# Patient Record
Sex: FEMALE | Race: WHITE | NOT HISPANIC OR LATINO | Employment: OTHER | URBAN - METROPOLITAN AREA
[De-identification: names, ages, dates, MRNs, and addresses within clinical notes are randomized per-mention and may not be internally consistent; named-entity substitution may affect disease eponyms.]

---

## 2017-01-23 ENCOUNTER — TRANSFERRED RECORDS (OUTPATIENT)
Dept: CARDIOLOGY | Facility: CLINIC | Age: 82
End: 2017-01-23

## 2017-01-31 ENCOUNTER — TRANSFERRED RECORDS (OUTPATIENT)
Dept: CARDIOLOGY | Facility: CLINIC | Age: 82
End: 2017-01-31

## 2017-02-08 DIAGNOSIS — I10 ESSENTIAL HYPERTENSION WITH GOAL BLOOD PRESSURE LESS THAN 140/90: Primary | Chronic | ICD-10-CM

## 2017-02-08 NOTE — TELEPHONE ENCOUNTER
Reason for Call:  Medication or medication refill:    Do you use a [facility]?  Name of the pharmacy and phone number for the current request:  leo in [facility]    Name of the medication requested: substitute for amLODIPine-benazepril (LOTREL) 10-40 MG per capsule states pharmacy told her ins will not cover-states is high importance    Other request:     Can we leave a detailed message on this number? YES    Phone number patient can be reached at: Other phone number:  [phone]    Best Time: asap    Call taken on 2/8/2017 at 3:18 PM by SOFIA MARCUM

## 2017-02-10 RX ORDER — AMLODIPINE AND BENAZEPRIL HYDROCHLORIDE 10; 40 MG/1; MG/1
CAPSULE ORAL
Qty: 90 CAPSULE | Refills: 2 | Status: SHIPPED | OUTPATIENT
Start: 2017-02-10 | End: 2017-05-16

## 2017-02-10 NOTE — TELEPHONE ENCOUNTER
Amlodipine-benazepril (lotrel) 10-40 mg  Last Written Prescription Date: 11/2/16  Last Fill Quantity: 90, # refills: 3  [facility] with [facility] or [facility] prescribing provider: 9/28/16       POTASSIUM   Date Value Ref Range Status   09/28/2016 4.0 3.4 - 5.3 mmol/L Final     CREATININE   Date Value Ref Range Status   09/28/2016 0.61 0.52 - 1.04 mg/dL Final     BP Readings from Last 3 Encounters:   09/28/16 112/64   10/20/15 110/56   09/30/15 130/58   Prescription approved per [facility] Refill Protocol.

## 2017-02-14 ENCOUNTER — TELEPHONE (OUTPATIENT)
Dept: FAMILY MEDICINE | Facility: CLINIC | Age: 82
End: 2017-02-14

## 2017-02-14 NOTE — TELEPHONE ENCOUNTER
If they want these two ingredients ordered separately, that will be fine (amlodipine and benazepril can be ordered as separate medications)      Please get more information regarding this issue.

## 2017-02-14 NOTE — TELEPHONE ENCOUNTER
pateint called stating [facility] is not going to cover amLODIPine-benazepril (LOTREL) 10-40 MG per capsule the without a PA.    [phone]  PT ID# [id number]    YANELI Vale

## 2017-02-14 NOTE — TELEPHONE ENCOUNTER
Prior Authorization Request    1. Prior Authorization for the medication amLODIPine-benazepril (LOTREL) 10-40 MG         Requesting Provider: Rodger Razo          Pt name: Lola Coto        Pt : 1934        Pt MRN: [id number]        Last Office Visit: 2016           Insurance: Payor: MEDICARE / Plan: MEDICARE / Product Type: Medicare /         Insurance ID Number: [unfilled] ID# [id number]        Prior Auth Contact Phone number: [phone]      To be completed by provider:           If requesting prior auth initiation:

## 2017-04-10 ENCOUNTER — TELEPHONE (OUTPATIENT)
Dept: FAMILY MEDICINE | Facility: CLINIC | Age: 82
End: 2017-04-10

## 2017-04-10 NOTE — TELEPHONE ENCOUNTER
Prior Authorization Request    Prior Authorization for the medication Estradiol 0.025MG/24HR weekly patches  1.         Requesting Provider: Rodger Razo          Pt name: Lola Coto        Pt : 1934        Pt MRN: [id number]        Last Office Visit: 2017           Insurance: Payor: MEDICARE / Plan: MEDICARE / Product Type: Medicare /         Insurance ID Number: [unfilled]        Prior Auth Contact Phone number:     BIN#:   PCN#:     PA started on CMM.       To be completed by provider:     2.   Refuse or Start Prior Auth:  Do not start Prior Auth, please call pt and have an office visit scheduled to discuss.      If requesting prior auth initiation:     Diagnosis

## 2017-05-01 ENCOUNTER — TRANSFERRED RECORDS (OUTPATIENT)
Dept: HEALTH INFORMATION MANAGEMENT | Facility: CLINIC | Age: 82
End: 2017-05-01

## 2017-05-12 ENCOUNTER — OFFICE VISIT (OUTPATIENT)
Dept: FAMILY MEDICINE | Facility: CLINIC | Age: 82
End: 2017-05-12
Payer: MEDICARE

## 2017-05-12 VITALS
TEMPERATURE: 97.4 F | WEIGHT: 118 LBS | HEIGHT: 64 IN | HEART RATE: 57 BPM | RESPIRATION RATE: 14 BRPM | SYSTOLIC BLOOD PRESSURE: 110 MMHG | DIASTOLIC BLOOD PRESSURE: 80 MMHG | BODY MASS INDEX: 20.14 KG/M2 | OXYGEN SATURATION: 97 %

## 2017-05-12 DIAGNOSIS — E78.2 MIXED HYPERLIPIDEMIA: ICD-10-CM

## 2017-05-12 DIAGNOSIS — Z13.89 SCREENING FOR DIABETIC PERIPHERAL NEUROPATHY: ICD-10-CM

## 2017-05-12 DIAGNOSIS — E11.9 TYPE 2 DIABETES MELLITUS WITHOUT COMPLICATION, WITHOUT LONG-TERM CURRENT USE OF INSULIN (H): ICD-10-CM

## 2017-05-12 DIAGNOSIS — I48.0 PAROXYSMAL ATRIAL FIBRILLATION (H): ICD-10-CM

## 2017-05-12 DIAGNOSIS — N30.00 ACUTE CYSTITIS WITHOUT HEMATURIA: Primary | ICD-10-CM

## 2017-05-12 DIAGNOSIS — I10 BENIGN ESSENTIAL HYPERTENSION: ICD-10-CM

## 2017-05-12 DIAGNOSIS — L23.9 ALLERGIC DERMATITIS: ICD-10-CM

## 2017-05-12 DIAGNOSIS — R82.90 NONSPECIFIC FINDING ON EXAMINATION OF URINE: ICD-10-CM

## 2017-05-12 LAB
ALBUMIN UR-MCNC: NEGATIVE MG/DL
ALT SERPL W P-5'-P-CCNC: 25 U/L (ref 0–50)
APPEARANCE UR: ABNORMAL
BACTERIA #/AREA URNS HPF: ABNORMAL /HPF
BILIRUB UR QL STRIP: NEGATIVE
CHOLEST SERPL-MCNC: 162 MG/DL
COLOR UR AUTO: YELLOW
GLUCOSE UR STRIP-MCNC: NEGATIVE MG/DL
HBA1C MFR BLD: 6 % (ref 4.3–6)
HDLC SERPL-MCNC: 87 MG/DL
HGB UR QL STRIP: NEGATIVE
KETONES UR STRIP-MCNC: NEGATIVE MG/DL
LDLC SERPL CALC-MCNC: 58 MG/DL
LEUKOCYTE ESTERASE UR QL STRIP: ABNORMAL
NITRATE UR QL: POSITIVE
NONHDLC SERPL-MCNC: 75 MG/DL
PH UR STRIP: 5.5 PH (ref 5–7)
RBC #/AREA URNS AUTO: ABNORMAL /HPF (ref 0–2)
SP GR UR STRIP: 1.02 (ref 1–1.03)
TRIGL SERPL-MCNC: 85 MG/DL
URN SPEC COLLECT METH UR: ABNORMAL
UROBILINOGEN UR STRIP-ACNC: 0.2 EU/DL (ref 0.2–1)
WBC #/AREA URNS AUTO: ABNORMAL /HPF (ref 0–2)

## 2017-05-12 PROCEDURE — 80061 LIPID PANEL: CPT | Performed by: FAMILY MEDICINE

## 2017-05-12 PROCEDURE — 87088 URINE BACTERIA CULTURE: CPT | Performed by: FAMILY MEDICINE

## 2017-05-12 PROCEDURE — 87086 URINE CULTURE/COLONY COUNT: CPT | Performed by: FAMILY MEDICINE

## 2017-05-12 PROCEDURE — 36415 COLL VENOUS BLD VENIPUNCTURE: CPT | Performed by: FAMILY MEDICINE

## 2017-05-12 PROCEDURE — 81001 URINALYSIS AUTO W/SCOPE: CPT | Performed by: FAMILY MEDICINE

## 2017-05-12 PROCEDURE — 99214 OFFICE O/P EST MOD 30 MIN: CPT | Performed by: FAMILY MEDICINE

## 2017-05-12 PROCEDURE — 99207 C FOOT EXAM  NO CHARGE: CPT | Performed by: FAMILY MEDICINE

## 2017-05-12 PROCEDURE — 87186 SC STD MICRODIL/AGAR DIL: CPT | Performed by: FAMILY MEDICINE

## 2017-05-12 PROCEDURE — 84460 ALANINE AMINO (ALT) (SGPT): CPT | Performed by: FAMILY MEDICINE

## 2017-05-12 PROCEDURE — 83036 HEMOGLOBIN GLYCOSYLATED A1C: CPT | Performed by: FAMILY MEDICINE

## 2017-05-12 NOTE — NURSING NOTE
"Chief Complaint   Patient presents with     UTI     /80  Pulse 57  Temp 97.4  F (36.3  C) (Tympanic)  Resp 14  Ht 5' 4\" (1.626 m)  Wt 118 lb (53.5 kg)  LMP  (LMP Unknown)  SpO2 97%  Breastfeeding? No  BMI 20.25 kg/m2 Estimated body mass index is 20.25 kg/(m^2) as calculated from the following:    Height as of this encounter: 5' 4\" (1.626 m).    Weight as of this encounter: 118 lb (53.5 kg).  BP completed using cuff size: regular   Marissa Carson CMA    Health Maintenance Due   Topic Date Due     FOOT EXAM Q1 YEAR( NO INBASKET)  1935     ADVANCE DIRECTIVE PLANNING Q5 YRS (NO INBASKET)  09/29/1952     OP ANNUAL INR REFERRAL  04/19/2015     FALL RISK ASSESSMENT  10/07/2015     EYE EXAM Q1 YEAR( NO INBASKET)  10/19/2016     A1C Q6 MO( NO INBASKET)  03/28/2017     Health Maintenance reviewed at today's visit patient asked to schedule/complete:   Diabetes:  Patient agrees to schedule    "

## 2017-05-12 NOTE — LETTER
"    5/19/2017     Lola Coto  [address] [place] [address]    Dear Lola:    Here are the results of your latest lipid tests:    LDL Cholesterol Calculated   Date Value Ref Range Status   05/12/2017 58 <100 mg/dL Final     Comment:     Desirable:       <100 mg/dl   ]  HDL Cholesterol   Date Value Ref Range Status   05/12/2017 87 >49 mg/dL Final   ]  Triglycerides   Date Value Ref Range Status   05/12/2017 85 <150 mg/dL Final     Comment:     Non Fasting   ]  Cholesterol   Date Value Ref Range Status   05/12/2017 162 <200 mg/dL Final   ]    LDL is the \"bad\" cholesterol linked to heart disease and stroke.   HDL is the \"good\" choesterol and when it is high, it decreases the risk for above problems.    Follow a low-fat, low-cholesterol diet and get regular exercise.  Please feel free to call the clinic if you have any questions.    You should be feeling better from your bladder infection as keflex was the appropriate antibiotic   Sincerely,      Key Lowry   "

## 2017-05-12 NOTE — MR AVS SNAPSHOT
After Visit Summary   5/12/2017    Lola Coto    MRN: [id number]           Patient Information     Date Of Birth          9/29/1934        Visit Information        Provider Department      5/12/2017 10:00 AM Key Lowry MD [facility]        Today's Diagnoses     Acute cystitis without hematuria    -  1    Benign essential hypertension        Type 2 diabetes mellitus without complication, without long-term current use of insulin (H)        Mixed hyperlipidemia        Paroxysmal atrial fibrillation (H)        Screening for diabetic peripheral neuropathy          Care Instructions    1. You have  a bladder infection Please increase your water intake  Do not drink cranberry juice as this does nothing different from water except cause weight gain.   We  Will notify you re the results of today's urine culture  You may need to return to be sure the infection is gone.  Oftentimes sexual intercourse causes the bacteria from the rectum to go into the bladder and cause infection.  To avoid this, please get up and go to the bathroom after intercourse and drink a large glass of water.  This allows the urine to wash out the infective bacteria and thus speed healing of an infection, and prevent one from occurring.  You may buy pyridium over the counter to help with the burning  It is a purple pill that turns the urine orange and helps with the burning     2. Get pyridium otc for the burning  As needed    3. We will do a urine today and if no sign of infection ( as no sign of infection on 5-3-17 and 5-8-17 )  Then would redo the urine --> culture as required 5-7 days after the last nitro furantoin-- take no more after  today     4. No more creams of any sort on the bottom     5. Warm water soaks for 10  min  2 times a day , then press dry ( the red area )     6. You see Dr Razo in 5 days  He can perhaps do the urine then         Follow-ups after your visit       "  Follow-up notes from your care team     Return in about 6 months (around 11/12/2017) for Routine Visit.      Your next 10 appointments already scheduled     May 16, 2017 10:15 AM CDT   [facility] Visit [facility]azo [facility]    [address] [place] [address]   [phone]           Bring a current list of meds and any records pertaining to this visit.  For Physicals, please bring immunization records and any forms needing to be filled out.  Please arrive 10 minutes early to complete paperwork.              Who to contact     If you have questions or need follow up information about today's clinic visit or your schedule please contact [facility] directly at [phone].  Normal or non-critical lab and imaging results will be communicated to you by MyChart, letter or phone within 4 business days after the clinic has received the results. If you do not hear from us within 7 days, please contact the clinic through MyChart or phone. If you have a critical or abnormal lab result, we will notify you by phone as soon as possible.  Submit refill requests through [employer] or call your pharmacy and they will forward the refill request to us. Please allow 3 business days for your refill to be completed.          Additional Information About Your Visit        MyChart Information     [employer] lets you send messages to your doctor, view your test results, renew your prescriptions, schedule appointments and more. To sign up, go to [email or web address] . Click on \"Log in\" on the left side of the screen, which will take you to the Welcome page. Then click on \"Sign up Now\" on the right side of the page.     You will be asked to enter the access code listed below, as well as some personal information. Please follow the directions to create your username and password.     Your access code " "is: [email or web address]  Expires: 8/10/2017 10:45 AM     Your access code will  in 90 days. If you need help or a new code, please call your [facility] clinic or [phone].        Care EveryWhere ID     This is your Care EveryWhere ID. This could be used by other organizations to access your [facility] medical records  [id number]        Your Vitals Were     Pulse Temperature Respirations Height Last Period Pulse Oximetry    57 97.4  F (36.3  C) (Tympanic) 14 5' 4\" (1.626 m) (LMP Unknown) 97%    Breastfeeding? BMI (Body Mass Index)                No 20.25 kg/m2           Blood Pressure from Last 3 Encounters:   17 110/80   16 112/64   10/20/15 110/56    Weight from Last 3 Encounters:   17 118 lb (53.5 kg)   16 125 lb (56.7 kg)   10/20/15 125 lb (56.7 kg)              We Performed the Following     ALT     C FOOT EXAM  NO CHARGE     Hemoglobin A1c     Lipid panel reflex to direct LDL        Primary Care Provider Office Phone # Fax #    Rodger Razo [phone]       [facility] [address] [place] [facility] [address]        Thank you!     Thank you for choosing [facility]  for your care. Our goal is always to provide you with excellent care. Hearing back from our patients is one way we can continue to improve our services. Please take a few minutes to complete the written survey that you may receive in the mail after your visit with us. Thank you!             Your Updated Medication List - Protect others around you: Learn how to safely use, store and throw away your medicines at www.disposemymeds.org.          This list is accurate as of: 17 10:46 AM.  Always use your most recent med list.                   Brand Name Dispense Instructions for use    amLODIPine-benazepril 10-40 MG per capsule    LOTREL    90 capsule    TAKE 1 CAPSULE BY MOUTH EVERY DAY       atorvastatin 10 MG tablet    LIPITOR    90 tablet    TAKE 1 TABLET " BY MOUTH ONCE DAILY       Calcium-Vitamin D 600-200 MG-UNIT Tabs      Take  by mouth.       CENTRUM SILVER PO      Take  by mouth.       ciprofloxacin 500 MG tablet    CIPRO    14 tablet    Take 1 tablet (500 mg) by mouth 2 times daily       estradiol 0.025 MG/24HR Ptwk WK patch    FEMPATCH    12 patch    UNWRAP AND APPLY 1 PATCH TRANSDERMALLY EVERY WEEK       fenofibrate 145 MG tablet     90 tablet    TAKE 1 TABLET BY MOUTH EVERY DAY       flecainide 100 MG tablet    TAMBOCOR    180 tablet    Take 1 tablet (100 mg) by mouth 2 times daily       levothyroxine 50 MCG tablet    SYNTHROID/LEVOTHROID    90 tablet    TAKE 1 TABLET BY MOUTH ONCE DAILY       metFORMIN 500 MG tablet    GLUCOPHAGE    270 tablet    Take 1 tablet (500 mg) by mouth 3 times daily (with meals) Patient takes one in the morning and two in the evening       metroNIDAZOLE 250 MG tablet    FLAGYL    21 tablet    Take 1 tablet (250 mg) by mouth 3 times daily       nitrofurantoin (macrocrystal-monohydrate) 100 MG capsule    MACROBID    14 capsule    Take 1 capsule (100 mg) by mouth 2 times daily       warfarin 2 MG tablet    COUMADIN    180 tablet    Take 2 tablets (4 mg) by mouth daily

## 2017-05-12 NOTE — PROGRESS NOTES
SUBJECTIVE:                                                    Lola Coto is a 82 year old female who presents to clinic today for the following health issues:    URINARY TRACT SYMPTOMS      Duration: x 1 week    Description  dysuria, frequency, retention, incontinence   Initial Rx 5-3-17 in [place] with nitrofurantoin 100mgm bid without any help at all   5-8-17 ur[facility] in [place]  Rx pyridium and cipro--she did not take the abx   Did buy monistat cream and applied to perineum with q void so many times a day and developed red burning scaling area of entire perineum, extending to inner thighs and pubic area with some mild swelling     Intensity:  severe    Accompanying signs and symptoms:  Fever/chills: no   Flank pain YES  Nausea and vomiting: YES  Vaginal symptoms: none  Abdominal/Pelvic Pain: YES    History  History of frequent UTI's: YES  History of kidney stones: no   Sexually Active: no   Possibility of pregnancy: No    Precipitating or alleviating factors: None    Therapies tried and outcome: course of antibiotics - Nitrofuratoin   5-3 to 5-11-17  Outcome: NA     Medication Followup of ALLERGY TO MONISTAT CREAM       Taking Medication as prescribed: no applied 8 x ++ a day to perineum starting 5-8-17     Side Effects:  Developed a burning red rash of the entire area     Medication Helping Symptoms:  NO     Diabetes Follow-up      Patient is checking blood sugars: not at all    Diabetic concerns: skin yeast or reaction and UTI      Symptoms of hypoglycemia (low blood sugar): none     Paresthesias (numbness or burning in feet) or sores: No     Date of last diabetic eye exam: 5-17    Med: metformin 500mgm      Mixed Hyperlipidemia Follow-Up      Rate your low fat/cholesterol diet?: not monitoring fat    Taking statin?  Yes, no muscle aches from 10mgm atorvastatin    Other lipid medications/supplements?:  Fenofibrate, without side effects    9-16 lipid panel = wnl      Hypertension Follow-up      Outpatient  "blood pressures are not being checked.     Here < 140/80    Low Salt Diet: not monitoring salt    ANTICOAGULATED with COUMADIN  For  PAT    -NO PROBLEMS  With the coumadin   -follow[place] IN[place] and in [place] and in [place]        Problem list and histories reviewed & adjusted, as indicated.  Additional history: as documented    Labs reviewed in EPIC    Reviewed and updated as needed this visit by clinical staff  Tobacco  Allergies  Meds  Problems  Med Hx  Surg Hx  Fam Hx  Soc Hx        Reviewed and updated as needed this visit by Provider         ROS:  C: NEGATIVE for fever, chills, change in weight  I: NEGATIVE for worrisome rashes, moles or lesions  E: NEGATIVE for vision changes or irritation  E/M: NEGATIVE for ear, mouth and throat problems  R: NEGATIVE for significant cough or SOB  B: NEGATIVE for masses, tenderness or discharge  CV: NEGATIVE for chest pain, palpitations or peripheral edema  GI: NEGATIVE for nausea, abdominal pain, heartburn, or change in bowel habits   female: dysuria, frequency, retention and urgency; entire perineum and between thighs = red and scaley   M: NEGATIVE for significant arthralgias or myalgia  N: NEGATIVE for weakness, dizziness or paresthesias  E: NEGATIVE for temperature intolerance, skin/hair changes  H: NEGATIVE for bleeding problems  P: NEGATIVE for changes in mood or affect    OBJECTIVE:                                                    /80  Pulse 57  Temp 97.4  F (36.3  C) (Tympanic)  Resp 14  Ht 5' 4\" (1.626 m)  Wt 118 lb (53.5 kg)  LMP  (LMP Unknown)  SpO2 97%  Breastfeeding? No  BMI 20.25 kg/m2  Body mass index is 20.25 kg/(m^2).  GENERAL: healthy, alert and no distress  EYES: Eyes grossly normal to inspection, PERRL and conjunctivae and sclerae normal  RESP: lungs clear to auscultation - no rales, rhonchi or wheezes  CV: regular rate and rhythm, normal S1 S2, no S3 or S4, no murmur, click or rub, no peripheral edema and peripheral pulses strong  MS: no " gross musculoskeletal defects noted, no edema  SKIN: no suspicious lesions ; red to pink confluent , scaley entire perineum to pubis, and between thighs   .Diabetic Foot Exam: No sores, ulcers, erthematous pressure areas; good DP& PT pulses and normal capillary filling time ;normal sensation to monofilament  testing   NEURO: Normal strength and tone, mentation intact and speech normal  PSYCH: mentation appears normal, affect normal/bright    Diagnostic Test Results:  Results for orders placed or performed in visit on 05/12/17 (from the past 24 hour(s))   Hemoglobin A1c   Result Value Ref Range    Hemoglobin A1C 6.0 4.3 - 6.0 %   UA reflex to Microscopic and Culture   Result Value Ref Range    Color Urine Yellow     Appearance Urine Cloudy     Glucose Urine Negative NEG mg/dL    Bilirubin Urine Negative NEG    Ketones Urine Negative NEG mg/dL    Specific Gravity Urine 1.025 1.003 - 1.035    Blood Urine Negative NEG    pH Urine 5.5 5.0 - 7.0 pH    Protein Albumin Urine Negative NEG mg/dL    Urobilinogen Urine 0.2 0.2 - 1.0 EU/dL    Nitrite Urine Positive (A) NEG    Leukocyte Esterase Urine Trace (A) NEG    Source Midstream Urine    Urine Microscopic   Result Value Ref Range    WBC Urine 2-5 (A) 0 - 2 /HPF    RBC Urine O - 2 0 - 2 /HPF    Bacteria Urine Many (A) NEG /HPF        ASSESSMENT/PLAN:                                                              ICD-10-CM    1. Acute cystitis without hematuria N30.00 UA reflex to Microscopic and Culture     Urine Microscopic   2. 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 Type 2 diabetes mellitus without complication, without long-term current use of insulin (H) E11.9 Hemoglobin A1c     C FOOT EXAM  NO CHARGE   4. Benign essential hypertension I10    5. Mixed hyperlipidemia E78.2 ALT     Lipid panel reflex to direct LDL   6. Paroxysmal atrial fibrillation (H) I48.0    7. Screening for diabetic peripheral neuropathy Z13.89 C FOOT EXAM  NO CHARGE   8.  Nonspecific finding on examination of urine R82.90 Urine Culture Aerobic Bacterial       Patient Instructions   1. You have  a bladder infection Please increase your water intake  Do not drink cranberry juice as this does nothing different from water except cause weight gain.   We  Will notify you re the results of today's urine culture  You may need to return to be sure the infection is gone.  Oftentimes sexual intercourse causes the bacteria from the rectum to go into the bladder and cause infection.  To avoid this, please get up and go to the bathroom after intercourse and drink a large glass of water.  This allows the urine to wash out the infective bacteria and thus speed healing of an infection, and prevent one from occurring.  You may buy pyridium over the counter to help with the burning  It is a purple pill that turns the urine orange and helps with the burning     2. Get pyridium otc for the burning  As needed    3. We will do a urine today and if no sign of infection ( as no sign of infection on 5-3-17 and 5-8-17 )  Then would redo the urine --> culture as required 5-7 days after the last nitro furantoin-- take no more after  today     4. No more creams of any sort on the bottom     5. Warm water soaks for 10  min  2 times a day , then press dry ( the red area )     6. You see Dr Razo in 5 days  He can perhaps do the urine then     Hopefully the contact dermatitis will die down   Off the abx, we can see if pt really has aUTI and can Rx if does     Key Lowry MD  [facility]

## 2017-05-12 NOTE — PATIENT INSTRUCTIONS
1. You have  a bladder infection Please increase your water intake  Do not drink cranberry juice as this does nothing different from water except cause weight gain.   We  Will notify you re the results of today's urine culture  You may need to return to be sure the infection is gone.  Oftentimes sexual intercourse causes the bacteria from the rectum to go into the bladder and cause infection.  To avoid this, please get up and go to the bathroom after intercourse and drink a large glass of water.  This allows the urine to wash out the infective bacteria and thus speed healing of an infection, and prevent one from occurring.  You may buy pyridium over the counter to help with the burning  It is a purple pill that turns the urine orange and helps with the burning     2. Get pyridium otc for the burning  As needed    3. We will do a urine today and if no sign of infection ( as no sign of infection on 5-3-17 and 5-8-17 )  Then would redo the urine --> culture as required 5-7 days after the last nitro furantoin-- take no more after  today     4. No more creams of any sort on the bottom     5. Warm water soaks for 10  min  2 times a day , then press dry ( the red area )     6. You see Dr Razo in 5 days  He can perhaps do the urine then

## 2017-05-14 LAB
BACTERIA SPEC CULT: ABNORMAL
MICRO REPORT STATUS: ABNORMAL
MICROORGANISM SPEC CULT: ABNORMAL
SPECIMEN SOURCE: ABNORMAL

## 2017-05-15 ENCOUNTER — TRANSFERRED RECORDS (OUTPATIENT)
Dept: HEALTH INFORMATION MANAGEMENT | Facility: CLINIC | Age: 82
End: 2017-05-15

## 2017-05-16 ENCOUNTER — OFFICE VISIT (OUTPATIENT)
Dept: FAMILY MEDICINE | Facility: CLINIC | Age: 82
End: 2017-05-16
Payer: MEDICARE

## 2017-05-16 ENCOUNTER — ANTICOAGULATION THERAPY VISIT (OUTPATIENT)
Dept: NURSING | Facility: CLINIC | Age: 82
End: 2017-05-16
Payer: MEDICARE

## 2017-05-16 VITALS
DIASTOLIC BLOOD PRESSURE: 60 MMHG | HEIGHT: 64 IN | RESPIRATION RATE: 20 BRPM | HEART RATE: 65 BPM | TEMPERATURE: 97.5 F | SYSTOLIC BLOOD PRESSURE: 118 MMHG | WEIGHT: 122 LBS | OXYGEN SATURATION: 98 % | BODY MASS INDEX: 20.83 KG/M2

## 2017-05-16 DIAGNOSIS — I10 BENIGN ESSENTIAL HYPERTENSION: ICD-10-CM

## 2017-05-16 DIAGNOSIS — N30.00 ACUTE CYSTITIS WITHOUT HEMATURIA: Primary | ICD-10-CM

## 2017-05-16 DIAGNOSIS — I48.91 ATRIAL FIBRILLATION (H): ICD-10-CM

## 2017-05-16 DIAGNOSIS — E78.2 MIXED HYPERLIPIDEMIA: ICD-10-CM

## 2017-05-16 DIAGNOSIS — M25.561 CHRONIC PAIN OF RIGHT KNEE: ICD-10-CM

## 2017-05-16 DIAGNOSIS — G89.29 CHRONIC PAIN OF RIGHT KNEE: ICD-10-CM

## 2017-05-16 DIAGNOSIS — I48.0 PAROXYSMAL ATRIAL FIBRILLATION (H): Chronic | ICD-10-CM

## 2017-05-16 DIAGNOSIS — E11.9 TYPE 2 DIABETES MELLITUS WITHOUT COMPLICATION, WITHOUT LONG-TERM CURRENT USE OF INSULIN (H): ICD-10-CM

## 2017-05-16 DIAGNOSIS — Z79.01 LONG-TERM (CURRENT) USE OF ANTICOAGULANTS: ICD-10-CM

## 2017-05-16 LAB — INR POINT OF CARE: 2.9 (ref 0.86–1.14)

## 2017-05-16 PROCEDURE — 85610 PROTHROMBIN TIME: CPT | Mod: QW | Performed by: INTERNAL MEDICINE

## 2017-05-16 PROCEDURE — 36416 COLLJ CAPILLARY BLOOD SPEC: CPT | Performed by: INTERNAL MEDICINE

## 2017-05-16 PROCEDURE — 99214 OFFICE O/P EST MOD 30 MIN: CPT | Performed by: INTERNAL MEDICINE

## 2017-05-16 RX ORDER — BENAZEPRIL HYDROCHLORIDE 40 MG/1
40 TABLET ORAL DAILY
Qty: 90 TABLET | Refills: 3 | Status: SHIPPED | OUTPATIENT
Start: 2017-05-16 | End: 2018-05-15

## 2017-05-16 RX ORDER — CEPHALEXIN 500 MG/1
500 CAPSULE ORAL 3 TIMES DAILY
Qty: 15 CAPSULE | Refills: 0 | Status: SHIPPED | OUTPATIENT
Start: 2017-05-16 | End: 2017-05-21

## 2017-05-16 RX ORDER — AMLODIPINE BESYLATE 10 MG/1
10 TABLET ORAL DAILY
Qty: 90 TABLET | Refills: 3 | Status: SHIPPED | OUTPATIENT
Start: 2017-05-16 | End: 2018-05-15

## 2017-05-16 NOTE — MR AVS SNAPSHOT
After Visit Summary   5/16/2017    Lola Coto    MRN: [id number]           Patient Information     Date Of Birth          9/29/1934        Visit Information        Provider Department      5/16/2017 10:15 AM Rodger Razo MD [facility]        Today's Diagnoses     Acute cystitis without hematuria    -  1    Chronic pain of right knee        Benign essential hypertension        Type 2 diabetes mellitus without complication, without long-term current use of insulin (H)        Paroxysmal atrial fibrillation (H)        Mixed hyperlipidemia          Care Instructions    Start taking the cephalexin to treat your bladder infection.                      Have your INR checked early next week.                                Today we switched the lotrel to it's components of amlodipine and benazepril.                 We will do some more labs in the fall.                               You are planning to see orthopedics regarding your knee and your foot.     Results for orders placed or performed in visit on 05/12/17   ALT   Result Value Ref Range    ALT 25 0 - 50 U/L   Hemoglobin A1c   Result Value Ref Range    Hemoglobin A1C 6.0 4.3 - 6.0 %   Lipid panel reflex to direct LDL   Result Value Ref Range    Cholesterol 162 <200 mg/dL    Triglycerides 85 <150 mg/dL    HDL Cholesterol 87 >49 mg/dL    LDL Cholesterol Calculated 58 <100 mg/dL    Non HDL Cholesterol 75 <130 mg/dL   UA reflex to Microscopic and Culture   Result Value Ref Range    Color Urine Yellow     Appearance Urine Cloudy     Glucose Urine Negative NEG mg/dL    Bilirubin Urine Negative NEG    Ketones Urine Negative NEG mg/dL    Specific Gravity Urine 1.025 1.003 - 1.035    Blood Urine Negative NEG    pH Urine 5.5 5.0 - 7.0 pH    Protein Albumin Urine Negative NEG mg/dL    Urobilinogen Urine 0.2 0.2 - 1.0 EU/dL    Nitrite Urine Positive (A) NEG    Leukocyte Esterase Urine Trace (A) NEG    Source Midstream Urine     Urine Microscopic   Result Value Ref Range    WBC Urine 2-5 (A) 0 - 2 /HPF    RBC Urine O - 2 0 - 2 /HPF    Bacteria Urine Many (A) NEG /HPF   Urine Culture Aerobic Bacterial   Result Value Ref Range    Specimen Description Midstream Urine     Culture Micro >100,000 colonies/mL Klebsiella pneumoniae (A)     Micro Report Status FINAL 05/14/2017     Organism: >100,000 colonies/mL Klebsiella pneumoniae        Susceptibility    >100,000 colonies/ml klebsiella pneumoniae (april) -  (no method available)     AMPICILLIN >32.0 Resistant  ug/mL     CEFAZOLIN Value in next row  ug/mL      <=4 SusceptibleCefazolin APRIL breakpoints are for the treatment of uncomplicated urinary tract infections.  For the treatment of systemic infections, please contact the laboratory for additional testing.     CEFOXITIN Value in next row  ug/mL      <=4 SusceptibleCefazolin APRIL breakpoints are for the treatment of uncomplicated urinary tract infections.  For the treatment of systemic infections, please contact the laboratory for additional testing.     CEFTAZIDIME Value in next row  ug/mL      <=4 SusceptibleCefazolin APRIL breakpoints are for the treatment of uncomplicated urinary tract infections.  For the treatment of systemic infections, please contact the laboratory for additional testing.     CEFTRIAXONE Value in next row  ug/mL      <=4 SusceptibleCefazolin APRIL breakpoints are for the treatment of uncomplicated urinary tract infections.  For the treatment of systemic infections, please contact the laboratory for additional testing.     CIPROFLOXACIN Value in next row  ug/mL      <=4 SusceptibleCefazolin APRIL breakpoints are for the treatment of uncomplicated urinary tract infections.  For the treatment of systemic infections, please contact the laboratory for additional testing.     GENTAMICIN Value in next row  ug/mL      <=4 SusceptibleCefazolin APRIL breakpoints are for the treatment of uncomplicated urinary tract infections.  For the  treatment of systemic infections, please contact the laboratory for additional testing.     LEVOFLOXACIN Value in next row  ug/mL      <=4 SusceptibleCefazolin LEEANNA breakpoints are for the treatment of uncomplicated urinary tract infections.  For the treatment of systemic infections, please contact the laboratory for additional testing.     NITROFURANTOIN Value in next row  ug/mL      <=4 SusceptibleCefazolin LEEANNA breakpoints are for the treatment of uncomplicated urinary tract infections.  For the treatment of systemic infections, please contact the laboratory for additional testing.     TOBRAMYCIN Value in next row  ug/mL      <=4 SusceptibleCefazolin LEEANNA breakpoints are for the treatment of uncomplicated urinary tract infections.  For the treatment of systemic infections, please contact the laboratory for additional testing.     Trimethoprim/Sulfa Value in next row  ug/mL      <=4 SusceptibleCefazolin LEEANNA breakpoints are for the treatment of uncomplicated urinary tract infections.  For the treatment of systemic infections, please contact the laboratory for additional testing.     AMPICILLIN/SULBACTAM Value in next row  ug/mL      <=4 SusceptibleCefazolin LEEANNA breakpoints are for the treatment of uncomplicated urinary tract infections.  For the treatment of systemic infections, please contact the laboratory for additional testing.     Piperacillin/Tazo Value in next row  ug/mL      <=4 SusceptibleCefazolin LEEANNA breakpoints are for the treatment of uncomplicated urinary tract infections.  For the treatment of systemic infections, please contact the laboratory for additional testing.     CEFEPIME Value in next row  ug/mL      <=4 SusceptibleCefazolin LEEANNA breakpoints are for the treatment of uncomplicated urinary tract infections.  For the treatment of systemic infections, please contact the laboratory for additional testing.             Follow-ups after your visit        Additional Services     ORTHOPEDICS ADULT  "REFERRAL       Your provider has referred you to: [facility] [place] [phone]   [email or web address]         Please have this patient see one of your knee specialists regarding right knee pain and right foot pain.   Please be aware that coverage of these services is subject to the terms and limitations of your health insurance plan.  Call member services at your health plan with any benefit or coverage questions.      Please bring the following to your appointment:    >>   Any x-rays, CTs or MRIs which have been performed.  Contact the facility where they were done to arrange for  prior to your scheduled appointment.    >>   List of current medications   >>   This referral request   >>   Any documents/labs given to you for this referral                  Who to contact     If you have questions or need follow up information about today's clinic visit or your schedule please contact [facility] directly at [phone].  Normal or non-critical lab and imaging results will be communicated to you by MyChart, letter or phone within 4 business days after the clinic has received the results. If you do not hear from us within 7 days, please contact the clinic through MyChart or phone. If you have a critical or abnormal lab result, we will notify you by phone as soon as possible.  Submit refill requests through [facility] or call your pharmacy and they will forward the refill request to us. Please allow 3 business days for your refill to be completed.          Additional Information About Your Visit        Care EveryWhere ID     This is your Care EveryWhere ID. This could be used by other organizations to access your [facility] medical records  [id number]        Your Vitals Were     Pulse Temperature Respirations Height Last Period Pulse Oximetry    65 97.5  F (36.4  C) 20 5' 4\" (1.626 m) (LMP Unknown) 98%    Breastfeeding? BMI (Body Mass Index)             "    No 20.94 kg/m2           Blood Pressure from Last 3 Encounters:   05/16/17 118/60   05/12/17 110/80   09/28/16 112/64    Weight from Last 3 Encounters:   05/16/17 122 lb (55.3 kg)   05/12/17 118 lb (53.5 kg)   09/28/16 125 lb (56.7 kg)              We Performed the Following     ORTHOPEDICS ADULT REFERRAL          Today's Medication Changes          These changes are accurate as of: 5/16/17 11:01 AM.  If you have any questions, ask your nurse or doctor.               Start taking these medicines.        Dose/Directions    amLODIPine 10 MG tablet   Commonly known as:  NORVASC   Used for:  Benign essential hypertension   Started by:  Rodger Razo MD        Dose:  10 mg   Take 1 tablet (10 mg) by mouth daily   Quantity:  90 tablet   Refills:  3       benazepril 40 MG tablet   Commonly known as:  LOTENSIN   Used for:  Benign essential hypertension   Started by:  Rodger Razo MD        Dose:  40 mg   Take 1 tablet (40 mg) by mouth daily   Quantity:  90 tablet   Refills:  3       cephALEXin 500 MG capsule   Commonly known as:  KEFLEX   Used for:  Acute cystitis without hematuria   Started by:  Rodger Razo MD        Dose:  500 mg   Take 1 capsule (500 mg) by mouth 3 times daily for 5 days   Quantity:  15 capsule   Refills:  0         Stop taking these medicines if you haven't already. Please contact your care team if you have questions.     amLODIPine-benazepril 10-40 MG per capsule   Commonly known as:  LOTREL   Stopped by:  Rodger Razo MD                Where to get your medicines      These medications were sent to [id number] - [place] - [address] AT [facility] [place] [address]     Phone:  [phone]     amLODIPine 10 MG tablet    benazepril 40 MG tablet    cephALEXin 500 MG capsule                Primary Care Provider Office Phone # Fax #    Rodger Razo [phone]       [facility] [address]  [facility] [address]        Thank you!     Thank you for choosing [facility]  for your care. Our goal is always to provide you with excellent care. Hearing back from our patients is one way we can continue to improve our services. Please take a few minutes to complete the written survey that you may receive in the mail after your visit with us. Thank you!             Your Updated Medication List - Protect others around you: Learn how to safely use, store and throw away your medicines at www.disposemymeds.org.          This list is accurate as of: 5/16/17 11:01 AM.  Always use your most recent med list.                   Brand Name Dispense Instructions for use    amLODIPine 10 MG tablet    NORVASC    90 tablet    Take 1 tablet (10 mg) by mouth daily       atorvastatin 10 MG tablet    LIPITOR    90 tablet    TAKE 1 TABLET BY MOUTH ONCE DAILY       benazepril 40 MG tablet    LOTENSIN    90 tablet    Take 1 tablet (40 mg) by mouth daily       Calcium-Vitamin D 600-200 MG-UNIT Tabs      Take  by mouth.       CENTRUM SILVER PO      Take  by mouth.       cephALEXin 500 MG capsule    KEFLEX    15 capsule    Take 1 capsule (500 mg) by mouth 3 times daily for 5 days       estradiol 0.025 MG/24HR Ptwk WK patch    FEMPATCH    12 patch    UNWRAP AND APPLY 1 PATCH TRANSDERMALLY EVERY WEEK       fenofibrate 145 MG tablet     90 tablet    TAKE 1 TABLET BY MOUTH EVERY DAY       flecainide 100 MG tablet    TAMBOCOR    180 tablet    Take 1 tablet (100 mg) by mouth 2 times daily       levothyroxine 50 MCG tablet    SYNTHROID/LEVOTHROID    90 tablet    TAKE 1 TABLET BY MOUTH ONCE DAILY       metFORMIN 500 MG tablet    GLUCOPHAGE    270 tablet    Take 1 tablet (500 mg) by mouth 3 times daily (with meals) Patient takes one in the morning and two in the evening       metroNIDAZOLE 250 MG tablet    FLAGYL    21 tablet    Take 1 tablet (250 mg) by mouth 3 times daily       warfarin 2 MG tablet     COUMADIN    180 tablet    Take 2 tablets (4 mg) by mouth daily

## 2017-05-16 NOTE — PROGRESS NOTES
ANTICOAGULATION FOLLOW-UP CLINIC VISIT    Patient Name:  Lola Coto  Date:  5/16/2017  Contact Type:  Face to Face    SUBJECTIVE:     Patient Findings     Positives No Problem Findings           OBJECTIVE    INR Protime   Date Value Ref Range Status   05/16/2017 2.9 (A) 0.86 - 1.14 Final       ASSESSMENT / PLAN  INR assessment THER    Recheck INR In: 1 WEEK    INR Location Clinic      Anticoagulation Summary as of 5/16/2017     INR goal 1.5-2.0   Today's INR 2.9!   Maintenance plan No maintenance plan   Full instructions 5/16: 4 mg; 5/17: 4 mg; 5/18: 2 mg; 5/19: 4 mg; 5/20: 4 mg; 5/21: 4 mg; 5/22: 4 mg; 5/23: 4 mg; 5/25: 2 mg; Otherwise No maintenance plan   Next INR check 5/24/2017   Target end date     Indications   Atrial fibrillation (H) [I48.91]  Long-term (current) use of anticoagulants [Z79.01] [Z79.01]         Anticoagulation Episode Summary     INR check location     Preferred lab     Send INR reminders to [facility] INR/PROTIME    Comments       Anticoagulation Care Providers     Provider Role Specialty Phone number    Rodger Razo MD [facility] Internal Medicine [phone]            See the Encounter Report to view Anticoagulation Flowsheet and Dosing Calendar (Go to Encounters tab in chart review, and find the Anticoagulation Therapy Visit)        Stephanie Snow RN

## 2017-05-16 NOTE — MR AVS SNAPSHOT
Lola Coto   5/16/2017   Anticoagulation Therapy Visit    Description:  82 year old female   Provider:  Rodger Razo MD   Department:  Bx Nurse           INR as of 5/16/2017     Today's INR 2.9!      Anticoagulation Summary as of 5/16/2017     INR goal 1.5-2.0   Today's INR 2.9!   Full instructions 5/16: 4 mg; 5/17: 4 mg; 5/18: 2 mg; 5/19: 4 mg; 5/20: 4 mg; 5/21: 4 mg; 5/22: 4 mg; 5/23: 4 mg; 5/25: 2 mg; Otherwise No maintenance plan   Next INR check 5/24/2017    Indications   Atrial fibrillation (H) [I48.91]  Long-term (current) use of anticoagulants [Z79.01] [Z79.01]         Description     Will get next INR at [facility] in [place]        Contact Numbers     [facility]  Please call  [phone] to cancel and/or reschedule your appointment   The direct line to the anticoagulant nurse is [phone] on Monday, Wednesday, and Friday. On Thursday, the anticoagulant nurse can be reached directly at [phone].         May 2017 Details    Sun Mon Tue Wed Thu Fri Sat      1               2               3               4               5               6                 7               8               9               10               11               12               13                 14               15               16      4 mg   See details      17      4 mg         18      2 mg         19      4 mg         20      4 mg           21      4 mg         22      4 mg         23      4 mg         24            25               26               27                 28               29               30               31                   Date Details   05/16 This INR check       Date of next INR:  5/24/2017         How to take your warfarin dose     To take:  2 mg Take 1 of the 2 mg tablets.    To take:  4 mg Take 2 of the 2 mg tablets.

## 2017-05-16 NOTE — PROGRESS NOTES
"  SUBJECTIVE:                                                    Lola Coto is a 82 year old female who presents to clinic today for the following health issues:      Hyperlipidemia Follow-Up      Rate your low fat/cholesterol diet?: good    Taking statin?  Yes, no muscle aches from statin    Other lipid medications/supplements?:  none     Hypertension Follow-up      Outpatient blood pressures are being checked at home.    Low Salt Diet: no added salt       Amount of exercise or physical activity: occ.    Problems taking medications regularly: No    Medication side effects: none    Diet: low salt and low fat/cholesterol    Medication Followup of other medical issues, also needs 5/12/17 UC results and INR today.    Taking Medication as prescribed: yes    Side Effects:  None    Medication Helping Symptoms:  Has concerns       Multiple issues.                     Recurrent UTI; Rx was macrobid,while out of town.              She reports no UC was done.          Was given cipro, but did not take it.   Sx of dysuria, etc continue, though are milder.                              Also had a perineal/vulvar rash; improving; ?contact dermatitis.                                                                                                     Last INR approx 3 wks ago; approx 2.8.          Going out of town tomorrow,for much of the summer.                                                                                                                                 Needs some med refills.       Ins co will not cover her ERT patch, or lotrel.                            Saw Cardiology in [facility]; stable.                                                                                                  Saw ortho 3 times in [place]; R knee pain; injection did not help.     Surgery not advised, though it is \"bone on bone\".                                                           Problem list and histories reviewed & adjusted, as " indicated.      Patient Active Problem List    Diagnosis Date Noted     Type 2 diabetes mellitus without complication (H) 05/20/2014     Priority: High     GIB (gastrointestinal bleeding) 05/09/2014     Priority: High     While in [facility] 10/13; BRBPR; hgb down to 9.6;INR 3.6; transfused;  colonoscopy neg other than diverticulosis    hgb up to 11.7 in 4/14         Paroxysmal atrial fibrillation (H)      Priority: High     Ablation procedure 2008; NSR since with flecainide;           Echo in [facility] 4/15; no major problems       Benign essential hypertension 05/12/2017     Priority: Medium     Mixed hyperlipidemia 05/12/2017     Priority: Medium     First degree atrioventricular block 09/28/2016     Priority: Medium     PVC's (premature ventricular contractions) 09/28/2016     Priority: Medium     IVCD (intraventricular conduction defect) 09/28/2016     Priority: Medium     Atrial fibrillation (H) 10/20/2015     Priority: Medium     Bilateral inguinal hernia 05/09/2014     Priority: Medium     L > R;       Surgery 5/14       Hormone replacement therapy (postmenopausal) 05/09/2014     Priority: Medium     Chronic, low dose ( pt choice to continue long term)       Osteopenia 05/09/2014     Priority: Medium     Alendronate [id number]; long term ERT       Thyroid nodule 05/09/2014     Priority: Medium     See endo notes 2010; see US 2014; 5 nodules, all < 1 cm       Diverticulitis of colon 09/18/2013     Priority: Medium     Hypothyroid      Priority: Medium     Preventive measure 09/13/2013     Priority: Low     APRIMA DATA BASE UNDER THE 9/13/13 NOTE  Colonoscopy 10/13; neg  Mammogram 9/14, 9/15         Past Surgical History:   Procedure Laterality Date     COLONOSCOPY  10/2013    diverticulosis     COSMETIC SURGERY  1990    face lift     DENTAL SURGERY  1954    wisdom     HERNIORRHAPHY INGUINAL BILATERAL  5/23/2014    Procedure: HERNIORRHAPHY INGUINAL BILATERAL;  Surgeon: Christ Spain MD;  [facility]: [facility]      HYSTERECTOMY  1980    endometriosis - abdominal took ovaries     ROTATOR CUFF REPAIR RT/LT  5/13    right; in [place]     Social History     Social History     Marital status:      Spouse name: N/A     Number of children: 2     Years of education: N/A     Occupational History      Retired     Social History Main Topics     Smoking status: Never Smoker     Smokeless tobacco: Never Used     Alcohol use Yes      Comment: wine night     Drug use: No     Sexual activity: No     Other Topics Concern     Parent/Sibling W/ Cabg, Mi Or Angioplasty Before 65f 55m? No     Caffeine Concern No     coffee: 1 1/2 a day     Sleep Concern No     Stress Concern No     Weight Concern No     Special Diet Yes     low fat, low carbs     Exercise Yes     walking x3 a week     Seat Belt Yes     Social History Narrative     Family History     Problem (# of Occurrences) Relation (Name,Age of Onset)    Arrhythmia (1) Sister    CEREBROVASCULAR DISEASE (1) Father    HEART DISEASE (1) Mother       Negative family history of: DIABETES, Coronary Artery Disease, Hypertension, Hyperlipidemia, Breast Cancer, Colon Cancer, Prostate Cancer, Other Cancer, Depression, Anxiety Disorder, MENTAL ILLNESS, Substance Abuse, Anesthesia Reaction, Asthma, OSTEOPOROSIS, Genetic Disorder, Thyroid Disease, Obesity, Unknown/Adopted          Current Outpatient Prescriptions   Medication Sig Dispense Refill     amLODIPine-benazepril (LOTREL) 10-40 MG per capsule TAKE 1 CAPSULE BY MOUTH EVERY DAY 90 capsule 2     levothyroxine (SYNTHROID, LEVOTHROID) 50 MCG tablet TAKE 1 TABLET BY MOUTH ONCE DAILY 90 tablet 3     fenofibrate 145 MG tablet TAKE 1 TABLET BY MOUTH EVERY DAY 90 tablet 3     atorvastatin (LIPITOR) 10 MG tablet TAKE 1 TABLET BY MOUTH ONCE DAILY 90 tablet 3     metFORMIN (GLUCOPHAGE) 500 MG tablet Take 1 tablet (500 mg) by mouth 3 times daily (with meals) Patient takes one in the morning and two in the evening 270 tablet 3     estradiol (FEMPATCH) 0.025  "MG/24HR PTWK UNWRAP AND APPLY 1 PATCH TRANSDERMALLY EVERY WEEK 12 patch 3     warfarin (COUMADIN) 2 MG tablet Take 2 tablets (4 mg) by mouth daily 180 tablet 3     flecainide (TAMBOCOR) 100 MG tablet Take 1 tablet (100 mg) by mouth 2 times daily 180 tablet 3     Calcium-Vitamin D 600-200 MG-UNIT TABS Take  by mouth.       Multiple Vitamins-Minerals (CENTRUM SILVER PO) Take  by mouth.       metroNIDAZOLE (FLAGYL) 250 MG tablet Take 1 tablet (250 mg) by mouth 3 times daily (Patient not taking: Reported on 5/16/2017) 21 tablet 1     Allergies   Allergen Reactions     Sulfa Drugs Anaphylaxis     Bactrim [Sulfamethoxazole W-Trimethoprim] Hives     Erythromycin GI Disturbance     Monistat [Miconazole] Dermatitis     Cream caused perineal reaction      Nsaids GI Disturbance     Quinolones Hives     Zolpidem      BP Readings from Last 3 Encounters:   05/16/17 118/60   05/12/17 110/80   09/28/16 112/64    Wt Readings from Last 3 Encounters:   05/16/17 122 lb (55.3 kg)   05/12/17 118 lb (53.5 kg)   09/28/16 125 lb (56.7 kg)                    Reviewed and updated as needed this visit by clinical staff  Tobacco  Allergies  Meds  Med Hx  Surg Hx  Fam Hx  Soc Hx      Reviewed and updated as needed this visit by Provider         ROS:  CONSTITUTIONAL:NEGATIVE for fever, chills, change in weight  CV: NEGATIVE for chest pain, palpitations or peripheral edema  : negative for hematuria    OBJECTIVE:                                                    /60 (BP Location: Right arm, Patient Position: Chair, Cuff Size: Adult Regular)  Pulse 65  Temp 97.5  F (36.4  C)  Resp 20  Ht 5' 4\" (1.626 m)  Wt 122 lb (55.3 kg)  LMP  (LMP Unknown)  SpO2 98%  Breastfeeding? No  BMI 20.94 kg/m2  Body mass index is 20.94 kg/(m^2).  GENERAL APPEARANCE: healthy, alert and no distress  CV: regular rates and rhythm and normal S1 S2, no S3 or S4  MS: arthritic changes of the R knee    Diagnostic test results:  none today.                 "      Recent Results (from the past 200 hour(s))   ALT    Collection Time: 05/12/17 10:47 AM   Result Value Ref Range    ALT 25 0 - 50 U/L   Hemoglobin A1c    Collection Time: 05/12/17 10:47 AM   Result Value Ref Range    Hemoglobin A1C 6.0 4.3 - 6.0 %   Lipid panel reflex to direct LDL    Collection Time: 05/12/17 10:47 AM   Result Value Ref Range    Cholesterol 162 <200 mg/dL    Triglycerides 85 <150 mg/dL    HDL Cholesterol 87 >49 mg/dL    LDL Cholesterol Calculated 58 <100 mg/dL    Non HDL Cholesterol 75 <130 mg/dL   UA reflex to Microscopic and Culture    Collection Time: 05/12/17 10:47 AM   Result Value Ref Range    Color Urine Yellow     Appearance Urine Cloudy     Glucose Urine Negative NEG mg/dL    Bilirubin Urine Negative NEG    Ketones Urine Negative NEG mg/dL    Specific Gravity Urine 1.025 1.003 - 1.035    Blood Urine Negative NEG    pH Urine 5.5 5.0 - 7.0 pH    Protein Albumin Urine Negative NEG mg/dL    Urobilinogen Urine 0.2 0.2 - 1.0 EU/dL    Nitrite Urine Positive (A) NEG    Leukocyte Esterase Urine Trace (A) NEG    Source Midstream Urine    Urine Microscopic    Collection Time: 05/12/17 10:47 AM   Result Value Ref Range    WBC Urine 2-5 (A) 0 - 2 /HPF    RBC Urine O - 2 0 - 2 /HPF    Bacteria Urine Many (A) NEG /HPF   Urine Culture Aerobic Bacterial    Collection Time: 05/12/17 11:59 AM   Result Value Ref Range    Specimen Description Midstream Urine     Culture Micro >100,000 colonies/mL Klebsiella pneumoniae (A)     Micro Report Status FINAL 05/14/2017     Organism: >100,000 colonies/mL Klebsiella pneumoniae        Susceptibility    >100,000 colonies/ml klebsiella pneumoniae (april) -  (no method available)     AMPICILLIN >32.0 Resistant  ug/mL     CEFAZOLIN Value in next row  ug/mL      <=4 SusceptibleCefazolin APRIL breakpoints are for the treatment of uncomplicated urinary tract infections.  For the treatment of systemic infections, please contact the laboratory for additional testing.      CEFOXITIN Value in next row  ug/mL      <=4 SusceptibleCefazolin LEEANNA breakpoints are for the treatment of uncomplicated urinary tract infections.  For the treatment of systemic infections, please contact the laboratory for additional testing.     CEFTAZIDIME Value in next row  ug/mL      <=4 SusceptibleCefazolin LEEANNA breakpoints are for the treatment of uncomplicated urinary tract infections.  For the treatment of systemic infections, please contact the laboratory for additional testing.     CEFTRIAXONE Value in next row  ug/mL      <=4 SusceptibleCefazolin LEEANNA breakpoints are for the treatment of uncomplicated urinary tract infections.  For the treatment of systemic infections, please contact the laboratory for additional testing.     CIPROFLOXACIN Value in next row  ug/mL      <=4 SusceptibleCefazolin LEEANNA breakpoints are for the treatment of uncomplicated urinary tract infections.  For the treatment of systemic infections, please contact the laboratory for additional testing.     GENTAMICIN Value in next row  ug/mL      <=4 SusceptibleCefazolin LEEANNA breakpoints are for the treatment of uncomplicated urinary tract infections.  For the treatment of systemic infections, please contact the laboratory for additional testing.     LEVOFLOXACIN Value in next row  ug/mL      <=4 SusceptibleCefazolin LEEANNA breakpoints are for the treatment of uncomplicated urinary tract infections.  For the treatment of systemic infections, please contact the laboratory for additional testing.     NITROFURANTOIN Value in next row  ug/mL      <=4 SusceptibleCefazolin LEEANNA breakpoints are for the treatment of uncomplicated urinary tract infections.  For the treatment of systemic infections, please contact the laboratory for additional testing.     TOBRAMYCIN Value in next row  ug/mL      <=4 SusceptibleCefazolin LEEANNA breakpoints are for the treatment of uncomplicated urinary tract infections.  For the treatment of systemic infections, please  contact the laboratory for additional testing.     Trimethoprim/Sulfa Value in next row  ug/mL      <=4 SusceptibleCefazolin LEEANNA breakpoints are for the treatment of uncomplicated urinary tract infections.  For the treatment of systemic infections, please contact the laboratory for additional testing.     AMPICILLIN/SULBACTAM Value in next row  ug/mL      <=4 SusceptibleCefazolin LEEANNA breakpoints are for the treatment of uncomplicated urinary tract infections.  For the treatment of systemic infections, please contact the laboratory for additional testing.     Piperacillin/Tazo Value in next row  ug/mL      <=4 SusceptibleCefazolin LEEANNA breakpoints are for the treatment of uncomplicated urinary tract infections.  For the treatment of systemic infections, please contact the laboratory for additional testing.     CEFEPIME Value in next row  ug/mL      <=4 SusceptibleCefazolin LEEANNA breakpoints are for the treatment of uncomplicated urinary tract infections.  For the treatment of systemic infections, please contact the laboratory for additional testing.          ASSESSMENT/PLAN:                                                        ICD-10-CM    1. Acute cystitis without hematuria N30.00 cephALEXin (KEFLEX) 500 MG capsule   2. Chronic pain of right knee M25.561 ORTHOPEDICS ADULT REFERRAL    G89.29    3. Benign essential hypertension I10 amLODIPine (NORVASC) 10 MG tablet     benazepril (LOTENSIN) 40 MG tablet   4. Type 2 diabetes mellitus without complication, without long-term current use of insulin (H) E11.9    5. Paroxysmal atrial fibrillation (H) I48.0    6. Mixed hyperlipidemia E78.2        Summary and implications:   multiple issues.   INR checked today, and also in a few days.  Rx cephalexin.     Recheck urine if sx continue.                                                               See ortho re: right knee.                   Follow up with Provider - fall 2017 or prn   Patient Instructions   Start taking the  cephalexin to treat your bladder infection.                      Have your INR checked early next week.                                Today we switched the lotrel to it's components of amlodipine and benazepril.                 We will do some more labs in the fall.       Rodger Razo MD  [facility]

## 2017-05-16 NOTE — PATIENT INSTRUCTIONS
Start taking the cephalexin to treat your bladder infection.                      Have your INR checked early next week.                                Today we switched the lotrel to it's components of amlodipine and benazepril.                 We will do some more labs in the fall.                               You are planning to see orthopedics regarding your knee and your foot.     Results for orders placed or performed in visit on 05/12/17   ALT   Result Value Ref Range    ALT 25 0 - 50 U/L   Hemoglobin A1c   Result Value Ref Range    Hemoglobin A1C 6.0 4.3 - 6.0 %   Lipid panel reflex to direct LDL   Result Value Ref Range    Cholesterol 162 <200 mg/dL    Triglycerides 85 <150 mg/dL    HDL Cholesterol 87 >49 mg/dL    LDL Cholesterol Calculated 58 <100 mg/dL    Non HDL Cholesterol 75 <130 mg/dL   UA reflex to Microscopic and Culture   Result Value Ref Range    Color Urine Yellow     Appearance Urine Cloudy     Glucose Urine Negative NEG mg/dL    Bilirubin Urine Negative NEG    Ketones Urine Negative NEG mg/dL    Specific Gravity Urine 1.025 1.003 - 1.035    Blood Urine Negative NEG    pH Urine 5.5 5.0 - 7.0 pH    Protein Albumin Urine Negative NEG mg/dL    Urobilinogen Urine 0.2 0.2 - 1.0 EU/dL    Nitrite Urine Positive (A) NEG    Leukocyte Esterase Urine Trace (A) NEG    Source Midstream Urine    Urine Microscopic   Result Value Ref Range    WBC Urine 2-5 (A) 0 - 2 /HPF    RBC Urine O - 2 0 - 2 /HPF    Bacteria Urine Many (A) NEG /HPF   Urine Culture Aerobic Bacterial   Result Value Ref Range    Specimen Description Midstream Urine     Culture Micro >100,000 colonies/mL Klebsiella pneumoniae (A)     Micro Report Status FINAL 05/14/2017     Organism: >100,000 colonies/mL Klebsiella pneumoniae        Susceptibility    >100,000 colonies/ml klebsiella pneumoniae (april) -  (no method available)     AMPICILLIN >32.0 Resistant  ug/mL     CEFAZOLIN Value in next row  ug/mL      <=4 SusceptibleCefazolin APRIL breakpoints  are for the treatment of uncomplicated urinary tract infections.  For the treatment of systemic infections, please contact the laboratory for additional testing.     CEFOXITIN Value in next row  ug/mL      <=4 SusceptibleCefazolin LEEANNA breakpoints are for the treatment of uncomplicated urinary tract infections.  For the treatment of systemic infections, please contact the laboratory for additional testing.     CEFTAZIDIME Value in next row  ug/mL      <=4 SusceptibleCefazolin LEEANNA breakpoints are for the treatment of uncomplicated urinary tract infections.  For the treatment of systemic infections, please contact the laboratory for additional testing.     CEFTRIAXONE Value in next row  ug/mL      <=4 SusceptibleCefazolin LEEANNA breakpoints are for the treatment of uncomplicated urinary tract infections.  For the treatment of systemic infections, please contact the laboratory for additional testing.     CIPROFLOXACIN Value in next row  ug/mL      <=4 SusceptibleCefazolin LEEANNA breakpoints are for the treatment of uncomplicated urinary tract infections.  For the treatment of systemic infections, please contact the laboratory for additional testing.     GENTAMICIN Value in next row  ug/mL      <=4 SusceptibleCefazolin LEEANNA breakpoints are for the treatment of uncomplicated urinary tract infections.  For the treatment of systemic infections, please contact the laboratory for additional testing.     LEVOFLOXACIN Value in next row  ug/mL      <=4 SusceptibleCefazolin LEEANNA breakpoints are for the treatment of uncomplicated urinary tract infections.  For the treatment of systemic infections, please contact the laboratory for additional testing.     NITROFURANTOIN Value in next row  ug/mL      <=4 SusceptibleCefazolin LEEANNA breakpoints are for the treatment of uncomplicated urinary tract infections.  For the treatment of systemic infections, please contact the laboratory for additional testing.     TOBRAMYCIN Value in next row  ug/mL       <=4 SusceptibleCefazolin LEEANNA breakpoints are for the treatment of uncomplicated urinary tract infections.  For the treatment of systemic infections, please contact the laboratory for additional testing.     Trimethoprim/Sulfa Value in next row  ug/mL      <=4 SusceptibleCefazolin LEEANNA breakpoints are for the treatment of uncomplicated urinary tract infections.  For the treatment of systemic infections, please contact the laboratory for additional testing.     AMPICILLIN/SULBACTAM Value in next row  ug/mL      <=4 SusceptibleCefazolin LEEANNA breakpoints are for the treatment of uncomplicated urinary tract infections.  For the treatment of systemic infections, please contact the laboratory for additional testing.     Piperacillin/Tazo Value in next row  ug/mL      <=4 SusceptibleCefazolin LEEANNA breakpoints are for the treatment of uncomplicated urinary tract infections.  For the treatment of systemic infections, please contact the laboratory for additional testing.     CEFEPIME Value in next row  ug/mL      <=4 SusceptibleCefazolin LEEANNA breakpoints are for the treatment of uncomplicated urinary tract infections.  For the treatment of systemic infections, please contact the laboratory for additional testing.

## 2017-08-08 ENCOUNTER — TELEPHONE (OUTPATIENT)
Dept: FAMILY MEDICINE | Facility: CLINIC | Age: 82
End: 2017-08-08

## 2017-08-08 NOTE — TELEPHONE ENCOUNTER
Lucie called stating pt brought a script for Macrobid from 9/28/16.  This Rx has been d/c on 5/16/17. Pharmacist will not accept this Rx.   Pharmacist instructed to have patient call or make an OV at the clinic clinic to discuss current symptoms.

## 2017-08-09 ENCOUNTER — TELEPHONE (OUTPATIENT)
Dept: FAMILY MEDICINE | Facility: CLINIC | Age: 82
End: 2017-08-09

## 2017-08-09 NOTE — TELEPHONE ENCOUNTER
Prior Authorization Request    Prior Authorization for the medication Estradiol 0.025MG/24HR weekly patches  1.         Requesting Provider: Rodger Razo          Pt name: Lola Coto        Pt : 1934        Pt MRN: [id number]        Last Office Visit: 2017           Insurance: Payor: MEDICARE / Plan: MEDICARE / Product Type: Medicare /         Insurance ID Number: [unfilled]        Prior Auth Contact Phone number:     BIN#:   PCN#:     PA started on CMM.       To be completed by provider:     2.   Refuse or Start Prior Auth:  Please start Prior Auth.      If requesting prior auth initiation:       Diagnosis (with code): Z79.890 (post menopausal hormone replacement)      Patient has been on this medication since: many years      Prior Medications, dates used, reason for failure:     She has used oral estrogen       Reasons why other medications are not adequate substitutions:           She also takes warfarin; there is a higher thrombotic risk with oral vs transdermal estrogen

## 2017-08-09 NOTE — TELEPHONE ENCOUNTER
In the recent past, we were told by her ins co that they will not cover these.                Has anything changed? Have they reconsidered?

## 2017-08-10 NOTE — TELEPHONE ENCOUNTER
I don't recall if we discussed it, but in the past she has stated she wants to continue ERT.               Please send in the PA request.

## 2017-08-10 NOTE — TELEPHONE ENCOUNTER
Well they still aren't covering it but the last PA request that was placed for estradiol you stated that you wanted to discuss this with her at her next OV. Please advise.

## 2017-08-28 ENCOUNTER — TRANSFERRED RECORDS (OUTPATIENT)
Dept: HEALTH INFORMATION MANAGEMENT | Facility: CLINIC | Age: 82
End: 2017-08-28

## 2017-08-30 ENCOUNTER — TRANSFERRED RECORDS (OUTPATIENT)
Dept: HEALTH INFORMATION MANAGEMENT | Facility: CLINIC | Age: 82
End: 2017-08-30

## 2017-09-01 ENCOUNTER — TRANSFERRED RECORDS (OUTPATIENT)
Dept: HEALTH INFORMATION MANAGEMENT | Facility: CLINIC | Age: 82
End: 2017-09-01

## 2017-09-02 ENCOUNTER — APPOINTMENT (OUTPATIENT)
Dept: NUCLEAR MEDICINE | Facility: CLINIC | Age: 82
DRG: 330 | End: 2017-09-02
Attending: INTERNAL MEDICINE
Payer: MEDICARE

## 2017-09-02 ENCOUNTER — HOSPITAL ENCOUNTER (INPATIENT)
Facility: CLINIC | Age: 82
LOS: 7 days | Discharge: HOME | DRG: 330 | End: 2017-09-09
Attending: INTERNAL MEDICINE | Admitting: INTERNAL MEDICINE
Payer: MEDICARE

## 2017-09-02 ENCOUNTER — TRANSFERRED RECORDS (OUTPATIENT)
Dept: HEALTH INFORMATION MANAGEMENT | Facility: CLINIC | Age: 82
End: 2017-09-02

## 2017-09-02 ENCOUNTER — APPOINTMENT (OUTPATIENT)
Dept: INTERVENTIONAL RADIOLOGY/VASCULAR | Facility: CLINIC | Age: 82
DRG: 330 | End: 2017-09-02
Attending: RADIOLOGY
Payer: MEDICARE

## 2017-09-02 ENCOUNTER — ANESTHESIA EVENT (OUTPATIENT)
Dept: SURGERY | Facility: CLINIC | Age: 82
DRG: 330 | End: 2017-09-02
Payer: MEDICARE

## 2017-09-02 DIAGNOSIS — D62 ANEMIA DUE TO BLOOD LOSS, ACUTE: Primary | ICD-10-CM

## 2017-09-02 DIAGNOSIS — G89.18 POSTOPERATIVE PAIN: ICD-10-CM

## 2017-09-02 PROBLEM — K92.2 LOWER GI BLEED: Status: ACTIVE | Noted: 2017-09-02

## 2017-09-02 LAB
ANION GAP SERPL CALCULATED.3IONS-SCNC: 12 MMOL/L (ref 3–14)
BUN SERPL-MCNC: 11 MG/DL (ref 7–30)
CALCIUM SERPL-MCNC: 7 MG/DL (ref 8.5–10.1)
CHLORIDE SERPL-SCNC: 112 MMOL/L (ref 94–109)
CO2 SERPL-SCNC: 17 MMOL/L (ref 20–32)
CREAT SERPL-MCNC: 0.45 MG/DL (ref 0.52–1.04)
ERYTHROCYTE [DISTWIDTH] IN BLOOD BY AUTOMATED COUNT: 15 % (ref 10–15)
GFR SERPL CREATININE-BSD FRML MDRD: >90 ML/MIN/1.7M2
GLUCOSE BLDC GLUCOMTR-MCNC: 76 MG/DL (ref 70–99)
GLUCOSE BLDC GLUCOMTR-MCNC: 82 MG/DL (ref 70–99)
GLUCOSE SERPL-MCNC: 86 MG/DL (ref 70–99)
HCT VFR BLD AUTO: 31.5 % (ref 35–47)
HGB BLD-MCNC: 10.1 G/DL (ref 11.7–15.7)
HGB BLD-MCNC: 11 G/DL (ref 11.7–15.7)
INR PPP: 1.01 (ref 0.86–1.14)
MCH RBC QN AUTO: 30.2 PG (ref 26.5–33)
MCHC RBC AUTO-ENTMCNC: 34.9 G/DL (ref 31.5–36.5)
MCV RBC AUTO: 87 FL (ref 78–100)
PLATELET # BLD AUTO: 142 10E9/L (ref 150–450)
POTASSIUM SERPL-SCNC: 4.2 MMOL/L (ref 3.4–5.3)
RADIOLOGIST FLAGS: ABNORMAL
RBC # BLD AUTO: 3.64 10E12/L (ref 3.8–5.2)
SODIUM SERPL-SCNC: 141 MMOL/L (ref 133–144)
TROPONIN I SERPL-MCNC: 0.05 UG/L (ref 0–0.04)
TROPONIN I SERPL-MCNC: 0.05 UG/L (ref 0–0.04)
WBC # BLD AUTO: 7.9 10E9/L (ref 4–11)

## 2017-09-02 PROCEDURE — 75774 ARTERY X-RAY EACH VESSEL: CPT

## 2017-09-02 PROCEDURE — 25000128 H RX IP 250 OP 636: Performed by: RADIOLOGY

## 2017-09-02 PROCEDURE — 25000132 ZZH RX MED GY IP 250 OP 250 PS 637: Mod: GY | Performed by: INTERNAL MEDICINE

## 2017-09-02 PROCEDURE — 80048 BASIC METABOLIC PNL TOTAL CA: CPT | Performed by: INTERNAL MEDICINE

## 2017-09-02 PROCEDURE — 85610 PROTHROMBIN TIME: CPT | Performed by: INTERNAL MEDICINE

## 2017-09-02 PROCEDURE — 27210746 ZZH CATH CR16

## 2017-09-02 PROCEDURE — 99207 ZZC DOWN CODE DUE TO INITIAL EXAM: CPT | Performed by: INTERNAL MEDICINE

## 2017-09-02 PROCEDURE — 20000003 ZZH R&B ICU

## 2017-09-02 PROCEDURE — 34300033 ZZH RX 343: Performed by: INTERNAL MEDICINE

## 2017-09-02 PROCEDURE — 25000125 ZZHC RX 250

## 2017-09-02 PROCEDURE — 85018 HEMOGLOBIN: CPT | Performed by: INTERNAL MEDICINE

## 2017-09-02 PROCEDURE — 75726 ARTERY X-RAYS ABDOMEN: CPT

## 2017-09-02 PROCEDURE — 36245 INS CATH ABD/L-EXT ART 1ST: CPT

## 2017-09-02 PROCEDURE — 00000146 ZZHCL STATISTIC GLUCOSE BY METER IP

## 2017-09-02 PROCEDURE — 27210742 ZZH CATH CR1

## 2017-09-02 PROCEDURE — 84484 ASSAY OF TROPONIN QUANT: CPT | Performed by: INTERNAL MEDICINE

## 2017-09-02 PROCEDURE — 27210804 ZZH SHEATH CR3

## 2017-09-02 PROCEDURE — C1760 CLOSURE DEV, VASC: HCPCS

## 2017-09-02 PROCEDURE — A9270 NON-COVERED ITEM OR SERVICE: HCPCS | Mod: GY | Performed by: INTERNAL MEDICINE

## 2017-09-02 PROCEDURE — B41B1ZZ FLUOROSCOPY OF OTHER INTRA-ABDOMINAL ARTERIES USING LOW OSMOLAR CONTRAST: ICD-10-PCS | Performed by: RADIOLOGY

## 2017-09-02 PROCEDURE — B4141ZZ FLUOROSCOPY OF SUPERIOR MESENTERIC ARTERY USING LOW OSMOLAR CONTRAST: ICD-10-PCS | Performed by: RADIOLOGY

## 2017-09-02 PROCEDURE — 78299 UNLISTED GI PX DX NUC MED: CPT

## 2017-09-02 PROCEDURE — 27210886 ZZH ACCESSORY CR5

## 2017-09-02 PROCEDURE — 36415 COLL VENOUS BLD VENIPUNCTURE: CPT | Performed by: INTERNAL MEDICINE

## 2017-09-02 PROCEDURE — 25000128 H RX IP 250 OP 636

## 2017-09-02 PROCEDURE — 36248 INS CATH ABD/L-EXT ART ADDL: CPT

## 2017-09-02 PROCEDURE — 93005 ELECTROCARDIOGRAM TRACING: CPT

## 2017-09-02 PROCEDURE — 27210906 ZZH KIT CR8

## 2017-09-02 PROCEDURE — 25000128 H RX IP 250 OP 636: Performed by: INTERNAL MEDICINE

## 2017-09-02 PROCEDURE — 93010 ELECTROCARDIOGRAM REPORT: CPT | Performed by: INTERNAL MEDICINE

## 2017-09-02 PROCEDURE — 85027 COMPLETE CBC AUTOMATED: CPT | Performed by: INTERNAL MEDICINE

## 2017-09-02 PROCEDURE — C1769 GUIDE WIRE: HCPCS

## 2017-09-02 PROCEDURE — B4151ZZ FLUOROSCOPY OF INFERIOR MESENTERIC ARTERY USING LOW OSMOLAR CONTRAST: ICD-10-PCS | Performed by: RADIOLOGY

## 2017-09-02 PROCEDURE — A9560 TC99M LABELED RBC: HCPCS | Performed by: INTERNAL MEDICINE

## 2017-09-02 PROCEDURE — 99221 1ST HOSP IP/OBS SF/LOW 40: CPT | Mod: AI | Performed by: INTERNAL MEDICINE

## 2017-09-02 RX ORDER — HEPARIN SODIUM 1000 [USP'U]/ML
INJECTION, SOLUTION INTRAVENOUS; SUBCUTANEOUS
Status: DISCONTINUED
Start: 2017-09-02 | End: 2017-09-02 | Stop reason: HOSPADM

## 2017-09-02 RX ORDER — FENTANYL CITRATE 50 UG/ML
INJECTION, SOLUTION INTRAMUSCULAR; INTRAVENOUS
Status: DISCONTINUED
Start: 2017-09-02 | End: 2017-09-02 | Stop reason: HOSPADM

## 2017-09-02 RX ORDER — LIDOCAINE HYDROCHLORIDE 10 MG/ML
INJECTION, SOLUTION INFILTRATION; PERINEURAL
Status: DISCONTINUED
Start: 2017-09-02 | End: 2017-09-02 | Stop reason: HOSPADM

## 2017-09-02 RX ORDER — ONDANSETRON 4 MG/1
4 TABLET, ORALLY DISINTEGRATING ORAL EVERY 6 HOURS PRN
Status: DISCONTINUED | OUTPATIENT
Start: 2017-09-02 | End: 2017-09-04

## 2017-09-02 RX ORDER — ONDANSETRON 4 MG/1
4 TABLET, ORALLY DISINTEGRATING ORAL EVERY 6 HOURS PRN
Status: DISCONTINUED | OUTPATIENT
Start: 2017-09-02 | End: 2017-09-02

## 2017-09-02 RX ORDER — IOPAMIDOL 612 MG/ML
150 INJECTION, SOLUTION INTRAVASCULAR ONCE
Status: COMPLETED | OUTPATIENT
Start: 2017-09-02 | End: 2017-09-02

## 2017-09-02 RX ORDER — LEVOTHYROXINE SODIUM 50 UG/1
50 TABLET ORAL DAILY
Status: DISCONTINUED | OUTPATIENT
Start: 2017-09-03 | End: 2017-09-06

## 2017-09-02 RX ORDER — FENTANYL CITRATE 50 UG/ML
25-50 INJECTION, SOLUTION INTRAMUSCULAR; INTRAVENOUS EVERY 5 MIN PRN
Status: DISCONTINUED | OUTPATIENT
Start: 2017-09-02 | End: 2017-09-02 | Stop reason: HOSPADM

## 2017-09-02 RX ORDER — LIDOCAINE HYDROCHLORIDE 10 MG/ML
1-30 INJECTION, SOLUTION EPIDURAL; INFILTRATION; INTRACAUDAL; PERINEURAL
Status: COMPLETED | OUTPATIENT
Start: 2017-09-02 | End: 2017-09-02

## 2017-09-02 RX ORDER — FLUMAZENIL 0.1 MG/ML
0.2 INJECTION, SOLUTION INTRAVENOUS
Status: DISCONTINUED | OUTPATIENT
Start: 2017-09-02 | End: 2017-09-02 | Stop reason: HOSPADM

## 2017-09-02 RX ORDER — METOPROLOL TARTRATE 1 MG/ML
2.5 INJECTION, SOLUTION INTRAVENOUS EVERY 4 HOURS PRN
Status: DISCONTINUED | OUTPATIENT
Start: 2017-09-02 | End: 2017-09-09 | Stop reason: HOSPADM

## 2017-09-02 RX ORDER — NALOXONE HYDROCHLORIDE 0.4 MG/ML
.1-.4 INJECTION, SOLUTION INTRAMUSCULAR; INTRAVENOUS; SUBCUTANEOUS
Status: DISCONTINUED | OUTPATIENT
Start: 2017-09-02 | End: 2017-09-02

## 2017-09-02 RX ORDER — NALOXONE HYDROCHLORIDE 0.4 MG/ML
.1-.4 INJECTION, SOLUTION INTRAMUSCULAR; INTRAVENOUS; SUBCUTANEOUS
Status: DISCONTINUED | OUTPATIENT
Start: 2017-09-02 | End: 2017-09-09 | Stop reason: HOSPADM

## 2017-09-02 RX ORDER — ACETAMINOPHEN 325 MG/1
650 TABLET ORAL EVERY 4 HOURS PRN
Status: DISCONTINUED | OUTPATIENT
Start: 2017-09-02 | End: 2017-09-09 | Stop reason: HOSPADM

## 2017-09-02 RX ORDER — ONDANSETRON 2 MG/ML
4 INJECTION INTRAMUSCULAR; INTRAVENOUS EVERY 6 HOURS PRN
Status: DISCONTINUED | OUTPATIENT
Start: 2017-09-02 | End: 2017-09-02

## 2017-09-02 RX ORDER — IODIXANOL 320 MG/ML
150 INJECTION, SOLUTION INTRAVASCULAR ONCE
Status: DISCONTINUED | OUTPATIENT
Start: 2017-09-02 | End: 2017-09-02 | Stop reason: CLARIF

## 2017-09-02 RX ORDER — ONDANSETRON 2 MG/ML
4 INJECTION INTRAMUSCULAR; INTRAVENOUS EVERY 6 HOURS PRN
Status: DISCONTINUED | OUTPATIENT
Start: 2017-09-02 | End: 2017-09-04

## 2017-09-02 RX ORDER — NALOXONE HYDROCHLORIDE 0.4 MG/ML
.1-.4 INJECTION, SOLUTION INTRAMUSCULAR; INTRAVENOUS; SUBCUTANEOUS
Status: DISCONTINUED | OUTPATIENT
Start: 2017-09-02 | End: 2017-09-02 | Stop reason: HOSPADM

## 2017-09-02 RX ORDER — CHLORHEXIDINE GLUCONATE 40 MG/ML
SOLUTION TOPICAL ONCE
Status: CANCELLED | OUTPATIENT
Start: 2017-09-02 | End: 2017-09-02

## 2017-09-02 RX ORDER — SODIUM CHLORIDE 9 MG/ML
INJECTION, SOLUTION INTRAVENOUS CONTINUOUS
Status: DISCONTINUED | OUTPATIENT
Start: 2017-09-02 | End: 2017-09-03

## 2017-09-02 RX ORDER — FLECAINIDE ACETATE 100 MG/1
100 TABLET ORAL 2 TIMES DAILY
Status: DISCONTINUED | OUTPATIENT
Start: 2017-09-02 | End: 2017-09-09 | Stop reason: HOSPADM

## 2017-09-02 RX ADMIN — SODIUM CHLORIDE: 9 INJECTION, SOLUTION INTRAVENOUS at 17:35

## 2017-09-02 RX ADMIN — FENTANYL CITRATE 25 MCG: 50 INJECTION, SOLUTION INTRAMUSCULAR; INTRAVENOUS at 20:00

## 2017-09-02 RX ADMIN — LIDOCAINE HYDROCHLORIDE 10 MG: 10 INJECTION, SOLUTION EPIDURAL; INFILTRATION; INTRACAUDAL; PERINEURAL at 20:18

## 2017-09-02 RX ADMIN — FLECAINIDE ACETATE 100 MG: 100 TABLET ORAL at 21:55

## 2017-09-02 RX ADMIN — Medication 26.6 MILLICURIE: at 15:45

## 2017-09-02 RX ADMIN — LIDOCAINE HYDROCHLORIDE 10 MG: 10 INJECTION, SOLUTION INFILTRATION; PERINEURAL at 20:18

## 2017-09-02 RX ADMIN — FENTANYL CITRATE 50 MCG: 50 INJECTION, SOLUTION INTRAMUSCULAR; INTRAVENOUS at 19:34

## 2017-09-02 RX ADMIN — MIDAZOLAM HYDROCHLORIDE 1 MG: 1 INJECTION, SOLUTION INTRAMUSCULAR; INTRAVENOUS at 19:35

## 2017-09-02 RX ADMIN — IOPAMIDOL 55 ML: 612 INJECTION, SOLUTION INTRAVENOUS at 20:09

## 2017-09-02 NOTE — H&P
PRIMARY CARE PHYSICIAN:  Rodger Razo MD.       PRIMARY CARDIOLOGIST:  CAMILA Bee MD.      CHIEF COMPLAINT:  Suspected lower GI bleed transferred from outside hospital.      HISTORY OF PRESENT ILLNESS:  Lola Coto is a very pleasant 82-year-old  female who is on chronic anticoagulation for atrial fibrillation.  She had failed an ablation treatment in the past, she is on flecainide to keep her in normal sinus rhythm.  She has had history of abdominal surgery in the past.  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  Most recently about 4 to 5 years ago, she underwent colonoscopy which was quite difficult and the  needed to use nearly 3 scopes and recommended against future endoscopies.  The patient also has undergone unsuccessful ablation for her atrial fibrillation.  She also had post-procedure complications of a leg clot in the past as well.  She has in addition hypertension, hyperlipidemia and hypothyroidism.  She has never had history of heart failure or coronary artery disease.  With this the patient was transferred from [facility] for lower GI bleed.      The patient was transferred by Dr. Holman from [facility].  She initially had a GI bleed 6 days ago while she was up at her cabin in [place].  She was discharged after being checked out on Sunday night.  However, she had ongoing GI bleed and went back on Tuesday night and has been there for 4 days.  During this hospitalization, she had a CAT scan done which showed extensive colonic diverticulosis with mild wall thickening of the rectosigmoid colon of undetermined chronicity.  There is cholelithiasis without evidence of cholecystitis, mild hepatic steatosis and small to moderate esophageal hiatal hernia.  She also had incidental noncalcified nodule in the right lower lobe.  The patient now has had a total of 8 units of packed red blood cell transfusion.  Her anticoagulation  was reversed with FFP and vitamin K.  Her most recent transfusion was early this morning.  She was transferred after the blood transfusion and after arrival here, she had more blood clots.  Remarkably, she is hemodynamically stable with respect to her blood pressure; it is 175/73.  She is afebrile, however her rhythm strip shows bigeminy.      The patient was evaluated by me, she is without any chest pain or shortness of breath or any diaphoresis or any abdominal pain.  The patient's  is been on his way.  This case was discussed with Dr. Randy Caraballo who is on-call for General Surgery and Dr. Bunch who is on-call for Interventional Radiology.  The patient is going to be taken for an emergency RBC scan.  The patient is going to be transferred to the Intensive Care Unit for more intensive monitoring as well as frequent hemoglobin checks.      PAST MEDICAL HISTORY:   1.  History of atrial fibrillation, the patient has had one failed ablation attempt, she is in sinus rhythm with flecainide and she is followed by Dr. Bee from [facility].   2.  Chronic anticoagulation secondary to problem #1.  The patient's anticoagulation has now been reversed.   3.  Hypertension.   4.  Hyperlipidemia.   5.  Hypothyroidism.   6.  History of diverticulosis with difficult colonoscopies in the past.     7.  History of lower GI bleeds in the past.      PAST SURGICAL HISTORY:   1.  History of ruptured ovary status post resection as well as a partial bowel resection a few decades ago.   2.  History of cardiac ablation with postop complications of leg clot post-procedure.   3.  History of recent hernia surgery.      SOCIAL HISTORY:  , she lives in [place] home and also is in [place] for the [place].  She is a nonsmoker, does not consume any alcohol.  She is full code.      ALLERGIES:  Sulfa, Bactrim, erythromycin, Monistat, NSAIDs, quinolones, zolpidem.      FAMILY HISTORY:  Reviewed and noncontributory.       CURRENT MEDICATION LIST:  Have yet to be verified but include warfarin, Lipitor, flecainide, Synthroid, estradiol, Lotrel, fenofibrate, calcium with vitamin D, Centrum Silver.      REVIEW OF SYSTEMS:  Ten systems reviewed and as dictated in history of present illness.  The patient denies any fevers, chills, sweats, weight loss.      PHYSICAL EXAMINATION:   VITAL SIGNS:  Temperature 98.3, heart rate 78, respirations 18, blood pressure 175/73, sats 98% on room air.   GENERAL:  The patient is an 82-year-old  female; she is pleasant, cooperative in no distress.   HEENT:  Unremarkable.  Pupils equal, sclerae are anicteric.  Mucous membranes are moist.   NECK:  Veins not distended.   LUNGS:  Clear to auscultation.   CARDIOVASCULAR:  S1, S2, she has frequent PVCs and in fact is in bigeminy.   ABDOMEN:  Soft, normoactive bowel sounds.   EXTREMITIES:  Show no edema.   NEUROLOGICALLY:  She is grossly nonfocal, cranial nerves grossly intact.      LABS & STUDIES:  Pending at the time of dictation including a CBC, BMP and INR.      ASSESSMENT:  Lola Coto is an 82-year-old female with history of A-fib on anticoagulation and prior history of GI bleed admitted now after being in an outside hospital with nearly a week worth of bloody stools presumably a lower GI with prior history of diverticulosis who has had 8 units of packed red blood cell transfusion who is currently vascularly stable but still has ongoing GI bleed, being admitted to our Intensive Care Unit for further monitoring and further intervention both diagnostically and interventionally who is full code.      PLAN:   1.  Suspected severe lower GI bleed.  The patient is at least 6-days into her GI bleed and has had 8 units of packed red blood cells.  The patient will be taken for an emergent RBC tag study.  Both Interventional Radiology and General Surgery have been consulted.  The patient will be monitored in the Intensive Care Unit with serial  hemoglobins.  We will continue the patient on normal saline.  For now we will make her n.p.o.   2.  Severe acute blood loss anemia.  The patient will undergo serial hemoglobins, she did receive 8 units so far in the last 4 days of her hospitalization.   3.  History of atrial fibrillation.  The patient currently is in bigeminy.  The patient will be continued on her flecainide, we will hold all other medications.  We will obviously discontinue her warfarin for now.   4.  Hyperlipidemia.  We will hold her medications.   5.  History of hypothyroidism.  We will give her Synthroid once we get her medication verified but we will give it IV for now.   6.  Hypertension.  We will hold all her blood pressure medications and give her IV metoprolol to keep her blood pressures less than 160, she is actually hypertensive.   7.  Bigeminy.  The patient will be monitored on telemetry bed, we will do serial troponins.  She denies any chest pain or shortness of breath.  We will maintain her hemoglobin greater than 8.  She denies any prior history of ischemic coronary artery disease.   8.  DVT (deep venous thrombosis) prophylaxis.  The patient will have compression boots   9.  CODE STATUS:  Full.         KARY YEH MD             D: 2017 15:05   T: 2017 16:39   MT: [id number]      Name:     JOSE RAMON LOGAN   MRN:      [id number]        Account:      [id number]   :      1934           Admitted:     [id number]      Document: [id number]       cc: Tram Razo MD

## 2017-09-02 NOTE — IP AVS SNAPSHOT
MRN:[id number]                      After Visit Summary   9/2/2017    Lola Coto    MRN: [id number]           Thank you!     Thank you for choosing [facility] for your care. Our goal is always to provide you with excellent care. Hearing back from our patients is one way we can continue to improve our services. Please take a few minutes to complete the written survey that you may receive in the mail after you visit with us. Thank you!        Patient Information     Date Of Birth          9/29/1934        Designated Caregiver       Most Recent Value    Caregiver    Will someone help with your care after discharge? yes    Name of designated caregiver Harjinder Coto    Phone number of caregiver [phone] [[phone] home]    Caregiver address [address]      About your hospital stay     You were admitted on:  September 2, 2017 You last received care in the:  [facility] [address] [facility]    You were discharged on:  September 9, 2017        Reason for your hospital stay       Admitted due to lower GI bleed, required a hemicolectomy for control of bleeding                  Who to Call     For medical emergencies, please call 911.  For non-urgent questions about your medical care, please call your primary care provider or clinic, [phone]  For questions related to your surgery, please call your surgery clinic        Attending Provider     Provider Specialty    Marshal Rodrigues MD Internal Medicine    [facility] Adi Vera [place] Internal Medicine       Primary Care Provider Office Phone # Fax #    Rodger Razo [phone]      After Care Instructions     Activity       Avoid strenuous activity or heavy lifting >20 pounds for 2-3 weeks.            Diet       Follow a low fiber diet until you see Dr. Caraballo again for recheck.            Diet       Follow this diet upon discharge: Orders Placed This Encounter      Advance Diet as Tolerated: Low Residue  Diet; Low Residue Diet      Diet            Discharge Instructions       Make an appointment for INR check early next week.    We also recommend that you call your cardiologist to recheck with him regarding your warfarin therapy in 1-2 weeks if you have any questions or concerns regarding this.            Wound care and dressings       Keep incision clean and dry. Steri strips will fall off in 7-10 days. If they are still in place when you come to see Dr. Caraballo for your appointment he will remove them. You may shower normally, allowing warm water to run over your incision. Pat the incision dry. Do not submerge or soak your incision under water (bath tub, hot tub, pool) for 2 weeks.                  Follow-up Appointments     Follow-up and recommended labs and tests        Please follow-up in 1-2 weeks in Dr. Caraballo's office for your first postoperative appointment. Call [phone] to schedule. Our clinic's name [facility]. The address is [address] [place] [address], [facility]            Follow-up and recommended labs and tests        Follow up with primary care provider, Rodger Razo, within 7 days for hospital follow- up.  The following labs/tests are recommended: CBC,  INR, potassium  Recommend INR check midweek, further INR and coumadin dosing per coumadin clinic    Follow-up with surgery per their recommendation  Follow-up with Dr. Bee as outpatient.  Patient would like copies of her records to take to her physician in [place]                  Further instructions from your care team       Leave IV bandage in place for at least 24 hours.    Pending Results     No orders found from 8/31/2017 to 9/3/2017.            Statement of Approval     Ordered          09/09/17 1211  I have reviewed and agree with all the recommendations and orders detailed in this document.  EFFECTIVE NOW     Approved and electronically signed by:  Merry Lange MD             Admission Information      "Date & Time Provider Department Dept. Phone    2017 Adi Cifuentes MD [place] [address] [facility] [phone]      Your Vitals Were     Blood Pressure Pulse Temperature Respirations Height Weight    124/71 93 98.5  F (36.9  C) (Oral) 16 1.626 m (5' 4\") 55.8 kg (123 lb 0.3 oz)    Last Period Pulse Oximetry BMI (Body Mass Index)             (LMP Unknown) 94% 21.12 kg/m2         [employer] Information     [employer] lets you send messages to your doctor, view your test results, renew your prescriptions, schedule appointments and more. To sign up, go to [email or web address] . Click on \"Log in\" on the left side of the screen, which will take you to the Welcome page. Then click on \"Sign up Now\" on the right side of the page.     You will be asked to enter the access code listed below, as well as some personal information. Please follow the directions to create your username and password.     Your access code is: [email or web address]  Expires: 2017 12:14 PM     Your access code will  in 90 days. If you need help or a new code, please call your [facility] clinic or [phone].        Care EveryWhere ID     This is your Care EveryWhere ID. This could be used by other organizations to access your [facility] medical records  [id number]        Equal Access to Services     JINNY WAGGONER AH: Hadii jovanna valencia hadasho Somaritzaali, waaxda luqadaha, qaybta kaalmada adeegyada, anuj de la rosa. So [facility] [phone].    ATENCIÓN: Si habla español, tiene a delacruz disposición servicios gratuitos de asistencia lingüística. Gasper draper [phone].    We comply with applicable federal civil rights laws and [place] laws. We do not discriminate on the basis of race, color, national origin, age, disability sex, sexual orientation or gender identity.               Review of your medicines      START taking        Dose / Directions    ferrous sulfate 325 (65 FE) MG tablet   Commonly known as:  IRON        " Dose:  325 mg   Take 1 tablet (325 mg) by mouth daily (with breakfast)   Quantity:  30 tablet   Refills:  2       oxyCODONE 5 MG IR tablet   Commonly known as:  ROXICODONE   Used for:  Postoperative pain        Dose:  5-10 mg   Take 1-2 tablets (5-10 mg) by mouth every 4 hours as needed for moderate to severe pain   Quantity:  20 tablet   Refills:  0         CONTINUE these medicines which have NOT CHANGED        Dose / Directions    amLODIPine 10 MG tablet   Commonly known as:  NORVASC   Used for:  Benign essential hypertension        Dose:  10 mg   Take 1 tablet (10 mg) by mouth daily   Quantity:  90 tablet   Refills:  3       atorvastatin 10 MG tablet   Commonly known as:  LIPITOR   Used for:  Essential hypertension with goal blood pressure less than 140/90        TAKE 1 TABLET BY MOUTH ONCE DAILY   Quantity:  90 tablet   Refills:  3       benazepril 40 MG tablet   Commonly known as:  LOTENSIN   Used for:  Benign essential hypertension   Notes to Patient:  Resume prior to admission schedule        Dose:  40 mg   Take 1 tablet (40 mg) by mouth daily   Quantity:  90 tablet   Refills:  3       Calcium-Vitamin D 600-200 MG-UNIT Tabs   Notes to Patient:  Resume prior to admission schedule        Dose:  1 tablet   Take 1 tablet by mouth daily   Refills:  0       CENTRUM SILVER PO   Notes to Patient:  Resume prior to admission schedule        Dose:  1 tablet   Take 1 tablet by mouth daily   Refills:  0       estradiol 0.025 MG/24HR Ptwk WK patch   Commonly known as:  FEMPATCH   Used for:  Hormone replacement therapy (postmenopausal)   Notes to Patient:  Resume prior to admission schedule        UNWRAP AND APPLY 1 PATCH TRANSDERMALLY EVERY WEEK   Quantity:  12 patch   Refills:  3       fenofibrate 145 MG tablet   Used for:  Hyperlipidemia LDL goal <100        TAKE 1 TABLET BY MOUTH EVERY DAY   Quantity:  90 tablet   Refills:  3       flecainide 100 MG tablet   Commonly known as:  TAMBOCOR   Used for:  Paroxysmal atrial  fibrillation (H)        Dose:  100 mg   Take 1 tablet (100 mg) by mouth 2 times daily   Quantity:  180 tablet   Refills:  3       levothyroxine 50 MCG tablet   Commonly known as:  SYNTHROID/LEVOTHROID   Used for:  Hypothyroidism, unspecified type        TAKE 1 TABLET BY MOUTH ONCE DAILY   Quantity:  90 tablet   Refills:  3       metFORMIN 500 MG tablet   Commonly known as:  GLUCOPHAGE   Used for:  Type 2 diabetes mellitus without complication (H)        Dose:  500 mg   Take 1 tablet (500 mg) by mouth 3 times daily (with meals) Patient takes one in the morning and two in the evening   Quantity:  270 tablet   Refills:  3       warfarin 2 MG tablet   Commonly known as:  COUMADIN   Used for:  Paroxysmal atrial fibrillation (H)        Dose:  4 mg   Take 2 tablets (4 mg) by mouth daily   Quantity:  180 tablet   Refills:  3            Where to get your medicines      These medications were sent to [facility] - [address]  [address], [place] [address]     Phone:  [phone]     ferrous sulfate 325 (65 FE) MG tablet         Some of these will need a paper prescription and others can be bought over the counter. Ask your nurse if you have questions.     Bring a paper prescription for each of these medications     oxyCODONE 5 MG IR tablet                Protect others around you: Learn how to safely use, store and throw away your medicines at www.disposemymeds.org.             Medication List: This is a list of all your medications and when to take them. Check marks below indicate your daily home schedule. Keep this list as a reference.      Medications           Morning Afternoon Evening Bedtime As Needed    amLODIPine 10 MG tablet   Commonly known as:  NORVASC   Take 1 tablet (10 mg) by mouth daily   Last time this was given:  5 mg on 9/9/2017  8:40 AM                                   atorvastatin 10 MG tablet   Commonly known as:  LIPITOR   TAKE 1 TABLET BY MOUTH ONCE  DAILY   Last time this was given:  10 mg on 9/9/2017  8:41 AM                                   benazepril 40 MG tablet   Commonly known as:  LOTENSIN   Take 1 tablet (40 mg) by mouth daily   Notes to Patient:  Resume prior to admission schedule                                Calcium-Vitamin D 600-200 MG-UNIT Tabs   Take 1 tablet by mouth daily   Notes to Patient:  Resume prior to admission schedule                                CENTRUM SILVER PO   Take 1 tablet by mouth daily   Last time this was given:  1 tablet on 9/9/2017  8:41 AM   Notes to Patient:  Resume prior to admission schedule                                estradiol 0.025 MG/24HR Ptwk WK patch   Commonly known as:  FEMPATCH   UNWRAP AND APPLY 1 PATCH TRANSDERMALLY EVERY WEEK   Notes to Patient:  Resume prior to admission schedule                                fenofibrate 145 MG tablet   TAKE 1 TABLET BY MOUTH EVERY DAY   Last time this was given:  160 mg on 9/9/2017  8:41 AM                                   ferrous sulfate 325 (65 FE) MG tablet   Commonly known as:  IRON   Take 1 tablet (325 mg) by mouth daily (with breakfast)   Last time this was given:  325 mg on 9/9/2017  8:40 AM                                   flecainide 100 MG tablet   Commonly known as:  TAMBOCOR   Take 1 tablet (100 mg) by mouth 2 times daily   Last time this was given:  100 mg on 9/9/2017  8:41 AM                                      levothyroxine 50 MCG tablet   Commonly known as:  SYNTHROID/LEVOTHROID   TAKE 1 TABLET BY MOUTH ONCE DAILY   Last time this was given:  50 mcg on 9/9/2017  6:29 AM                                   metFORMIN 500 MG tablet   Commonly known as:  GLUCOPHAGE   Take 1 tablet (500 mg) by mouth 3 times daily (with meals) Patient takes one in the morning and two in the evening                                      oxyCODONE 5 MG IR tablet   Commonly known as:  ROXICODONE   Take 1-2 tablets (5-10 mg) by mouth every 4 hours as needed for moderate to  severe pain                                   warfarin 2 MG tablet   Commonly known as:  COUMADIN   Take 2 tablets (4 mg) by mouth daily   Next Dose Due:  9/9/17 with dinner

## 2017-09-02 NOTE — IP AVS SNAPSHOT
[place] [facility] [address] [facility]    [address] [place] [address]    Phone:  [phone]                                       After Visit Summary   9/2/2017    Lola Coto    MRN: [id number]           After Visit Summary Signature Page     I have received my discharge instructions, and my questions have been answered. I have discussed any challenges I see with this plan with the nurse or doctor.    ..........................................................................................................................................  Patient/Patient Representative Signature      ..........................................................................................................................................  Patient Representative Print Name and Relationship to Patient    ..................................................               ................................................  Date                                            Time    ..........................................................................................................................................  Reviewed by Signature/Title    ...................................................              ..............................................  Date                                                            Time

## 2017-09-02 NOTE — PLAN OF CARE
Problem: Goal Outcome Summary  Goal: Goal Outcome Summary  Outcome: No Change  [id number]  Returned from [facility]  Neuro intact  GI: no stools. VSS.  Awaiting results re Red tag study

## 2017-09-02 NOTE — PHARMACY-ADMISSION MEDICATION HISTORY
Admission medication history interview status for the 9/2/2017  admission is complete. See EPIC admission navigator for prior to admission medications     Medication history source reliability:Good    Actions taken by pharmacist (provider contacted, etc):information from Lucie and Dr. Razo appointment info from [facility], which pt says is accurate.      Additional medication history information not noted on PTA med list :for time of last doses, see MAR from transferring hospital. Hospital administered meds are NOT included on this HOME med list.    Medication reconciliation/reorder completed by provider prior to medication history? No    Time spent in this activity: 20 minutes    Prior to Admission medications    Medication Sig Last Dose Taking? Auth Provider   amLODIPine (NORVASC) 10 MG tablet Take 1 tablet (10 mg) by mouth daily   Rodger Razo MD   benazepril (LOTENSIN) 40 MG tablet Take 1 tablet (40 mg) by mouth daily   Rodger Razo MD   levothyroxine (SYNTHROID, LEVOTHROID) 50 MCG tablet TAKE 1 TABLET BY MOUTH ONCE DAILY   Rodger Razo MD   fenofibrate 145 MG tablet TAKE 1 TABLET BY MOUTH EVERY DAY   Rodger Razo MD   atorvastatin (LIPITOR) 10 MG tablet TAKE 1 TABLET BY MOUTH ONCE DAILY   Rodger Razo MD   metFORMIN (GLUCOPHAGE) 500 MG tablet Take 1 tablet (500 mg) by mouth 3 times daily (with meals) Patient takes one in the morning and two in the evening   Rodger Razo MD   estradiol (FEMPATCH) 0.025 MG/24HR PTWK UNWRAP AND APPLY 1 PATCH TRANSDERMALLY EVERY WEEK   Rodger Razo MD   warfarin (COUMADIN) 2 MG tablet Take 2 tablets (4 mg) by mouth daily   Rodger Razo MD   flecainide (TAMBOCOR) 100 MG tablet Take 1 tablet (100 mg) by mouth 2 times daily   Rodger Razo MD   Calcium-Vitamin D 600-200 MG-UNIT TABS Take 1 tablet by mouth daily    Reported, Patient   Multiple Vitamins-Minerals (CENTRUM SILVER PO) Take 1 tablet by mouth daily    Reported, Patient

## 2017-09-02 NOTE — CONSULTS
Surgery  Pt with GI bleed for four days.  Transferred to [facility].  RBC study very convincing of bleed in right colon.  Will schedule for R hemicolectomy 9/3 AM.  NPO after midnight.  Sukumar Caraballo MD  General Surgery, Office [phone]

## 2017-09-02 NOTE — PLAN OF CARE
Problem: Gastrointestinal Bleeding (Adult)  Goal: Signs and Symptoms of Listed Potential Problems Will be Absent or Manageable (Gastrointestinal Bleeding)  Signs and symptoms of listed potential problems will be absent or manageable by discharge/transition of care (reference Gastrointestinal Bleeding (Adult) CPG).  Outcome: No Change  Pt. Arrived from [facility] at 1530- Dx. Of GI bleed admit Hgb. 6 and she received 8 units of blood at that hospital.   On arrival to CCU- patient stood to get into bed and also sat on commode and had a bloody red/clotty bowel movement.   Sinus rhythm with bigemonal and trigemonal PVC's.  Initial BP high and recheck improved.  Denies pain.   Dr. Rodrigues here to assess patient. Order to transfer to ICU placed.   To [facility] [place] [place] at 1530 for GI study and then transfer to ICU.  Report called to ICU.    is on his way from [place].

## 2017-09-03 ENCOUNTER — ANESTHESIA (OUTPATIENT)
Dept: SURGERY | Facility: CLINIC | Age: 82
DRG: 330 | End: 2017-09-03
Payer: MEDICARE

## 2017-09-03 LAB
ABO + RH BLD: NORMAL
ABO + RH BLD: NORMAL
BLD GP AB SCN SERPL QL: NORMAL
BLOOD BANK CMNT PATIENT-IMP: NORMAL
GLUCOSE BLDC GLUCOMTR-MCNC: 121 MG/DL (ref 70–99)
GLUCOSE BLDC GLUCOMTR-MCNC: 137 MG/DL (ref 70–99)
GLUCOSE BLDC GLUCOMTR-MCNC: 141 MG/DL (ref 70–99)
GLUCOSE BLDC GLUCOMTR-MCNC: 53 MG/DL (ref 70–99)
GLUCOSE BLDC GLUCOMTR-MCNC: 88 MG/DL (ref 70–99)
GLUCOSE BLDC GLUCOMTR-MCNC: 98 MG/DL (ref 70–99)
HGB BLD-MCNC: 10.3 G/DL (ref 11.7–15.7)
HGB BLD-MCNC: 10.3 G/DL (ref 11.7–15.7)
HGB BLD-MCNC: 8.6 G/DL (ref 11.7–15.7)
HGB BLD-MCNC: 8.9 G/DL (ref 11.7–15.7)
HGB BLD-MCNC: 9.5 G/DL (ref 11.7–15.7)
SPECIMEN EXP DATE BLD: NORMAL
TROPONIN I SERPL-MCNC: 0.04 UG/L (ref 0–0.04)

## 2017-09-03 PROCEDURE — 25000132 ZZH RX MED GY IP 250 OP 250 PS 637: Mod: GY | Performed by: INTERNAL MEDICINE

## 2017-09-03 PROCEDURE — 25000128 H RX IP 250 OP 636: Performed by: SURGERY

## 2017-09-03 PROCEDURE — 88307 TISSUE EXAM BY PATHOLOGIST: CPT | Performed by: SURGERY

## 2017-09-03 PROCEDURE — 37000009 ZZH ANESTHESIA TECHNICAL FEE, EACH ADDTL 15 MIN: Performed by: SURGERY

## 2017-09-03 PROCEDURE — 88307 TISSUE EXAM BY PATHOLOGIST: CPT | Mod: 26 | Performed by: SURGERY

## 2017-09-03 PROCEDURE — 25000128 H RX IP 250 OP 636: Performed by: INTERNAL MEDICINE

## 2017-09-03 PROCEDURE — 25000125 ZZHC RX 250: Performed by: NURSE ANESTHETIST, CERTIFIED REGISTERED

## 2017-09-03 PROCEDURE — 27210794 ZZH OR GENERAL SUPPLY STERILE: Performed by: SURGERY

## 2017-09-03 PROCEDURE — 36000063 ZZH SURGERY LEVEL 4 EA 15 ADDTL MIN: Performed by: SURGERY

## 2017-09-03 PROCEDURE — 86900 BLOOD TYPING SEROLOGIC ABO: CPT | Performed by: ANESTHESIOLOGY

## 2017-09-03 PROCEDURE — 85018 HEMOGLOBIN: CPT

## 2017-09-03 PROCEDURE — 86850 RBC ANTIBODY SCREEN: CPT | Performed by: ANESTHESIOLOGY

## 2017-09-03 PROCEDURE — 25000128 H RX IP 250 OP 636: Performed by: ANESTHESIOLOGY

## 2017-09-03 PROCEDURE — 71000014 ZZH RECOVERY PHASE 1 LEVEL 2 FIRST HR: Performed by: SURGERY

## 2017-09-03 PROCEDURE — 86901 BLOOD TYPING SEROLOGIC RH(D): CPT | Performed by: ANESTHESIOLOGY

## 2017-09-03 PROCEDURE — 25000128 H RX IP 250 OP 636: Performed by: NURSE ANESTHETIST, CERTIFIED REGISTERED

## 2017-09-03 PROCEDURE — 99223 1ST HOSP IP/OBS HIGH 75: CPT | Mod: 57 | Performed by: SURGERY

## 2017-09-03 PROCEDURE — 27210995 ZZH RX 272: Performed by: SURGERY

## 2017-09-03 PROCEDURE — 85018 HEMOGLOBIN: CPT | Performed by: INTERNAL MEDICINE

## 2017-09-03 PROCEDURE — 36415 COLL VENOUS BLD VENIPUNCTURE: CPT

## 2017-09-03 PROCEDURE — 0DTF0ZZ RESECTION OF RIGHT LARGE INTESTINE, OPEN APPROACH: ICD-10-PCS | Performed by: SURGERY

## 2017-09-03 PROCEDURE — 00000146 ZZHCL STATISTIC GLUCOSE BY METER IP

## 2017-09-03 PROCEDURE — 84484 ASSAY OF TROPONIN QUANT: CPT | Performed by: INTERNAL MEDICINE

## 2017-09-03 PROCEDURE — 99233 SBSQ HOSP IP/OBS HIGH 50: CPT | Performed by: INTERNAL MEDICINE

## 2017-09-03 PROCEDURE — 25800025 ZZH RX 258

## 2017-09-03 PROCEDURE — 40000170 ZZH STATISTIC PRE-PROCEDURE ASSESSMENT II: Performed by: SURGERY

## 2017-09-03 PROCEDURE — 37000008 ZZH ANESTHESIA TECHNICAL FEE, 1ST 30 MIN: Performed by: SURGERY

## 2017-09-03 PROCEDURE — 44160 REMOVAL OF COLON: CPT | Performed by: SURGERY

## 2017-09-03 PROCEDURE — 36000093 ZZH SURGERY LEVEL 4 1ST 30 MIN: Performed by: SURGERY

## 2017-09-03 PROCEDURE — 36415 COLL VENOUS BLD VENIPUNCTURE: CPT | Performed by: INTERNAL MEDICINE

## 2017-09-03 PROCEDURE — 20000003 ZZH R&B ICU

## 2017-09-03 PROCEDURE — 25000566 ZZH SEVOFLURANE, EA 15 MIN: Performed by: SURGERY

## 2017-09-03 PROCEDURE — A9270 NON-COVERED ITEM OR SERVICE: HCPCS | Mod: GY | Performed by: INTERNAL MEDICINE

## 2017-09-03 PROCEDURE — 99207 ZZC CDG-MDM COMPONENT: MEETS MODERATE - UP CODED: CPT | Performed by: INTERNAL MEDICINE

## 2017-09-03 RX ORDER — ONDANSETRON 2 MG/ML
INJECTION INTRAMUSCULAR; INTRAVENOUS PRN
Status: DISCONTINUED | OUTPATIENT
Start: 2017-09-03 | End: 2017-09-03

## 2017-09-03 RX ORDER — DEXTROSE MONOHYDRATE 25 G/50ML
25 INJECTION, SOLUTION INTRAVENOUS ONCE
Status: COMPLETED | OUTPATIENT
Start: 2017-09-03 | End: 2017-09-03

## 2017-09-03 RX ORDER — SODIUM CHLORIDE, SODIUM LACTATE, POTASSIUM CHLORIDE, CALCIUM CHLORIDE 600; 310; 30; 20 MG/100ML; MG/100ML; MG/100ML; MG/100ML
INJECTION, SOLUTION INTRAVENOUS CONTINUOUS
Status: DISCONTINUED | OUTPATIENT
Start: 2017-09-03 | End: 2017-09-03 | Stop reason: HOSPADM

## 2017-09-03 RX ORDER — FENTANYL CITRATE 50 UG/ML
INJECTION, SOLUTION INTRAMUSCULAR; INTRAVENOUS PRN
Status: DISCONTINUED | OUTPATIENT
Start: 2017-09-03 | End: 2017-09-03

## 2017-09-03 RX ORDER — DEXTROSE MONOHYDRATE 25 G/50ML
INJECTION, SOLUTION INTRAVENOUS
Status: COMPLETED
Start: 2017-09-03 | End: 2017-09-03

## 2017-09-03 RX ORDER — FENTANYL CITRATE 50 UG/ML
50-100 INJECTION, SOLUTION INTRAMUSCULAR; INTRAVENOUS
Status: DISCONTINUED | OUTPATIENT
Start: 2017-09-03 | End: 2017-09-03 | Stop reason: HOSPADM

## 2017-09-03 RX ORDER — CEFAZOLIN SODIUM 1 G/3ML
1 INJECTION, POWDER, FOR SOLUTION INTRAMUSCULAR; INTRAVENOUS SEE ADMIN INSTRUCTIONS
Status: DISCONTINUED | OUTPATIENT
Start: 2017-09-03 | End: 2017-09-03

## 2017-09-03 RX ORDER — FENTANYL CITRATE 50 UG/ML
25-50 INJECTION, SOLUTION INTRAMUSCULAR; INTRAVENOUS
Status: DISCONTINUED | OUTPATIENT
Start: 2017-09-03 | End: 2017-09-03 | Stop reason: HOSPADM

## 2017-09-03 RX ORDER — MEPERIDINE HYDROCHLORIDE 25 MG/ML
12.5 INJECTION INTRAMUSCULAR; INTRAVENOUS; SUBCUTANEOUS EVERY 5 MIN PRN
Status: DISCONTINUED | OUTPATIENT
Start: 2017-09-03 | End: 2017-09-03 | Stop reason: HOSPADM

## 2017-09-03 RX ORDER — DEXAMETHASONE SODIUM PHOSPHATE 4 MG/ML
INJECTION, SOLUTION INTRA-ARTICULAR; INTRALESIONAL; INTRAMUSCULAR; INTRAVENOUS; SOFT TISSUE PRN
Status: DISCONTINUED | OUTPATIENT
Start: 2017-09-03 | End: 2017-09-03

## 2017-09-03 RX ORDER — LIDOCAINE HYDROCHLORIDE 20 MG/ML
INJECTION, SOLUTION INFILTRATION; PERINEURAL PRN
Status: DISCONTINUED | OUTPATIENT
Start: 2017-09-03 | End: 2017-09-03

## 2017-09-03 RX ORDER — GLYCOPYRROLATE 0.2 MG/ML
INJECTION, SOLUTION INTRAMUSCULAR; INTRAVENOUS PRN
Status: DISCONTINUED | OUTPATIENT
Start: 2017-09-03 | End: 2017-09-03

## 2017-09-03 RX ORDER — ONDANSETRON 4 MG/1
4 TABLET, ORALLY DISINTEGRATING ORAL EVERY 30 MIN PRN
Status: DISCONTINUED | OUTPATIENT
Start: 2017-09-03 | End: 2017-09-03 | Stop reason: HOSPADM

## 2017-09-03 RX ORDER — DIMENHYDRINATE 50 MG/ML
12.5 INJECTION, SOLUTION INTRAMUSCULAR; INTRAVENOUS
Status: DISCONTINUED | OUTPATIENT
Start: 2017-09-03 | End: 2017-09-03 | Stop reason: HOSPADM

## 2017-09-03 RX ORDER — ERTAPENEM 1 G/1
1 INJECTION, POWDER, LYOPHILIZED, FOR SOLUTION INTRAMUSCULAR; INTRAVENOUS EVERY 24 HOURS
Status: DISCONTINUED | OUTPATIENT
Start: 2017-09-04 | End: 2017-09-06

## 2017-09-03 RX ORDER — EPHEDRINE SULFATE 50 MG/ML
INJECTION, SOLUTION INTRAMUSCULAR; INTRAVENOUS; SUBCUTANEOUS PRN
Status: DISCONTINUED | OUTPATIENT
Start: 2017-09-03 | End: 2017-09-03

## 2017-09-03 RX ORDER — CEFAZOLIN SODIUM 2 G/100ML
2 INJECTION, SOLUTION INTRAVENOUS
Status: DISCONTINUED | OUTPATIENT
Start: 2017-09-03 | End: 2017-09-03

## 2017-09-03 RX ORDER — PROPOFOL 10 MG/ML
INJECTION, EMULSION INTRAVENOUS PRN
Status: DISCONTINUED | OUTPATIENT
Start: 2017-09-03 | End: 2017-09-03

## 2017-09-03 RX ORDER — ERTAPENEM 1 G/1
1 INJECTION, POWDER, LYOPHILIZED, FOR SOLUTION INTRAMUSCULAR; INTRAVENOUS
Status: COMPLETED | OUTPATIENT
Start: 2017-09-03 | End: 2017-09-03

## 2017-09-03 RX ORDER — ONDANSETRON 2 MG/ML
4 INJECTION INTRAMUSCULAR; INTRAVENOUS EVERY 30 MIN PRN
Status: DISCONTINUED | OUTPATIENT
Start: 2017-09-03 | End: 2017-09-03 | Stop reason: HOSPADM

## 2017-09-03 RX ORDER — HYDROMORPHONE HYDROCHLORIDE 1 MG/ML
.3-.5 INJECTION, SOLUTION INTRAMUSCULAR; INTRAVENOUS; SUBCUTANEOUS EVERY 5 MIN PRN
Status: DISCONTINUED | OUTPATIENT
Start: 2017-09-03 | End: 2017-09-03 | Stop reason: HOSPADM

## 2017-09-03 RX ORDER — NEOSTIGMINE METHYLSULFATE 1 MG/ML
VIAL (ML) INJECTION PRN
Status: DISCONTINUED | OUTPATIENT
Start: 2017-09-03 | End: 2017-09-03

## 2017-09-03 RX ORDER — MAGNESIUM HYDROXIDE 1200 MG/15ML
LIQUID ORAL PRN
Status: DISCONTINUED | OUTPATIENT
Start: 2017-09-03 | End: 2017-09-03 | Stop reason: HOSPADM

## 2017-09-03 RX ORDER — PROMETHAZINE HYDROCHLORIDE 25 MG/ML
12.5 INJECTION, SOLUTION INTRAMUSCULAR; INTRAVENOUS EVERY 6 HOURS PRN
Status: DISCONTINUED | OUTPATIENT
Start: 2017-09-03 | End: 2017-09-09 | Stop reason: HOSPADM

## 2017-09-03 RX ADMIN — DEXTROSE MONOHYDRATE 25 ML: 25 INJECTION, SOLUTION INTRAVENOUS at 08:51

## 2017-09-03 RX ADMIN — DEXTROSE AND SODIUM CHLORIDE: 5; 900 INJECTION, SOLUTION INTRAVENOUS at 18:11

## 2017-09-03 RX ADMIN — FENTANYL CITRATE 50 MCG: 50 INJECTION, SOLUTION INTRAMUSCULAR; INTRAVENOUS at 13:42

## 2017-09-03 RX ADMIN — FENTANYL CITRATE 25 MCG: 50 INJECTION, SOLUTION INTRAMUSCULAR; INTRAVENOUS at 13:14

## 2017-09-03 RX ADMIN — ROCURONIUM BROMIDE 30 MG: 10 INJECTION INTRAVENOUS at 11:10

## 2017-09-03 RX ADMIN — Medication 5 MG: at 11:15

## 2017-09-03 RX ADMIN — SODIUM CHLORIDE: 9 INJECTION, SOLUTION INTRAVENOUS at 03:28

## 2017-09-03 RX ADMIN — GLYCOPYRROLATE 0.4 MG: 0.2 INJECTION, SOLUTION INTRAMUSCULAR; INTRAVENOUS at 12:48

## 2017-09-03 RX ADMIN — DEXTROSE AND SODIUM CHLORIDE: 5; 900 INJECTION, SOLUTION INTRAVENOUS at 08:51

## 2017-09-03 RX ADMIN — DEXAMETHASONE SODIUM PHOSPHATE 4 MG: 4 INJECTION, SOLUTION INTRA-ARTICULAR; INTRALESIONAL; INTRAMUSCULAR; INTRAVENOUS; SOFT TISSUE at 11:43

## 2017-09-03 RX ADMIN — SODIUM CHLORIDE, POTASSIUM CHLORIDE, SODIUM LACTATE AND CALCIUM CHLORIDE: 600; 310; 30; 20 INJECTION, SOLUTION INTRAVENOUS at 12:35

## 2017-09-03 RX ADMIN — ERTAPENEM SODIUM 1 G: 1 INJECTION, POWDER, LYOPHILIZED, FOR SOLUTION INTRAMUSCULAR; INTRAVENOUS at 11:15

## 2017-09-03 RX ADMIN — NEOSTIGMINE METHYLSULFATE 3 MG: 1 INJECTION INTRAMUSCULAR; INTRAVENOUS; SUBCUTANEOUS at 12:48

## 2017-09-03 RX ADMIN — ROCURONIUM BROMIDE 10 MG: 10 INJECTION INTRAVENOUS at 11:42

## 2017-09-03 RX ADMIN — PROPOFOL 110 MG: 10 INJECTION, EMULSION INTRAVENOUS at 11:09

## 2017-09-03 RX ADMIN — HYDROMORPHONE HYDROCHLORIDE 0.2 MG: 10 INJECTION, SOLUTION INTRAMUSCULAR; INTRAVENOUS; SUBCUTANEOUS at 14:12

## 2017-09-03 RX ADMIN — FENTANYL CITRATE 75 MCG: 50 INJECTION, SOLUTION INTRAMUSCULAR; INTRAVENOUS at 11:09

## 2017-09-03 RX ADMIN — ONDANSETRON 4 MG: 2 SOLUTION INTRAMUSCULAR; INTRAVENOUS at 14:05

## 2017-09-03 RX ADMIN — FENTANYL CITRATE 50 MCG: 50 INJECTION, SOLUTION INTRAMUSCULAR; INTRAVENOUS at 12:16

## 2017-09-03 RX ADMIN — PROMETHAZINE HYDROCHLORIDE 12.5 MG: 25 INJECTION INTRAMUSCULAR; INTRAVENOUS at 20:51

## 2017-09-03 RX ADMIN — FENTANYL CITRATE 25 MCG: 50 INJECTION, SOLUTION INTRAMUSCULAR; INTRAVENOUS at 13:22

## 2017-09-03 RX ADMIN — LEVOTHYROXINE SODIUM 50 MCG: 50 TABLET ORAL at 08:34

## 2017-09-03 RX ADMIN — ONDANSETRON 4 MG: 2 INJECTION INTRAMUSCULAR; INTRAVENOUS at 12:29

## 2017-09-03 RX ADMIN — FENTANYL CITRATE 50 MCG: 50 INJECTION, SOLUTION INTRAMUSCULAR; INTRAVENOUS at 11:56

## 2017-09-03 RX ADMIN — DEXTROSE MONOHYDRATE 25 ML: 500 INJECTION PARENTERAL at 08:51

## 2017-09-03 RX ADMIN — LIDOCAINE HYDROCHLORIDE 80 MG: 20 INJECTION, SOLUTION INFILTRATION; PERINEURAL at 11:09

## 2017-09-03 RX ADMIN — Medication 5 MG: at 11:19

## 2017-09-03 RX ADMIN — Medication 5 MG: at 11:21

## 2017-09-03 RX ADMIN — SODIUM CHLORIDE, POTASSIUM CHLORIDE, SODIUM LACTATE AND CALCIUM CHLORIDE: 600; 310; 30; 20 INJECTION, SOLUTION INTRAVENOUS at 11:04

## 2017-09-03 RX ADMIN — FENTANYL CITRATE 25 MCG: 50 INJECTION, SOLUTION INTRAMUSCULAR; INTRAVENOUS at 11:32

## 2017-09-03 RX ADMIN — FLECAINIDE ACETATE 100 MG: 100 TABLET ORAL at 08:34

## 2017-09-03 RX ADMIN — ROCURONIUM BROMIDE 5 MG: 10 INJECTION INTRAVENOUS at 12:12

## 2017-09-03 RX ADMIN — FLECAINIDE ACETATE 100 MG: 100 TABLET ORAL at 20:51

## 2017-09-03 RX ADMIN — HYDROMORPHONE HYDROCHLORIDE 0.5 MG: 1 INJECTION, SOLUTION INTRAMUSCULAR; INTRAVENOUS; SUBCUTANEOUS at 13:30

## 2017-09-03 ASSESSMENT — PAIN DESCRIPTION - DESCRIPTORS
DESCRIPTORS: SORE
DESCRIPTORS: SORE

## 2017-09-03 ASSESSMENT — ENCOUNTER SYMPTOMS: DYSRHYTHMIAS: 1

## 2017-09-03 NOTE — PROGRESS NOTES
Interventional Radiology Intra-procedural Nursing Note    Patient Name: Lola Coto  Medical Record Number: [id number]  Today's Date: September 2, 2017    Start Time: 1940  End of procedure time: 2005  Procedure: Mesenteric Angiogram with possible embolization  Report given to: Deepti KING, ICU  Time pt departs:  2025  : n/a    Other Notes: Patient into IR suite 2 via cart with NA and RN flyer at 1923 with IVF infusing to left PIV. Patient awake and alert to all spheres. Dr. Bunch spoke with family and consented patient at 1925. Patient to table, VSS, normal sinus rhythm with PVC's. Patient prepped and draped with 2% chlorhexidine to bilateral groin. Dr. Bunch in room at 1940, timeout and procedure started. 6F sheath to right groin at 1942. Patient tolerated procedure well. Versed 1.5 mg and fentanyl 75 mcg given. 6F sheath out at 2005. 6F Angioseal done. Debrief done with Dr. Bunch, no bleed to embolize, no complications during procedure. Report called to ICU. Patient back to room 365 on monitor with RN flyer on cart. Dr. Bunch spoke with family post angiogram.    Collette Molina RN

## 2017-09-03 NOTE — ANESTHESIA CARE TRANSFER NOTE
Patient: Lola Coto    Procedure(s):  EXPLORATORY LAPAROTOMY, RIGHT TYESHA COLECTOMY - Wound Class: II-Clean Contaminated    Diagnosis: unknown  Diagnosis Additional Information: No value filed.    Anesthesia Type:   General, ETT     Note:  Airway :Face Mask  Patient transferred to:PACU        Vitals: (Last set prior to Anesthesia Care Transfer)    CRNA VITALS  9/3/2017 1236 - 9/3/2017 1310      9/3/2017             Pulse: 87    SpO2: 100 %    Resp Rate (set):                 Electronically Signed By: BERYL Carlisle CRNA  September 3, 2017  1:10 PM

## 2017-09-03 NOTE — ANESTHESIA PREPROCEDURE EVALUATION
Procedure: Procedure(s):  COLECTOMY RIGHT  Preop diagnosis: unknown    Allergies   Allergen Reactions     Sulfa Drugs Anaphylaxis     Bactrim [Sulfamethoxazole W-Trimethoprim] Hives     Erythromycin GI Disturbance     Monistat [Miconazole] Dermatitis     Cream caused perineal reaction      Nsaids GI Disturbance     Past Medical History:   Diagnosis Date     Atrial fibrillation (H)      Hyperlipidaemia      Hyperlipidemia LDL goal < 100      Hypertension goal BP (blood pressure) < 140/90      Hypothyroid      Paroxysmal atrial fibrillation (H)      Type II or unspecified type diabetes mellitus without mention of complication, not stated as uncontrolled     dx approx. 2010; Rx metformin     Past Surgical History:   Procedure Laterality Date     COLONOSCOPY  10/2013    diverticulosis     COSMETIC SURGERY  1990    face lift     DENTAL SURGERY  1954    wisdom     HERNIORRHAPHY INGUINAL BILATERAL  5/23/2014    Procedure: HERNIORRHAPHY INGUINAL BILATERAL;  Surgeon: Christ Spain MD;  Location: [facility]     HYSTERECTOMY  1980    endometriosis - abdominal took ovaries     ROTATOR CUFF REPAIR RT/LT  5/13    right; in [place]     Prior to Admission medications    Medication Sig Start Date End Date Taking? Authorizing Provider   amLODIPine (NORVASC) 10 MG tablet Take 1 tablet (10 mg) by mouth daily 5/16/17  Yes Rodger Razo MD   levothyroxine (SYNTHROID, LEVOTHROID) 50 MCG tablet TAKE 1 TABLET BY MOUTH ONCE DAILY 11/2/16  Yes Rodger Razo MD   fenofibrate 145 MG tablet TAKE 1 TABLET BY MOUTH EVERY DAY 11/2/16  Yes Rodger Razo MD   atorvastatin (LIPITOR) 10 MG tablet TAKE 1 TABLET BY MOUTH ONCE DAILY 11/2/16  Yes Rodger Razo MD   flecainide (TAMBOCOR) 100 MG tablet Take 1 tablet (100 mg) by mouth 2 times daily 9/28/16  Yes Rodger Razo MD   benazepril (LOTENSIN) 40 MG tablet Take 1 tablet (40 mg) by mouth daily 5/16/17   Rodger Razo MD   metFORMIN (GLUCOPHAGE) 500 MG tablet Take 1 tablet (500 mg)  by mouth 3 times daily (with meals) Patient takes one in the morning and two in the evening 9/28/16   Rodger Razo MD   estradiol (FEMPATCH) 0.025 MG/24HR PTWK UNWRAP AND APPLY 1 PATCH TRANSDERMALLY EVERY WEEK 9/28/16   Rodger Razo MD   warfarin (COUMADIN) 2 MG tablet Take 2 tablets (4 mg) by mouth daily 9/28/16   Rodger Razo MD   Calcium-Vitamin D 600-200 MG-UNIT TABS Take 1 tablet by mouth daily     Reported, Patient   Multiple Vitamins-Minerals (CENTRUM SILVER PO) Take 1 tablet by mouth daily     Reported, Patient     Current Facility-Administered Medications Ordered in Epic   Medication Dose Route Frequency Last Rate Last Dose     chlorhexidine 2 % pads   Topical Once        Or     antimicrobial soap   Topical Once         ceFAZolin sodium-dextrose (ANCEF) infusion 2 g  2 g Intravenous Pre-Op/Pre-procedure x 1 dose         ceFAZolin (ANCEF) 1 g vial to attach to  ml bag for ADULT or 50 ml bag for PEDS  1 g Intravenous See Admin Instructions         Provider ordered ALTERNATE pre op antibiotic.  1 each As instructed Continuous         ertapenem (INVanz) 1 g vial to attach to  mL bag  1 g Intravenous Pre-Op/Pre-procedure x 1 dose         lidocaine 1 % 1 mL  1 mL Other Q1H PRN         lactated ringers infusion   Intravenous Continuous         dextrose 5% and 0.9% NaCl infusion   Intravenous Continuous 100 mL/hr at 09/03/17 0851       [Auto Hold] flecainide (TAMBOCOR) tablet 100 mg  100 mg Oral BID   100 mg at 09/03/17 0834     [Auto Hold] levothyroxine (SYNTHROID/LEVOTHROID) tablet 50 mcg  50 mcg Oral Daily   50 mcg at 09/03/17 0834     [Auto Hold] acetaminophen (TYLENOL) tablet 650 mg  650 mg Oral Q4H PRN         [Auto Hold] naloxone (NARCAN) injection 0.1-0.4 mg  0.1-0.4 mg Intravenous Q2 Min PRN         [Auto Hold] ondansetron (ZOFRAN-ODT) ODT tab 4 mg  4 mg Oral Q6H PRN        Or     [Auto Hold] ondansetron (ZOFRAN) injection 4 mg  4 mg Intravenous Q6H PRN         [Auto Hold]  metoprolol (LOPRESSOR) injection 2.5 mg  2.5 mg Intravenous Q4H PRN         No current Epic-ordered outpatient prescriptions on file.     Wt Readings from Last 1 Encounters:   09/02/17 54.8 kg (120 lb 13 oz)     Temp Readings from Last 1 Encounters:   09/03/17 36.8  C (98.3  F) (Oral)     BP Readings from Last 6 Encounters:   09/03/17 (!) 144/115   05/16/17 118/60   05/12/17 110/80   09/28/16 112/64   10/20/15 110/56   09/30/15 130/58     Pulse Readings from Last 4 Encounters:   09/02/17 78   05/16/17 65   05/12/17 57   09/28/16 65     Resp Readings from Last 1 Encounters:   09/03/17 21     SpO2 Readings from Last 1 Encounters:   09/03/17 95%     Recent Labs   Lab Test  09/02/17   1500  09/28/16   1016   NA  141  141   POTASSIUM  4.2  4.0   CHLORIDE  112*  105   CO2  17*  25   ANIONGAP  12  11.4   GLC  86  115*   BUN  11  27   CR  0.45*  0.61   ANKITA  7.0*  9.2     Recent Labs   Lab Test  05/12/17   1047  09/28/16   1016  09/30/15   1027   AST   --   20  18   ALT  25  31  31     Recent Labs   Lab Test  09/03/17   0930  09/03/17   0415   09/02/17   1500  09/28/16   1016   WBC   --    --    --   7.9  8.5   HGB  10.3*  8.6*   < >  11.0*  12.4   PLT   --    --    --   142*  241    < > = values in this interval not displayed.     Recent Labs   Lab Test  09/02/17   1500 05/16/17   INR  1.01  2.9*      Recent Labs   Lab Test  09/03/17   0020  09/02/17   1900  09/02/17   1500   TROPI  0.038  0.049*  0.051*     RECENT LABS:     ECG: NSR, PVCs    ECHO: 1/2017  EF 55-60%  Mild AR/MRTR; PASP 35-40    Anesthesia Evaluation     . Pt has had prior anesthetic. Type: General           ROS/MED HX    ENT/Pulmonary:       Neurologic:       Cardiovascular:     (+) Dyslipidemia, hypertension----. Taking blood thinners : . . . :. dysrhythmias (Paroxymal - hx of Afib ablation) a-fib, .       METS/Exercise Tolerance:     Hematologic:     (+) Anemia, History of Transfusion -      Musculoskeletal:         GI/Hepatic:          Renal/Genitourinary:         Endo:     (+) type II DM Not using insulin thyroid problem hypothyroidism, .      Psychiatric:         Infectious Disease:         Malignancy:         Other:                                    Anesthesia Plan      History & Physical Review      ASA Status:  3 .    NPO Status:  > 8 hours    Plan for General and ETT with Intravenous and Propofol induction. Maintenance will be Balanced.    PONV prophylaxis:  Ondansetron (or other 5HT-3), Dexamethasone or Solumedrol and Other (See comment) (Propofol )  Type and Screen       Postoperative Care  Postoperative pain management:  Multi-modal analgesia.      Consents

## 2017-09-03 NOTE — PROCEDURES
RADIOLOGY PROCEDURE NOTE  Patient name: Lola Coto  MRN: [id number]  : 1934    Pre-procedure diagnosis: GI bleed  Post-procedure diagnosis: Same    Procedure Date/Time: 2017  8:13 PM  Procedure: mesenteric angio  Estimated blood loss: None  Specimen(s) collected with description: none  The patient tolerated the procedure well with no immediate complications.  Significant findings:no bleeding, AVM, or PSA identified    See imaging dictation for procedural details.    Provider name: Javier Bunch  Assistant(s):None

## 2017-09-03 NOTE — BRIEF OP NOTE
[facility] Brief Operative Note    Pre-operative diagnosis: unknown   Post-operative diagnosis GI bleeding, diverticulosis   Procedure: Procedure(s):  EXPLORATORY LAPAROTOMY, RIGHT TYESHA COLECTOMY - Wound Class: II-Clean Contaminated   Surgeon(s): Surgeon(s) and Role:     * Randy Caraballo MD - Primary     * Jia Nava MD - Assisting   Estimated blood loss: 40 mL    Specimens:   ID Type Source Tests Collected by Time Destination   A : RIGHT TYESHA COLECTOMY Tissue Colon SURGICAL PATHOLOGY EXAM Randy Caraballo MD 9/3/2017 12:21 PM       Findings: Extensive diverticulosis. Colon filled with dark blood.

## 2017-09-03 NOTE — PLAN OF CARE
Problem: Goal Outcome Summary  Goal: Goal Outcome Summary  Patient AAOx4. No complaints of pain. Patient up to the commode with standby assist. 2 small BM overnight with minimal clots and bright red blood. Vital signs remain stable. NSR with bigeminy and trigeminy PVC's. Patient on room air.  Patient is frustrated with the lack of communication regarding plan for surgery. Report given to oncoming nurse. Will continue to monitor.

## 2017-09-03 NOTE — OP NOTE
General Surgery Operative Note    PREOPERATIVE DIAGNOSIS:  Lower GI bleed    POSTOPERATIVE DIAGNOSIS:  Same    PROCEDURE:  Right hemicolectomy    ANESTHESIA:  General.    [facility]    SURGEON:  Randy Caraballo MD    ASSISTANT:  Ruperto Thomas M.D.  Assistant was required owing to challenging exposure and need for retraction.    INDICATIONS:  Patient is an 82-year-old female with a long history of diverticulosis and diverticulitis.  She was admitted at an outside hospital with lower GI bleed.  She required eight blood transfusions over the course of her stay and was eventually transferred.  She underwent a tagged red blood cell study yesterday which showed a very clear bleed near the proximal right colon.  Mesenteric angiogram was not able to identify a bleeding vessel.  She now presents for right hemicolectomy.    PROCEDURE:  The patient was taken to the operating suite and uneventfully endotracheally intubated.  The abdomen was prepped and draped in a sterile fashion.  Surgeon initiated timeout was acknowledged.  We made a midline skin incision through her previous scar and took this down through skin and subcutaneous tissue.  Fascia was divided along the midline.  Omental attachments to the anterior abdominal wall were taken down with cautery.  We were able to easily deliver the right colon which was quite redundant.  This clearly contained a fair amount of blood and inspissated stool.  There were extensive diverticular changes throughout the entire length of the colon with the worst involvement at the rectosigmoid junction.  We turned our attention to the right colon and began mobilizing along the white line of Toldt.  We came through the mesentery with clamps ties and the LigaSure device.  Once the right colon was mobilized we took the blood supply up to the level of the middle colic vessels.  The distal ileum was brought to the mid transverse colon and secured with a 3-0 Vicryl.  Enterotomies  were made and a anastomosis was created with a single firing of the PENNY stapler, 80 mm.  The TA 90 was then used to close the enterotomy and the specimen was removed.  This was sent to pathology as right hemicolectomy.  Several bleeding areas along the staple line were oversewn.  The anastomosis was widely patent.  We closed the mesenteric defect with a running 3-0 Vicryl suture.  Abdomen is irrigated and the omentum was placed over the anastomosis.  Fascia was closed along the midline with looped 0 Maxon sutures.  Deep tissues closed with 3-0 Vicryl.  The skin edges were reapproximated with 4-0 Vicryl and Steri-Strips.  The patient was uneventfully extubated, awakened and taken to the PACU in stable condition.  At the conclusion of the case, all lap and needle counts were correct.      ESTIMATED BLOOD LOSS:  40 mL    INTRAOPERATIVE FINDINGS:  Extensive pancolonic diverticulosis with right-sided diverticular bleed.    Randy Caraballo MD

## 2017-09-03 NOTE — CONSULTS
[facility] Surgery Consultation    Lola Coto MRN# [id number]   Age: 82 year old YOB: 1934     Date of Admission:  9/2/2017    Reason for consult: GI bleeding       Requesting physician: Dr. Rodrigues       Level of consult: Consult, follow and place orders           Assessment and Plan:   Assessment:   Ms Coto is an 82 year old female with ongoing GI bleeding likely related to diverticulosis. A tagged RBC scan revealed pooling in the RLQ which is the likely source of this bleed. Though an angiogram could not identify the bleed, a tagged RBC scan is more sensitive and can detect a lower bleeding rate as low as 0.1 mL/minute while an angio requires a bleeding rate of 0.5-1 mL/min to identify the source.       Plan:   - To OR today for open right hemicolectomy  - NPO diet   - Continue serial hgb               Chief Complaint:   GI bleeding     History is obtained from the patient    Ms Coto reports that she has been having bloody bowel movements for several days and has required multiple transfusions. She was transferred here for further management. Overnight, she had at least 3 bloody bowel movements. She otherwise feels well.           Past Medical History:   I have reviewed this patient's past medical history.  History of diverticulitis and diverticulosis.  Last colonoscopy said to be difficult, performed 3-4 years ago.          Past Surgical History:   History of bowel resection over 30 years ago.  Bilateral open inguinal hernia repair three years ago.         Social History:   Nonsmoker          Family History:   This patient has no significant family history          Immunizations:   Not reviewed          Allergies:   All allergies reviewed and addressed          Medications:     Current Facility-Administered Medications   Medication     dextrose 5% and 0.9% NaCl infusion     flecainide (TAMBOCOR) tablet 100 mg     levothyroxine (SYNTHROID/LEVOTHROID) tablet 50 mcg     acetaminophen  (TYLENOL) tablet 650 mg     naloxone (NARCAN) injection 0.1-0.4 mg     ondansetron (ZOFRAN-ODT) ODT tab 4 mg    Or     ondansetron (ZOFRAN) injection 4 mg     metoprolol (LOPRESSOR) injection 2.5 mg             Review of Systems:   10 Point ROS negative except as noted in HPI          Data:     Healthy-appearing elderly female in no distress.  Patient has a pleasant affect and communicates well.   Pupils equal round and reactive to light.   No cervical lymphadenopathy or thyromegaly.   Lung fields clear, breathing comfortably.   Heart normal sinus rhythm.  No murmurs rubs or gallops.  Abdomen soft, nontender, nondistended.  Skin warm, dry.  No obvious rashes or lesions.    Hgb 8.6 < 8.9 < 10.1     Jia Nava MD   General Surgery Resident PGY

## 2017-09-03 NOTE — PROGRESS NOTES
"Returned to room from PACU, alert, oriented, complains of nausea, \"I feel miserable I know it was that Zofran they gave me).  Abdomen soft, lower midline dressing dry with shadow drng, ice bag to site for comfort  "

## 2017-09-03 NOTE — ANESTHESIA POSTPROCEDURE EVALUATION
Patient: Lola Coto    Procedure(s):  EXPLORATORY LAPAROTOMY, RIGHT TYESHA COLECTOMY - Wound Class: II-Clean Contaminated    Diagnosis:unknown  Diagnosis Additional Information: No value filed.    Anesthesia Type:  General, ETT    Note:  Anesthesia Post Evaluation    Patient location during evaluation: PACU  Patient participation: Able to fully participate in evaluation  Level of consciousness: responsive to verbal stimuli and sleepy but conscious  Pain management: adequate  Airway patency: patent  Cardiovascular status: acceptable  Respiratory status: acceptable  Hydration status: acceptable  PONV: none     Anesthetic complications: None          Last vitals:  Vitals:    09/03/17 1445 09/03/17 1500 09/03/17 1515   BP: 120/65 133/57 113/55   Pulse:      Resp:  14    Temp:      SpO2: 97% 97%          Electronically Signed By: Vitaliy Nicole MD  September 3, 2017  3:34 PM

## 2017-09-03 NOTE — PROGRESS NOTES
[facility]  Hospitalist Progress Note        Teena Thompson MD  09/03/2017        Interval History:      Complaining of bowel movement with blood of various colors  No chest pain or sob         Assessment and Plan:      82-year-old female with history of Atrial fibrillation was  on anticoagulation and prior history of GI bleed admitted on 9/2  from [facility] after one week stay with bloody stools presumably a lower GI bleed and had 8 units of packed red blood cell transfusion who is stable now but still has ongoing GI bleed she is  admitted to our Intensive Care Unit     GI bleeding:  -got 8 units of PRBC in hospital at [place]  -RBC study very convincing of bleed in right colon.    -Per  note she is scheduled for R hemicolectomy 9/3 AM.    -she is NPO after midnight.  -patient also had mesenteric angiogram which was  negative  -due to low blood glucose IVF switched to D5 0.9 at 100 ml per hour    Severe acute blood loss anemia:  -she did receive 8 units so far in the last 4 days of her hospitalization at outside facility    Recent Labs  Lab 09/03/17  1508 09/03/17  0930 09/03/17  0415 09/03/17  0020 09/02/17  1900   HGB 9.5* 10.3* 8.6* 8.9* 10.1*     History of atrial fibrillation:  -The patient currently is in bigeminy.    -Continue on her flecainide,  - hold all other medications.   - discontinue her warfarin for now.     Hyperlipidemia:  -hold her medication due to GI bleed    History of hypothyroidism:  - continue levothyroxine    Hypertension:  -We will hold all her blood pressure medications and give her IV metoprolol to keep her blood pressures less than 160, she is actually hypertensive.     Bigeminy:  -She denies any chest pain or shortness of breath.    -We will maintain her hemoglobin greater than 8.    -She denies any prior history of ischemic coronary artery disease.     DVT (deep venous thrombosis) prophylaxis:  -PCD    CODE STATUS:    -Full.     Disposition:  -pending  "clinical course.  -may need several days             Physical Exam:      Heart Rate: 88, Blood pressure 113/55, pulse 78, temperature 97.6  F (36.4  C), temperature source Oral, resp. rate 14, height 1.626 m (5' 4\"), weight 54.8 kg (120 lb 13 oz), SpO2 97 %, not currently breastfeeding.  Vitals:    09/02/17 1330 09/02/17 1740   Weight: 56.7 kg (125 lb) 54.8 kg (120 lb 13 oz)     Vital Signs with Ranges  Temp:  [97.3  F (36.3  C)-98.3  F (36.8  C)] 97.6  F (36.4  C)  Heart Rate:  [] 88  Resp:  [8-21] 14  BP: (102-161)/() 113/55  SpO2:  [95 %-100 %] 97 %  I/O's Last 24 hours  I/O last 3 completed shifts:  In: 1288.33 [I.V.:1288.33]  Out: 1890 [Urine:1850; Blood:40]    Constitutional: Alert awake oriented     Lungs: Good air entry on both sides of lungs     Cardiovascular: S1 and S2 no S3      Abdomen: Abdomen soft no guarding ,no rigidity, bowel sounds are positive     Skin:    Other:           Medications:          [START ON 9/4/2017] ertapenem (INVanz) IV  1 g Intravenous Q24H     HYDROmorphone  0.2-0.4 mg Intravenous PCA     flecainide  100 mg Oral BID     levothyroxine  50 mcg Oral Daily     PRN Meds: lidocaine (buffered or not buffered), acetaminophen, naloxone, ondansetron **OR** ondansetron, metoprolol         Data:      All new lab and imaging data was reviewed.   Recent Labs   Lab Test  09/03/17   1508  09/03/17   0930  09/03/17   0415   09/02/17   1500 05/16/17 09/28/16   1016 10/15/15   09/30/15   1027   WBC   --    --    --    --   7.9   --   8.5   --    --   6.6   HGB  9.5*  10.3*  8.6*   < >  11.0*   --   12.4   --    --   12.1   MCV   --    --    --    --   87   --   91   --    --   90   PLT   --    --    --    --   142*   --   241   --    --   256   INR   --    --    --    --   1.01  2.9*   --   2.2*   < >   --     < > = values in this interval not displayed.      Recent Labs   Lab Test  09/02/17   1500  09/28/16   1016  09/30/15   1027   NA  141  141  139   POTASSIUM  4.2  4.0  4.2 "   CHLORIDE  112*  105  104   CO2  17*  25  25   BUN  11  27  23   CR  0.45*  0.61  0.60   ANIONGAP  12  11.4  9.7   ANKITA  7.0*  9.2  9.4   GLC  86  115*  109*     Recent Labs   Lab Test  09/03/17   0020  09/02/17   1900  09/02/17   1500   TROPI  0.038  0.049*  0.051*

## 2017-09-04 LAB
ANION GAP SERPL CALCULATED.3IONS-SCNC: 12 MMOL/L (ref 3–14)
BUN SERPL-MCNC: 6 MG/DL (ref 7–30)
CALCIUM SERPL-MCNC: 7.5 MG/DL (ref 8.5–10.1)
CHLORIDE SERPL-SCNC: 114 MMOL/L (ref 94–109)
CO2 SERPL-SCNC: 17 MMOL/L (ref 20–32)
CREAT SERPL-MCNC: 0.43 MG/DL (ref 0.52–1.04)
GFR SERPL CREATININE-BSD FRML MDRD: >90 ML/MIN/1.7M2
GLUCOSE BLDC GLUCOMTR-MCNC: 149 MG/DL (ref 70–99)
GLUCOSE BLDC GLUCOMTR-MCNC: 185 MG/DL (ref 70–99)
GLUCOSE SERPL-MCNC: 175 MG/DL (ref 70–99)
HGB BLD-MCNC: 7.8 G/DL (ref 11.7–15.7)
HGB BLD-MCNC: 8.7 G/DL (ref 11.7–15.7)
MAGNESIUM SERPL-MCNC: 1.7 MG/DL (ref 1.6–2.3)
POTASSIUM SERPL-SCNC: 3.9 MMOL/L (ref 3.4–5.3)
SODIUM SERPL-SCNC: 143 MMOL/L (ref 133–144)

## 2017-09-04 PROCEDURE — 99233 SBSQ HOSP IP/OBS HIGH 50: CPT | Performed by: INTERNAL MEDICINE

## 2017-09-04 PROCEDURE — 27211289 ZZ H KIT CATH IV 4FR 8 CM OR 10 CM, POWERWAND QUICK XL

## 2017-09-04 PROCEDURE — 83735 ASSAY OF MAGNESIUM: CPT

## 2017-09-04 PROCEDURE — 00000146 ZZHCL STATISTIC GLUCOSE BY METER IP

## 2017-09-04 PROCEDURE — A9270 NON-COVERED ITEM OR SERVICE: HCPCS | Mod: GY | Performed by: INTERNAL MEDICINE

## 2017-09-04 PROCEDURE — 12000007 ZZH R&B INTERMEDIATE

## 2017-09-04 PROCEDURE — 80048 BASIC METABOLIC PNL TOTAL CA: CPT

## 2017-09-04 PROCEDURE — 25000132 ZZH RX MED GY IP 250 OP 250 PS 637: Mod: GY | Performed by: INTERNAL MEDICINE

## 2017-09-04 PROCEDURE — 36415 COLL VENOUS BLD VENIPUNCTURE: CPT

## 2017-09-04 PROCEDURE — 25000128 H RX IP 250 OP 636

## 2017-09-04 PROCEDURE — 85018 HEMOGLOBIN: CPT

## 2017-09-04 PROCEDURE — 85018 HEMOGLOBIN: CPT | Performed by: INTERNAL MEDICINE

## 2017-09-04 PROCEDURE — 36569 INSJ PICC 5 YR+ W/O IMAGING: CPT

## 2017-09-04 PROCEDURE — 25000128 H RX IP 250 OP 636: Performed by: INTERNAL MEDICINE

## 2017-09-04 RX ORDER — LIDOCAINE 40 MG/G
CREAM TOPICAL
Status: DISCONTINUED | OUTPATIENT
Start: 2017-09-04 | End: 2017-09-09 | Stop reason: HOSPADM

## 2017-09-04 RX ORDER — SODIUM CHLORIDE 9 MG/ML
INJECTION, SOLUTION INTRAVENOUS CONTINUOUS
Status: DISCONTINUED | OUTPATIENT
Start: 2017-09-04 | End: 2017-09-08

## 2017-09-04 RX ADMIN — FLECAINIDE ACETATE 100 MG: 100 TABLET ORAL at 08:30

## 2017-09-04 RX ADMIN — LEVOTHYROXINE SODIUM 50 MCG: 50 TABLET ORAL at 08:31

## 2017-09-04 RX ADMIN — FLECAINIDE ACETATE 100 MG: 100 TABLET ORAL at 20:44

## 2017-09-04 RX ADMIN — ERTAPENEM SODIUM 1 G: 1 INJECTION, POWDER, LYOPHILIZED, FOR SOLUTION INTRAMUSCULAR; INTRAVENOUS at 09:53

## 2017-09-04 RX ADMIN — SODIUM CHLORIDE: 9 INJECTION, SOLUTION INTRAVENOUS at 17:29

## 2017-09-04 RX ADMIN — SODIUM CHLORIDE: 9 INJECTION, SOLUTION INTRAVENOUS at 04:20

## 2017-09-04 RX ADMIN — HYDROMORPHONE HYDROCHLORIDE 0.2 MG: 10 INJECTION, SOLUTION INTRAMUSCULAR; INTRAVENOUS; SUBCUTANEOUS at 06:13

## 2017-09-04 NOTE — PLAN OF CARE
Problem: Goal Outcome Summary  Goal: Goal Outcome Summary  Outcome: No Change  Pt to room 330-2 via w/c from ICU at 1240.  A&OX4, VSS.  Pain 5/10 with PCA.  Abdominal dressing CD&I. Hypoactive BS, no flatus, no nausea.  Carter patent with clear yellow urine.  Declining for me to discontinue carter at this time, states she'd be agreeable later this evening. Up with 1. Oriented to room and daily POC.

## 2017-09-04 NOTE — PROGRESS NOTES
General Surgery Progress Note    S:  Ms Coto reports that she was not feeling well secondary to receiving zofran. She has otherwise been feeling well. She denies any nausea this morning. She is not passing any gas and has not had a BM since the OR. Her pain is moderate, mostly just a sore feeling.     O:  Physical exam  General: Adult woman in NAD  B/P: 131/59, T: 99, P: 78, R: 17  CV: Extremities WWP. RRR  Resp: Breathing comfortably on minimal O2 by NC  Abd: Soft, mildly tender. Dressing with no drainage visible.     Labs - Reviewed, hgb appears to shift from the mid-8s to 10s from draw to draw    A/P:  Ms Coto is an 82 year old female now POD1 from right hemicolectomy for likely diverticular bleeding who is doing very well. Though her hgb this morning is down to the mid-8s, this appears consistent with the variability she has had during this hospital stay so far. Doubtful that she is still bleeding especially since she is not having any more bowel movements. Her colon was quite full of dark blood so would expect her first couple bowel movements to still have dark blood in them.     - Pain: Continue dilaudid PCA  - CV: HDS  - Resp: Breathing comfortably on minimal O2  - Diet: continue NPO diet for today. Ok for ice chips.   - : Remove carter today  - ID: 1 dose of invanz today for post surgical prophylaxis  - Proph: Hold until POD2 and hgb stable  - Bandage: Surgeon to remove tomorrow  - Activity: up with assist  - Dispo: Safe to transfer out of ICU from surgical perspective. Will defer to hospitalist team    Jia Nava MD  Surgery Resident, PGY4  Pager: [phone]    Agree with above.  Hemoglobin drift not concerning.  Okay for transfer, keep n.p.o.  Sukumar Caraballo M.D.  [facility]  [phone]

## 2017-09-04 NOTE — PLAN OF CARE
Problem: Goal Outcome Summary  Goal: Goal Outcome Summary  Outcome: Improving  VSS. A/O. Up-1. abdo incision CDI. PCA infusing with minimal pain. NPO, ice. No N/V. Hypo BS. No gas. Smith removed @ 1815. Due to void. Mapilex intact on coccyx.

## 2017-09-04 NOTE — PROGRESS NOTES
HOSPITALIST SERVICE CROSS-COVER NOTE:    Changed IVF to NS due to hyperglycemia .      Jana Tidwell MD  Hospitalist  Pager # [phone]

## 2017-09-04 NOTE — PROGRESS NOTES
[facility]  Hospitalist Progress Note        Teena Thompson MD  09/04/2017        Interval History:      She had right hemicolectomy yesterday.  Complaining of nausea  Pain is manageable         Assessment and Plan:      82-year-old female with history of Atrial fibrillation was  on anticoagulation and prior history of GI bleed admitted on 9/2  from [facility] after one week stay with bloody stools presumably a lower GI bleed and had 8 units of packed red blood cell transfusion she was admitted to our ICU for ongoing GI bleeding and had right hemicolectomy on 9/3/17 as RBC scan was positive       GI bleeding:  -S/P exploratory laparotomy and right hemicolectomy;  -POD#1  -got 8 units of PRBC in hospital at [place]  -RBC study very convincing of bleed in right colon.    -patient also had mesenteric angiogram which was  negative  -she had R hemicolectomy 9/3   -she is NPO   -had adverse reaction to zofran  -she is on IV ertapenam    Elevated glucose:  -due to D5 9 saline given due to low BS but now discontinued due to elevated glucose     Severe acute blood loss anemia:  -she did receive 8 units during her  hospitalization at outside facility  -    Recent Labs  Lab 09/04/17  0600 09/03/17  1825 09/03/17  1508 09/03/17  0930 09/03/17  0415   HGB 8.7* 10.3* 9.5* 10.3* 8.6*            History of atrial fibrillation:  -The patient currently having bigeminy and trigeminy  -Continue on her flecainide,  - discontinue her warfarin for now.   -resume warfarin when ok by general surgeon.     Hyperlipidemia:  -hold her medication due to NPO status     History of hypothyroidism:  - continue levothyroxine     Hypertension:  -We will hold all her blood pressure medications and give her IV metoprolol to keep her blood pressures less than 160.      Bigeminy and trigeminy:  -She denies any chest pain or shortness of breath.    -We will maintain her hemoglobin greater than 8.    -She denies any prior history of  "ischemic coronary artery disease.      DVT (deep venous thrombosis) prophylaxis:  -PCD     CODE STATUS:    -Full.      Disposition:  -pending clinical course.  -may need several days                 Patient was transferred from ICU to station 33 today  Transfer orders written.  Time spent >35 minutes  Discussed care with patient and his RN         Physical Exam:      Heart Rate: 87, Blood pressure 140/62, pulse 88, temperature 99  F (37.2  C), temperature source Oral, resp. rate 18, height 1.626 m (5' 4\"), weight 54.8 kg (120 lb 13 oz), SpO2 97 %, not currently breastfeeding.  Vitals:    09/02/17 1330 09/02/17 1740   Weight: 56.7 kg (125 lb) 54.8 kg (120 lb 13 oz)     Vital Signs with Ranges  Temp:  [97.6  F (36.4  C)-99  F (37.2  C)] 99  F (37.2  C)  Pulse:  [88] 88  Heart Rate:  [77-96] 87  Resp:  [7-22] 18  BP: (110-144)/(53-86) 140/62  SpO2:  [97 %-100 %] 97 %  I/O's Last 24 hours  I/O last 3 completed shifts:  In: 1643.34 [P.O.:20; I.V.:1623.34]  Out: 2305 [Urine:2305]    Constitutional: Alert awake oriented     Lungs: fair air entry on both sides of lungs     Cardiovascular: S1 and S2 no S3   And positive systolic murmur   Abdomen: Soft and bowel sounds very sluggish   Skin:    Other:           Medications:          sodium chloride (PF)  10 mL Intracatheter Q8H     ertapenem (INVanz) IV  1 g Intravenous Q24H     HYDROmorphone  0.2-0.4 mg Intravenous PCA     flecainide  100 mg Oral BID     levothyroxine  50 mcg Oral Daily     PRN Meds: lidocaine (buffered or not buffered), lidocaine 4%, sodium chloride (PF), lidocaine (buffered or not buffered), promethazine, acetaminophen, naloxone, metoprolol         Data:      All new lab and imaging data was reviewed.   Recent Labs   Lab Test  09/04/17   0600  09/03/17   1825  09/03/17   1508   09/02/17   1500 05/16/17 09/28/16   1016 10/15/15   09/30/15   1027   WBC   --    --    --    --   7.9   --   8.5   --    --   6.6   HGB  8.7*  10.3*  9.5*   < >  11.0*   --   12.4  "  --    --   12.1   MCV   --    --    --    --   87   --   91   --    --   90   PLT   --    --    --    --   142*   --   241   --    --   256   INR   --    --    --    --   1.01  2.9*   --   2.2*   < >   --     < > = values in this interval not displayed.      Recent Labs   Lab Test  09/04/17   0600  09/02/17   1500  09/28/16   1016   NA  143  141  141   POTASSIUM  3.9  4.2  4.0   CHLORIDE  114*  112*  105   CO2  17*  17*  25   BUN  6*  11  27   CR  0.43*  0.45*  0.61   ANIONGAP  12  12  11.4   ANKITA  7.5*  7.0*  9.2   GLC  175*  86  115*     Recent Labs   Lab Test  09/03/17   0020  09/02/17   1900  09/02/17   1500   TROPI  0.038  0.049*  0.051*

## 2017-09-04 NOTE — PLAN OF CARE
Problem: Goal Outcome Summary  Goal: Goal Outcome Summary  Patient AAOx4. R/A  Patient complained of nausea, unresolved by anti-emetic. Once patient used PCA/dilaudid pump, and pain was controlled, nausea was controlled as well.   Vital signs WNL.  D5% changed to NS as blood sugar at 0400 was 184.   HGB 8.3 at AM labs.   Report given to oncoming nurse. Will continue to monitor

## 2017-09-05 PROBLEM — D62 ANEMIA DUE TO BLOOD LOSS, ACUTE: Status: ACTIVE | Noted: 2017-09-05

## 2017-09-05 PROBLEM — I10 BENIGN ESSENTIAL HYPERTENSION: Chronic | Status: ACTIVE | Noted: 2017-05-12

## 2017-09-05 PROBLEM — E78.2 MIXED HYPERLIPIDEMIA: Chronic | Status: ACTIVE | Noted: 2017-05-12

## 2017-09-05 LAB
HGB BLD-MCNC: 7.5 G/DL (ref 11.7–15.7)
HGB BLD-MCNC: 7.7 G/DL (ref 11.7–15.7)

## 2017-09-05 PROCEDURE — 12000007 ZZH R&B INTERMEDIATE

## 2017-09-05 PROCEDURE — 40000257 ZZH STATISTIC CONSULT NO CHARGE VASC ACCESS

## 2017-09-05 PROCEDURE — 25000132 ZZH RX MED GY IP 250 OP 250 PS 637: Mod: GY | Performed by: INTERNAL MEDICINE

## 2017-09-05 PROCEDURE — 99212 OFFICE O/P EST SF 10 MIN: CPT

## 2017-09-05 PROCEDURE — 85018 HEMOGLOBIN: CPT | Performed by: INTERNAL MEDICINE

## 2017-09-05 PROCEDURE — A9270 NON-COVERED ITEM OR SERVICE: HCPCS | Mod: GY | Performed by: INTERNAL MEDICINE

## 2017-09-05 PROCEDURE — 25000128 H RX IP 250 OP 636

## 2017-09-05 PROCEDURE — 25000128 H RX IP 250 OP 636: Performed by: INTERNAL MEDICINE

## 2017-09-05 PROCEDURE — 99233 SBSQ HOSP IP/OBS HIGH 50: CPT | Performed by: INTERNAL MEDICINE

## 2017-09-05 PROCEDURE — 40000239 ZZH STATISTIC VAT ROUNDS

## 2017-09-05 RX ORDER — ATORVASTATIN CALCIUM 10 MG/1
10 TABLET, FILM COATED ORAL DAILY
Status: DISCONTINUED | OUTPATIENT
Start: 2017-09-05 | End: 2017-09-09 | Stop reason: HOSPADM

## 2017-09-05 RX ORDER — ZINC SULFATE 50(220)MG
220 CAPSULE ORAL DAILY
Status: DISCONTINUED | OUTPATIENT
Start: 2017-09-05 | End: 2017-09-09 | Stop reason: HOSPADM

## 2017-09-05 RX ORDER — ASCORBIC ACID 500 MG
500 TABLET ORAL DAILY
Status: DISCONTINUED | OUTPATIENT
Start: 2017-09-05 | End: 2017-09-09 | Stop reason: HOSPADM

## 2017-09-05 RX ORDER — FENOFIBRATE 160 MG/1
160 TABLET ORAL DAILY
Status: DISCONTINUED | OUTPATIENT
Start: 2017-09-05 | End: 2017-09-09 | Stop reason: HOSPADM

## 2017-09-05 RX ORDER — MULTIPLE VITAMINS W/ MINERALS TAB 9MG-400MCG
1 TAB ORAL DAILY
Status: DISCONTINUED | OUTPATIENT
Start: 2017-09-05 | End: 2017-09-09 | Stop reason: HOSPADM

## 2017-09-05 RX ADMIN — ATORVASTATIN CALCIUM 10 MG: 10 TABLET, FILM COATED ORAL at 11:06

## 2017-09-05 RX ADMIN — FLECAINIDE ACETATE 100 MG: 100 TABLET ORAL at 08:30

## 2017-09-05 RX ADMIN — LEVOTHYROXINE SODIUM 50 MCG: 50 TABLET ORAL at 08:30

## 2017-09-05 RX ADMIN — FLECAINIDE ACETATE 100 MG: 100 TABLET ORAL at 20:07

## 2017-09-05 RX ADMIN — Medication 10000 UNITS: at 20:07

## 2017-09-05 RX ADMIN — ERTAPENEM SODIUM 1 G: 1 INJECTION, POWDER, LYOPHILIZED, FOR SOLUTION INTRAMUSCULAR; INTRAVENOUS at 10:59

## 2017-09-05 RX ADMIN — MULTIPLE VITAMINS W/ MINERALS TAB 1 TABLET: TAB at 11:07

## 2017-09-05 RX ADMIN — SODIUM CHLORIDE: 9 INJECTION, SOLUTION INTRAVENOUS at 19:03

## 2017-09-05 RX ADMIN — FENOFIBRATE 160 MG: 160 TABLET ORAL at 11:06

## 2017-09-05 RX ADMIN — OXYCODONE HYDROCHLORIDE AND ACETAMINOPHEN 500 MG: 500 TABLET ORAL at 11:07

## 2017-09-05 RX ADMIN — ZINC SULFATE CAP 220 MG (50 MG ELEMENTAL ZN) 220 MG: 220 (50 ZN) CAP at 11:07

## 2017-09-05 NOTE — CONSULTS
"CLINICAL NUTRITION SERVICES  -  ASSESSMENT NOTE  RECOMMENDATIONS FOR MD/PROVIDER TO ORDER:   Beta carotene 10,000 International Units BID (PI stage 2)   Future/Additional Recommendations:   Monitor diet advancement and need for oral nutrition supplements to increase protein    Malnutrition: Patient does not meet two criteria necessary for diagnosing malnutrition        REASON FOR ASSESSMENT  Lola Coto is a 82 year old female seen by Registered Dietitian for Nutrition Monster <3      NUTRITION HISTORY  - Information obtained from pt  Pt states that prior to admission she was consuming meals TID. Types of foods pt consumed include soup, sandwiches and meat and potato type dishes.       CURRENT NUTRITION ORDERS  Diet Order:     NPO, since 9/3    Current Intake/Tolerance:  -Pt reports not having an appetite at this time. Pt states no recent wt loss   -IVF 75mL/hr  -Vitamin C 500mg, Zinc sulfate 220mg, Thera-Vit-M      PHYSICAL FINDINGS  Observed  No nutrition-related physical findings observed  Obtained from Chart/Interdisciplinary Team  - 9/5: Per WOC RN - Pressure Injury (PI) located on coccyx: small Stage 2    ANTHROPOMETRICS  Height: 5' 4\"  Weight: 119 lbs 14.88 oz (54.4kg)  Body mass index is 20.59 kg/(m^2).  Weight Status:  Normal BMI  IBW: 54.5 Kg  % IBW: 100%  Weight History: wt trending down over the last 4 months  Wt Readings from Last 10 Encounters:   09/05/17 54.4 kg (119 lb 14.9 oz)   05/16/17 55.3 kg (122 lb)   05/12/17 53.5 kg (118 lb)   09/28/16 56.7 kg (125 lb)   10/20/15 56.7 kg (125 lb)   09/30/15 55.8 kg (123 lb)   10/07/14 57.6 kg (127 lb)   05/23/14 55.8 kg (123 lb)   05/20/14 56 kg (123 lb 6.4 oz)   05/12/14 55.3 kg (122 lb)         LABS  Labs reviewed    MEDICATIONS  Medications reviewed    Dosing Weight 54.4 kg (actual wt from 9/5)    ASSESSED NUTRITION NEEDS PER APPROVED PRACTICE GUIDELINES:  Estimated Energy Needs: 1635+ kcals (30+ Kcal/Kg)  Justification: maintenance  Estimated Protein " Needs: 54-65 grams protein (1.3-1.5 g pro/Kg)  Justification: preservation of lean body mass  Estimated Fluid Needs: 1710-4373 mL (25-30 mL/kg)  Justification: maintenance    MALNUTRITION:  % Weight Loss:  Weight loss does not meet criteria for malnutrition   % Intake:  Decreased intake does not meet criteria for malnutrition NPO x 2 days  Subcutaneous Fat Loss:  None observed  Muscle Loss:  None observed  Fluid Retention:  None noted    Malnutrition Diagnosis: Patient does not meet two of the above criteria necessary for diagnosing malnutrition    NUTRITION DIAGNOSIS:  Inadequate oral intake related to current diet order, GI bleed, increased needs and overall medical status as evidenced by NPO x 2 days and stage 2 PI    Altered GI function    NUTRITION INTERVENTIONS  Recommendations / Nutrition Prescription  Advance diet as medically able   Continue Vitamin C, Zinc and multivitamin   Beta Carotene 10,000 International Units BID x 10 days     Implementation  Nutrition education: No education needs assessed at this time    Nutrition Goals  Diet to advance within in the next 48-72 hr    MONITORING AND EVALUATION:  Progress towards goals will be monitored and evaluated per protocol and Practice Guidelines      Esperanza Finnegan RD, LD

## 2017-09-05 NOTE — PROGRESS NOTES
[facility]    Hospitalist Progress Note    Date of Admission: 9/2/2017  Date of Service (when I saw the patient): 09/05/2017    Patient Summary:  Lola Coto is a 82 year old female with a history of hypertension, hyperlipidemia, hypothyroidism, and paroxysmal atrial fibrillation on warfarin who was admitted on 9/2/2017 with severe diverticular hemorrhage s/p right hemicolectomy on 9/3.    Assessment & Plan     GIB (gastrointestinal bleeding) due to diverticular hemorrhage  Anemia due to blood loss, acute  Large volume diverticular bleed at [place], requiring transfusion of 8 units prbc. Admitted to ICU initially. RBC scan positive for right colonic bleed. Mesenteric angiogram negative. Right hemicolectomy done on 9/3/17. Transferred out of ICU on 9/4. Currently awaiting return of bowel function postop.  - trend hgb daily (currently stable around 8 expected postop)  - no clinical sign of bleeding  - surgery following postop  - currently still NPO per surgery  - IV ertapenem per surgery (prophylaxis; no clear infectious indications)  - on IV dilaudid PCA for pain control (I will increase the bolus)  - will check ferritin levels; may need iron supplementation  - transfuse prn hgb < 7    Paroxysmal atrial fibrillation (H)  Long-term (current) use of anticoagulants [Z79.01]  S/p ablation. On flecainide and warfarin. Rhythm is a mix of bigeminy and trigeminy. Electrolytes ok. Echo 1/23/17 with LVEF 55-60% and mild valvular abnormalities, atrial dilation.  - warfarin held for now; resume when ok with surgery  - continue flecainide  - follow Mg and K    Hypothyroidism  TSH 9/28/16: 3.21.  - levothyroxine 50 mcg daily    Benign essential hypertension  PTA: amlodipine 10 mg daily, benazepril 40 mg daily  Meds on hold postop and BP ok for now  - may need to resume some agents cautiously for tomorrow    DM2  PTA: metformin 500/1000 mg  a1c 6.0 on 5/12/17  Glucose overall at goal 140-180 during  hospitalization  - recheck a1c  - oral meds on hold for now    Mixed hyperlipidemia  - continue atorvastatin 10 and fenofibrate 145      FEN: NPO, IVF  GI Prophylaxis: no (lower GI bleed)  Isolation: no  Central Lines: no  Smith: no  Restraints: no  DVT Prophylaxis: Pneumatic Compression Devices  Code Status: Full Code    Disposition: Expected discharge after adequately recovered postop.    Adi Cifuentes MD    [phone] (P)  Text Page (7am to 6pm)    Interval History   Nursing notes reviewed. Overnight night events noted. Patient doing ok. No fevers, chest pain, shortness of breath, nausea, or vomiting. Still has some incisional pain. No bleeding anywhere that she can tell. No return of bowel function or flatus yet.      -Data reviewed today: I reviewed all new labs and imaging results over the last 24 hours. I personally reviewed no images or EKG's today.    Physical Exam   Temp: 99  F (37.2  C) Temp src: Oral BP: 149/73 Pulse: 92 Heart Rate: 96 Resp: 16 SpO2: 95 % O2 Device: None (Room air)    Vitals:    09/02/17 1330 09/02/17 1740 09/05/17 0618   Weight: 56.7 kg (125 lb) 54.8 kg (120 lb 13 oz) 54.4 kg (119 lb 14.9 oz)     Vital Signs with Ranges  Temp:  [98.4  F (36.9  C)-99.1  F (37.3  C)] 99  F (37.2  C)  Pulse:  [88-92] 92  Heart Rate:  [] 96  Resp:  [9-22] 16  BP: (123-149)/(55-81) 149/73  SpO2:  [95 %-97 %] 95 %  I/O last 3 completed shifts:  In: 2445 [P.O.:20; I.V.:2425]  Out: 465 [Urine:465]    Constitutional: awake, alert, no acute distress.  Head: Normocephalic, atraumatic  Eye: Anicteric, noninjected. Extraocular movements normal.  ENT: External ear normal. Neck soft and range of motion normal.  Cardiovascular: Normal rate. Regularly irregular rhythm. Normal heart sounds. No rubs or gallops. 1/6 holosystolic murmur at LLSB and apex.  Respiratory: Clear to auscultation bilaterally. No wheezing, rhonchi, or crackles.  Gastrointestinal: Soft. Laparotomy incision covered with dressing.  Nondistended. Tender diffusely. No guarding or rebound. Hypoactive bowel sounds.  MSK: Strength grossly normal bilaterally and able to sit up without assistance.  Peripheral: No pedal edema bilaterally.  Skin: No rash. Stage 1 pressure ulcer on coccyx covered with mepilex  Neuro: Speech normal. Following commands. Moving all extremities spontaneously. Grossly intact.    Medications     NaCl 100 mL/hr at 09/04/17 1729     Another Antibiotic has been ordered. Stopped (09/03/17 1604)       sodium chloride (PF)  10 mL Intracatheter Q8H     ertapenem (INVanz) IV  1 g Intravenous Q24H     HYDROmorphone  0.2-0.4 mg Intravenous PCA     flecainide  100 mg Oral BID     levothyroxine  50 mcg Oral Daily       Data     Recent Labs  Lab 09/04/17  1839 09/04/17  0600 09/03/17  1825  09/03/17  0020 09/02/17  1900 09/02/17  1500   WBC  --   --   --   --   --   --  7.9   HGB 7.8* 8.7* 10.3*  < > 8.9* 10.1* 11.0*   MCV  --   --   --   --   --   --  87   PLT  --   --   --   --   --   --  142*   INR  --   --   --   --   --   --  1.01   NA  --  143  --   --   --   --  141   POTASSIUM  --  3.9  --   --   --   --  4.2   CHLORIDE  --  114*  --   --   --   --  112*   CO2  --  17*  --   --   --   --  17*   BUN  --  6*  --   --   --   --  11   CR  --  0.43*  --   --   --   --  0.45*   ANIONGAP  --  12  --   --   --   --  12   ANKITA  --  7.5*  --   --   --   --  7.0*   GLC  --  175*  --   --   --   --  86   TROPI  --   --   --   --  0.038 0.049* 0.051*   < > = values in this interval not displayed.    Imaging:  No results found for this or any previous visit (from the past 24 hour(s)).

## 2017-09-05 NOTE — PLAN OF CARE
Problem: Goal Outcome Summary  Goal: Goal Outcome Summary  Outcome: No Change  VSS, LS clear.  BS hypo, no flatus, denies nausea.  Awaiting am hgb.  Voided x1 overnight, up Ax1.  PCA for pain. Assist with frequent repo.

## 2017-09-05 NOTE — PLAN OF CARE
Problem: Goal Outcome Summary  Goal: Goal Outcome Summary  Outcome: Improving  A&O. VSS on RA. C/o 4-8/10 pain, managed w/ PCA; needs encouragement to utilize PCA. C/o some minimal nausea after ambulating; declined intervention and resolved w/ time. BS hypoactive; denies flatus. Ambulating w/ Ax1 in hallway. Adequate UO. [place] area to L buttock w/ surrounding blanchable erythema; mepilex in place. T/R. Midline IV positional; IV team redressed w/ some improvement. NPO w/ ice chips, meds.

## 2017-09-05 NOTE — PROGRESS NOTES
[facility] Nurse Inpatient Adult Pressure Injury (PI) Assessment     Initial Assessment of PI(s) on pt's:   Coccyx    Data:   Patient History:      per MD note(s): Lola Coto is a 82 year old female with a history of hypertension, hyperlipidemia, hypothyroidism, and paroxysmal atrial fibrillation on warfarin who was admitted on 2017 with severe diverticular hemorrhage s/p right hemicolectomy on 9/3.     Current mattress:  Pulsate low air loss mattress  Current pressure relieving devices:  Foam dressing and Pillows    Moisture Management:  Incontinence Protocol prn, toileting    Catheter secured? Not applicable    Current Diet / Nutrition:       Active Diet Order      NPO for Medical/Clinical Reasons Except for: Ice Chips, Meds      Monster Assessment and sub scores:   Monster Score  Av  Min: 15  Max: 21     Labs:   Recent Labs   Lab Test  17   0845   17   1500   17   1047  16   1016   ALBUMIN   --    --    --    --    --   4.1   HGB  7.7*   < >  11.0*   --    --   12.4   INR   --    --   1.01   < >   --    --    WBC   --    --   7.9   --    --   8.5   A1C   --    --    --    --   6.0  6.1*    < > = values in this interval not displayed.                                                                                                                          Pressure Injury Assessment  (location):   Coccyx     Wound History:   Pt has been lying in bed most of last week+ due to hospitalization, surgery, now difficult to reposition herself due to post-op abdominal pain    Wound Base: skin mostly intact but slightly wrinkly/puckery on left coccyx, like a superficial blister about 1.5cm, and epidermis starting to peel away.  Otherwise coccyx has some tan-pink erythema and is slow to yue, tissue is not very mobile and area is firm and bony.      Periwound Skin: intact    Color: pink    Temperature  normal     Drainage:  none         Odor: none    Pain:  minimal  "and moderate tenderness         Intervention:     Patient's chart evaluated.      Monster Interventions:  Current Monster Interventions and Care Plan reviewed and updated, appropriate at this time.    Coccyx assessed, Mepilex rolled back into place, pt repositioned on left side    Orders  Written    Supplies  Reviewed    Discussed plan of care with Patient, Nurse and Physician Dr. Cifuentes who was in room at assessment           Assessment:     Pressure Injury (PI) located on coccyx: small Stage 2, community acquired - nursing had documented evidence of pressure injury within 24hrs of admission.  Pt thin and bony and at risk for further breakdown, as she still seems hesitant to move/turn due to pain.  Pt already on air mattress and has Mepilex in place.  Reviewed PIP with pt who seemed agreeable and understanding.          Plan:     Nursing to notify the Provider(s) and re-consult the [facility] Nurse if wound(s) deteriorate(s)or if the wound care plan needs reevaluation.    If pt is refusing to turn or reposition they must be educated on the  potential injury from not off loading pressure.  Then this \"educated refusal\" needs to be documented as an \"educated refusal to turn/ reposition\" and document if alert, etc.      Plan for wound care to coccyx:   - assess BID, peeling back Mepilex to fully visualize  - document skin/wound findings in chart, notify WOC if concerns  - cover with Mepilex, change every other day and prn  - reposition every 1-2 hrs side to side, on back only for meals  - when in chair, use pillow or cushion (#[id number]) under pt and pt should stand up every hour    - Pulsate air mattress - NO FITTED SHEETS, only flat sheet or green lift sheet and Covidien pad      WO Nurse will return: toward end of week  Face to face time: 16-30 Minutes    "

## 2017-09-05 NOTE — PROGRESS NOTES
General Surgery Progress Note    S:  Ms Coto states she is doing ok but is very worried as someone told her she would need surgery again. She is not passing any gas but denies nausea. Her abdominal pain is moderate.     O:  Physical exam  General: Adult woman in NAD  B/P: 149/73, T: 99, P: 92, R: 16  CV: Extremities WWP. RRR  Resp: Breathing comfortably on RA  Abd: Mildly distended, soft but mildly tender. Incision is c/d/i.    Labs - Reviewed; hgb 7.8 to 7.7 overnight    A/P:  Ms Coto is an 82 year old female now POD2 from right hemicolectomy for likely diverticular bleeding. Though her hgb did drift down, unlikely that this is secondary to ongoing GI bleeding. Would much more likely suspect hemodilution and anemia of critical illness. If she was still having a significant GI bleed, she would be continuing to having bloody bowel movements of which she has had none. Also, next steps in management would include repeating imaging to further delineate a new bleeding location before repeat surgery would be performed.    - Continue hgb q12h checks  - Leave incision open to air  - NPO until ROBF   - Encourage ambulation TID and OOB whenever possible    Jia Nava MD  Surgery Resident, PGY4  Pager: [phone]

## 2017-09-05 NOTE — PLAN OF CARE
Problem: Goal Outcome Summary  Goal: Goal Outcome Summary  Outcome: No Change  VSS, LS coarse w/ wheezes. Disoriented overnight to place and situation. Productive cough, satting well on RA.  Mild pain, Tylenol last april.  Rested between cares.  BM x2 overnight, voiding well.  Up ax1-2 w/ walker.

## 2017-09-06 LAB
ANION GAP SERPL CALCULATED.3IONS-SCNC: 13 MMOL/L (ref 3–14)
BUN SERPL-MCNC: 4 MG/DL (ref 7–30)
CALCIUM SERPL-MCNC: 7.7 MG/DL (ref 8.5–10.1)
CHLORIDE SERPL-SCNC: 106 MMOL/L (ref 94–109)
CO2 SERPL-SCNC: 20 MMOL/L (ref 20–32)
COPATH REPORT: NORMAL
CREAT SERPL-MCNC: 0.35 MG/DL (ref 0.52–1.04)
ERYTHROCYTE [DISTWIDTH] IN BLOOD BY AUTOMATED COUNT: 15.8 % (ref 10–15)
FERRITIN SERPL-MCNC: 174 NG/ML (ref 8–252)
FOLATE SERPL-MCNC: >20 NG/ML
GFR SERPL CREATININE-BSD FRML MDRD: >90 ML/MIN/1.7M2
GLUCOSE SERPL-MCNC: 83 MG/DL (ref 70–99)
HBA1C MFR BLD: 5.3 % (ref 4.3–6)
HCT VFR BLD AUTO: 23.2 % (ref 35–47)
HGB BLD-MCNC: 7.9 G/DL (ref 11.7–15.7)
IRON SATN MFR SERPL: 10 % (ref 15–46)
IRON SERPL-MCNC: 15 UG/DL (ref 35–180)
MAGNESIUM SERPL-MCNC: 1.5 MG/DL (ref 1.6–2.3)
MCH RBC QN AUTO: 30 PG (ref 26.5–33)
MCHC RBC AUTO-ENTMCNC: 34.1 G/DL (ref 31.5–36.5)
MCV RBC AUTO: 88 FL (ref 78–100)
PLATELET # BLD AUTO: 198 10E9/L (ref 150–450)
POTASSIUM SERPL-SCNC: 3.2 MMOL/L (ref 3.4–5.3)
POTASSIUM SERPL-SCNC: 3.7 MMOL/L (ref 3.4–5.3)
RBC # BLD AUTO: 2.63 10E12/L (ref 3.8–5.2)
SODIUM SERPL-SCNC: 139 MMOL/L (ref 133–144)
TIBC SERPL-MCNC: 154 UG/DL (ref 240–430)
TSH SERPL DL<=0.005 MIU/L-ACNC: 1.23 MU/L (ref 0.4–4)
VIT B12 SERPL-MCNC: 1324 PG/ML (ref 193–986)
WBC # BLD AUTO: 9.2 10E9/L (ref 4–11)

## 2017-09-06 PROCEDURE — A9270 NON-COVERED ITEM OR SERVICE: HCPCS | Mod: GY | Performed by: SURGERY

## 2017-09-06 PROCEDURE — 40000239 ZZH STATISTIC VAT ROUNDS

## 2017-09-06 PROCEDURE — 99233 SBSQ HOSP IP/OBS HIGH 50: CPT | Performed by: INTERNAL MEDICINE

## 2017-09-06 PROCEDURE — 25000128 H RX IP 250 OP 636: Performed by: INTERNAL MEDICINE

## 2017-09-06 PROCEDURE — 80048 BASIC METABOLIC PNL TOTAL CA: CPT | Performed by: INTERNAL MEDICINE

## 2017-09-06 PROCEDURE — 82746 ASSAY OF FOLIC ACID SERUM: CPT | Performed by: INTERNAL MEDICINE

## 2017-09-06 PROCEDURE — A9270 NON-COVERED ITEM OR SERVICE: HCPCS | Mod: GY | Performed by: INTERNAL MEDICINE

## 2017-09-06 PROCEDURE — 84443 ASSAY THYROID STIM HORMONE: CPT | Performed by: INTERNAL MEDICINE

## 2017-09-06 PROCEDURE — 82728 ASSAY OF FERRITIN: CPT | Performed by: INTERNAL MEDICINE

## 2017-09-06 PROCEDURE — 83735 ASSAY OF MAGNESIUM: CPT | Performed by: INTERNAL MEDICINE

## 2017-09-06 PROCEDURE — 85027 COMPLETE CBC AUTOMATED: CPT | Performed by: INTERNAL MEDICINE

## 2017-09-06 PROCEDURE — 40000257 ZZH STATISTIC CONSULT NO CHARGE VASC ACCESS

## 2017-09-06 PROCEDURE — 82607 VITAMIN B-12: CPT | Performed by: INTERNAL MEDICINE

## 2017-09-06 PROCEDURE — 12000007 ZZH R&B INTERMEDIATE

## 2017-09-06 PROCEDURE — 83036 HEMOGLOBIN GLYCOSYLATED A1C: CPT | Performed by: INTERNAL MEDICINE

## 2017-09-06 PROCEDURE — 25000132 ZZH RX MED GY IP 250 OP 250 PS 637: Mod: GY | Performed by: INTERNAL MEDICINE

## 2017-09-06 PROCEDURE — 84132 ASSAY OF SERUM POTASSIUM: CPT | Performed by: INTERNAL MEDICINE

## 2017-09-06 PROCEDURE — 83550 IRON BINDING TEST: CPT | Performed by: INTERNAL MEDICINE

## 2017-09-06 PROCEDURE — 25000132 ZZH RX MED GY IP 250 OP 250 PS 637: Mod: GY | Performed by: SURGERY

## 2017-09-06 PROCEDURE — 99207 ZZC CDG-MDM COMPONENT: MEETS MODERATE - UP CODED: CPT | Performed by: INTERNAL MEDICINE

## 2017-09-06 PROCEDURE — 83540 ASSAY OF IRON: CPT | Performed by: INTERNAL MEDICINE

## 2017-09-06 RX ORDER — POTASSIUM CHLORIDE 1500 MG/1
20 TABLET, EXTENDED RELEASE ORAL ONCE
Status: COMPLETED | OUTPATIENT
Start: 2017-09-06 | End: 2017-09-06

## 2017-09-06 RX ORDER — POTASSIUM CHLORIDE 1500 MG/1
40 TABLET, EXTENDED RELEASE ORAL ONCE
Status: COMPLETED | OUTPATIENT
Start: 2017-09-06 | End: 2017-09-06

## 2017-09-06 RX ORDER — BISACODYL 10 MG
10 SUPPOSITORY, RECTAL RECTAL DAILY
Status: DISCONTINUED | OUTPATIENT
Start: 2017-09-06 | End: 2017-09-09 | Stop reason: HOSPADM

## 2017-09-06 RX ORDER — LEVOTHYROXINE SODIUM 50 UG/1
50 TABLET ORAL
Status: DISCONTINUED | OUTPATIENT
Start: 2017-09-07 | End: 2017-09-09 | Stop reason: HOSPADM

## 2017-09-06 RX ORDER — MAGNESIUM SULFATE HEPTAHYDRATE 40 MG/ML
4 INJECTION, SOLUTION INTRAVENOUS ONCE
Status: COMPLETED | OUTPATIENT
Start: 2017-09-06 | End: 2017-09-06

## 2017-09-06 RX ADMIN — Medication 10000 UNITS: at 21:05

## 2017-09-06 RX ADMIN — BISACODYL 10 MG: 10 SUPPOSITORY RECTAL at 16:35

## 2017-09-06 RX ADMIN — MAGNESIUM SULFATE IN WATER 4 G: 40 INJECTION, SOLUTION INTRAVENOUS at 08:54

## 2017-09-06 RX ADMIN — MULTIPLE VITAMINS W/ MINERALS TAB 1 TABLET: TAB at 08:54

## 2017-09-06 RX ADMIN — POTASSIUM CHLORIDE 40 MEQ: 1500 TABLET, EXTENDED RELEASE ORAL at 08:54

## 2017-09-06 RX ADMIN — SODIUM CHLORIDE: 9 INJECTION, SOLUTION INTRAVENOUS at 16:40

## 2017-09-06 RX ADMIN — LEVOTHYROXINE SODIUM 50 MCG: 50 TABLET ORAL at 08:54

## 2017-09-06 RX ADMIN — FLECAINIDE ACETATE 100 MG: 100 TABLET ORAL at 08:54

## 2017-09-06 RX ADMIN — POTASSIUM CHLORIDE 20 MEQ: 1500 TABLET, EXTENDED RELEASE ORAL at 16:35

## 2017-09-06 RX ADMIN — FLECAINIDE ACETATE 100 MG: 100 TABLET ORAL at 21:05

## 2017-09-06 RX ADMIN — Medication 10000 UNITS: at 08:54

## 2017-09-06 RX ADMIN — ATORVASTATIN CALCIUM 10 MG: 10 TABLET, FILM COATED ORAL at 08:54

## 2017-09-06 RX ADMIN — FENOFIBRATE 160 MG: 160 TABLET ORAL at 08:54

## 2017-09-06 RX ADMIN — ZINC SULFATE CAP 220 MG (50 MG ELEMENTAL ZN) 220 MG: 220 (50 ZN) CAP at 08:54

## 2017-09-06 RX ADMIN — OXYCODONE HYDROCHLORIDE AND ACETAMINOPHEN 500 MG: 500 TABLET ORAL at 08:53

## 2017-09-06 NOTE — PROGRESS NOTES
General Surgery Progress Note    S:  Ms Coto reports that she is feeling very frustrated with how slow things are going and also uncomfortable from all the swelling.     O:  Physical exam  General: Adult woman in NAD  B/P: 145/65, T: 98.6, P: 88, R: 18  CV: Extremities WWP. RRR  Resp: Breathing comfortably on RA  Abd: Mildly distended but soft. Nontender. steristrips in place.   Extremities: Right hand edematous    Labs - Reviewed     A/P:  Ms Coto is an 82 year old female now POD3 from right hemicolectomy for likely diverticular bleeding who is awaiting return of bowel function. Her hgb is now stabilized. She is not nauseated.     - Encourage ambulation  - Optomize electrolytes  - Continue NPO diet until passing gas  - Could just check hgb daily at this point  - Leave incision open to air    Jia Nava MD  Surgery Resident, PGY4  Pager: [phone]    Agree with above.  Try suppository today.  Switch PICC to other arm.  Sukumar Caraballo MD  General Surgery, Office [phone]

## 2017-09-06 NOTE — PROGRESS NOTES
[facility]    Hospitalist Progress Note    Date of Admission: 9/2/2017  Date of Service (when I saw the patient): 09/06/2017    Patient Summary:  Lola Coto is a 82 year old female with a history of hypertension, hyperlipidemia, hypothyroidism, and paroxysmal atrial fibrillation on warfarin who was admitted on 9/2/2017 with severe diverticular hemorrhage s/p right hemicolectomy on 9/3.    Assessment & Plan     GIB (gastrointestinal bleeding) due to diverticular hemorrhage  Anemia due to blood loss, acute  Large volume diverticular bleed at [place], requiring transfusion of 8 units prbc. Admitted to ICU initially. RBC scan positive for right colonic bleed. Mesenteric angiogram negative. Right hemicolectomy done on 9/3/17. Transferred out of ICU on 9/4. Currently awaiting return of bowel function postop.  - trend hgb daily (currently stable postop)  - does not need iron supplementation  - no clinical sign of bleeding  - surgery following postop  - currently still NPO per surgery  - off ertapenem now  - on IV dilaudid PCA for pain control which is working well for her  - transfuse prn hgb < 7    Paroxysmal atrial fibrillation (H)  Long-term (current) use of anticoagulants [Z79.01]  S/p ablation. On flecainide and warfarin. Rhythm is a mix of bigeminy and trigeminy. Electrolytes ok. Echo 1/23/17 with LVEF 55-60% and mild valvular abnormalities, atrial dilation.  - warfarin held for now; resume when ok with surgery  - continue flecainide  - follow Mg and K and replace as needed (manual; no protocol)    Hypothyroidism  TSH 9/28/16: 3.21.  - levothyroxine 50 mcg daily    Benign essential hypertension  PTA: amlodipine 10 mg daily, benazepril 40 mg daily  Meds on hold postop and BP ok for now  - currently there is not the need to resume home BP meds    DM2  PTA: metformin 500/1000 mg  a1c 6.0 on 5/12/17. 5.3 on 9/6/17.  Glucose overall at goal 140-180 during hospitalization  - oral meds on hold for  now    Mixed hyperlipidemia  - continue atorvastatin 10 and fenofibrate 145    Right hand swelling  Right foot swelling  Suspect RUE from IVF. Can relocate IV.  I do not see clinical RLE edema. She can elevate legs if needed. No pain. I doubt DVT.      FEN: NPO, IVF  GI Prophylaxis: no (lower GI bleed)  Isolation: no  Central Lines: no  Smith: no  Restraints: no  DVT Prophylaxis: Pneumatic Compression Devices  Code Status: Full Code    Disposition: Expected discharge after adequately recovered postop.    Adi Cifuentes MD    [phone] (P)  Text Page (7am to 6pm)    Interval History   Nursing notes reviewed. Overnight night events noted. Has incisional abdominal pain. No nausea. No flatus yet. Is discouraged. No clinical bleeding. Complains of right hand and right foot swelling.      -Data reviewed today: I reviewed all new labs and imaging results over the last 24 hours. I personally reviewed no images or EKG's today.    Physical Exam   Temp: 98.8  F (37.1  C) Temp src: Oral BP: 135/68 Pulse: 88 Heart Rate: 88 Resp: 18 SpO2: 97 % O2 Device: None (Room air)    Vitals:    09/02/17 1740 09/05/17 0618 09/06/17 0559   Weight: 54.8 kg (120 lb 13 oz) 54.4 kg (119 lb 14.9 oz) 57.4 kg (126 lb 8.7 oz)     Vital Signs with Ranges  Temp:  [98.6  F (37  C)-98.9  F (37.2  C)] 98.8  F (37.1  C)  Pulse:  [88] 88  Heart Rate:  [88-95] 88  Resp:  [16-18] 18  BP: (125-145)/(65-72) 135/68  SpO2:  [95 %-97 %] 97 %  I/O last 3 completed shifts:  In: 3162 [P.O.:300; I.V.:2862]  Out: 1800 [Urine:1800]    Constitutional: awake, alert, no acute distress.  Head: Normocephalic, atraumatic  Eye: Anicteric, noninjected. Extraocular movements normal.  ENT: External ear normal. Neck soft and range of motion normal.  Cardiovascular: Normal rate. Regularly irregular rhythm. Normal heart sounds. No rubs or gallops. 1/6 holosystolic murmur at LLSB and apex.  Respiratory: Clear to auscultation bilaterally. No wheezing, rhonchi, or  crackles.  Gastrointestinal: Soft. Laparotomy incision with steristrips. No drainage or erythema. Nondistended. Tender diffusely. No guarding or rebound. Hypoactive bowel sounds.  MSK: Strength grossly normal bilaterally and able to sit up without assistance.  Peripheral: No pedal edema bilaterally.  Skin: No rash. Stage 1 pressure ulcer on coccyx covered with mepilex  Neuro: Speech normal. Following commands. Moving all extremities spontaneously. Grossly intact.    Medications     NaCl 75 mL/hr at 09/05/17 1903       potassium chloride  20 mEq Oral Once     fenofibrate  160 mg Oral Daily     multivitamin, therapeutic with minerals  1 tablet Oral Daily     atorvastatin  10 mg Oral Daily     HYDROmorphone  0.2-0.4 mg Intravenous PCA     ascorbic acid  500 mg Oral Daily     zinc sulfate  220 mg Oral Daily     beta carotene  10,000 Units Oral BID     sodium chloride (PF)  10 mL Intracatheter Q8H     flecainide  100 mg Oral BID     levothyroxine  50 mcg Oral Daily       Data     Recent Labs  Lab 09/06/17  0600 09/05/17 2025 09/05/17  0845  09/04/17  0600 09/03/17  0020 09/02/17  1900 09/02/17  1500   WBC 9.2  --   --   --   --   --   --   --  7.9   HGB 7.9* 7.5* 7.7*  < > 8.7*  < > 8.9* 10.1* 11.0*   MCV 88  --   --   --   --   --   --   --  87     --   --   --   --   --   --   --  142*   INR  --   --   --   --   --   --   --   --  1.01     --   --   --  143  --   --   --  141   POTASSIUM 3.2*  --   --   --  3.9  --   --   --  4.2   CHLORIDE 106  --   --   --  114*  --   --   --  112*   CO2 20  --   --   --  17*  --   --   --  17*   BUN 4*  --   --   --  6*  --   --   --  11   CR 0.35*  --   --   --  0.43*  --   --   --  0.45*   ANIONGAP 13  --   --   --  12  --   --   --  12   ANKITA 7.7*  --   --   --  7.5*  --   --   --  7.0*   GLC 83  --   --   --  175*  --   --   --  86   TROPI  --   --   --   --   --   --  0.038 0.049* 0.051*   < > = values in this interval not displayed.    Imaging:  No results found  for this or any previous visit (from the past 24 hour(s)).

## 2017-09-06 NOTE — PLAN OF CARE
Problem: Goal Outcome Summary  Goal: Goal Outcome Summary  Outcome: Improving  A&O X4. Drg on abd CDI. Pain is being managed with PCA, pt's pain improved this evening. A-1 with gb. Pt states nausea is intermittent but improved this evening. BS hypoactive X4, pt denies passing gas. VSS on RA.

## 2017-09-06 NOTE — PLAN OF CARE
Problem: Goal Outcome Summary  Goal: Goal Outcome Summary  Outcome: No Change  A/O. AVSS. Up with SBA. PCA adequate for pain. UOP ok. BS hypo- Passing flatus occasionally. NPO with Ice. ML catheter dressing changed for constant positional occlusion- seems to be fixed now. Right arm swollen below ML, IV RN assessed and said it is ok. Mg and K replaced. ML incision NACHO, WDL.

## 2017-09-07 LAB
ANION GAP SERPL CALCULATED.3IONS-SCNC: 13 MMOL/L (ref 3–14)
BUN SERPL-MCNC: 5 MG/DL (ref 7–30)
CALCIUM SERPL-MCNC: 7.7 MG/DL (ref 8.5–10.1)
CHLORIDE SERPL-SCNC: 108 MMOL/L (ref 94–109)
CO2 SERPL-SCNC: 20 MMOL/L (ref 20–32)
CREAT SERPL-MCNC: 0.38 MG/DL (ref 0.52–1.04)
GFR SERPL CREATININE-BSD FRML MDRD: >90 ML/MIN/1.7M2
GLUCOSE SERPL-MCNC: 75 MG/DL (ref 70–99)
HGB BLD-MCNC: 6.7 G/DL (ref 11.7–15.7)
HGB BLD-MCNC: 7 G/DL (ref 11.7–15.7)
HGB BLD-MCNC: 7.2 G/DL (ref 11.7–15.7)
INTERPRETATION ECG - MUSE: NORMAL
MAGNESIUM SERPL-MCNC: 1.8 MG/DL (ref 1.6–2.3)
POTASSIUM SERPL-SCNC: 3.8 MMOL/L (ref 3.4–5.3)
SODIUM SERPL-SCNC: 141 MMOL/L (ref 133–144)

## 2017-09-07 PROCEDURE — 36415 COLL VENOUS BLD VENIPUNCTURE: CPT | Performed by: INTERNAL MEDICINE

## 2017-09-07 PROCEDURE — A9270 NON-COVERED ITEM OR SERVICE: HCPCS | Mod: GY | Performed by: INTERNAL MEDICINE

## 2017-09-07 PROCEDURE — 25000125 ZZHC RX 250: Performed by: INTERNAL MEDICINE

## 2017-09-07 PROCEDURE — 80048 BASIC METABOLIC PNL TOTAL CA: CPT | Performed by: INTERNAL MEDICINE

## 2017-09-07 PROCEDURE — 83735 ASSAY OF MAGNESIUM: CPT | Performed by: INTERNAL MEDICINE

## 2017-09-07 PROCEDURE — 85018 HEMOGLOBIN: CPT | Performed by: INTERNAL MEDICINE

## 2017-09-07 PROCEDURE — 36415 COLL VENOUS BLD VENIPUNCTURE: CPT | Performed by: SURGERY

## 2017-09-07 PROCEDURE — 25000128 H RX IP 250 OP 636: Performed by: INTERNAL MEDICINE

## 2017-09-07 PROCEDURE — 85018 HEMOGLOBIN: CPT | Performed by: SURGERY

## 2017-09-07 PROCEDURE — 25000132 ZZH RX MED GY IP 250 OP 250 PS 637: Mod: GY

## 2017-09-07 PROCEDURE — 99232 SBSQ HOSP IP/OBS MODERATE 35: CPT | Performed by: INTERNAL MEDICINE

## 2017-09-07 PROCEDURE — 40000239 ZZH STATISTIC VAT ROUNDS

## 2017-09-07 PROCEDURE — 25000132 ZZH RX MED GY IP 250 OP 250 PS 637: Mod: GY | Performed by: INTERNAL MEDICINE

## 2017-09-07 PROCEDURE — A9270 NON-COVERED ITEM OR SERVICE: HCPCS | Mod: GY

## 2017-09-07 PROCEDURE — 12000007 ZZH R&B INTERMEDIATE

## 2017-09-07 RX ORDER — LISINOPRIL 10 MG/1
10 TABLET ORAL DAILY
Status: DISCONTINUED | OUTPATIENT
Start: 2017-09-07 | End: 2017-09-08

## 2017-09-07 RX ORDER — HYDROMORPHONE HYDROCHLORIDE 1 MG/ML
.3-.5 INJECTION, SOLUTION INTRAMUSCULAR; INTRAVENOUS; SUBCUTANEOUS
Status: DISCONTINUED | OUTPATIENT
Start: 2017-09-07 | End: 2017-09-09 | Stop reason: HOSPADM

## 2017-09-07 RX ORDER — POTASSIUM CHLORIDE 1500 MG/1
20 TABLET, EXTENDED RELEASE ORAL ONCE
Status: COMPLETED | OUTPATIENT
Start: 2017-09-07 | End: 2017-09-07

## 2017-09-07 RX ORDER — BENAZEPRIL HYDROCHLORIDE 10 MG/1
10 TABLET ORAL DAILY
Status: DISCONTINUED | OUTPATIENT
Start: 2017-09-07 | End: 2017-09-07 | Stop reason: CLARIF

## 2017-09-07 RX ORDER — OXYCODONE HYDROCHLORIDE 5 MG/1
5-10 TABLET ORAL EVERY 4 HOURS PRN
Status: DISCONTINUED | OUTPATIENT
Start: 2017-09-07 | End: 2017-09-09 | Stop reason: HOSPADM

## 2017-09-07 RX ADMIN — FENOFIBRATE 160 MG: 160 TABLET ORAL at 08:32

## 2017-09-07 RX ADMIN — OXYCODONE HYDROCHLORIDE AND ACETAMINOPHEN 500 MG: 500 TABLET ORAL at 08:32

## 2017-09-07 RX ADMIN — LISINOPRIL 10 MG: 10 TABLET ORAL at 12:58

## 2017-09-07 RX ADMIN — MULTIPLE VITAMINS W/ MINERALS TAB 1 TABLET: TAB at 08:32

## 2017-09-07 RX ADMIN — Medication 2 G: at 12:58

## 2017-09-07 RX ADMIN — FLECAINIDE ACETATE 100 MG: 100 TABLET ORAL at 08:32

## 2017-09-07 RX ADMIN — POTASSIUM CHLORIDE 20 MEQ: 1500 TABLET, EXTENDED RELEASE ORAL at 12:58

## 2017-09-07 RX ADMIN — ATORVASTATIN CALCIUM 10 MG: 10 TABLET, FILM COATED ORAL at 08:32

## 2017-09-07 RX ADMIN — Medication 10000 UNITS: at 20:19

## 2017-09-07 RX ADMIN — Medication 10000 UNITS: at 08:32

## 2017-09-07 RX ADMIN — SODIUM CHLORIDE: 9 INJECTION, SOLUTION INTRAVENOUS at 06:14

## 2017-09-07 RX ADMIN — LEVOTHYROXINE SODIUM 50 MCG: 50 TABLET ORAL at 08:32

## 2017-09-07 RX ADMIN — FLECAINIDE ACETATE 100 MG: 100 TABLET ORAL at 20:19

## 2017-09-07 RX ADMIN — ZINC SULFATE CAP 220 MG (50 MG ELEMENTAL ZN) 220 MG: 220 (50 ZN) CAP at 08:32

## 2017-09-07 RX ADMIN — SODIUM CHLORIDE: 9 INJECTION, SOLUTION INTRAVENOUS at 20:20

## 2017-09-07 NOTE — PLAN OF CARE
Problem: Goal Outcome Summary  Goal: Goal Outcome Summary  Outcome: Improving  VSS. A/O. SBA. Ambulated. abdo incision WDL. Denies pain, PCA cont. Had a loose bloody stool after a suppository. Denies N/V. NPO. Coccyx mepilex changed. R arm edema.

## 2017-09-07 NOTE — PLAN OF CARE
Problem: Goal Outcome Summary  Goal: Goal Outcome Summary  Outcome: Improving  Pt A&Ox4. [place], frustrated but accepting. Denies pain- PCA pump DCd today.  VSS, AF. Mag and K+ replaced. HgB recheck 7.2- no intervention today, monitoring.  LSCTAB. IS use encouraged.   BS+, faint. Moderate sized, maroon BM today. Pt declined suppository. Advanced to clear liquids and tolerated well.  Voiding in bathroom, moves with Ax1. Incision CDI, steri stripped.   Coccyx pink, blanchable; Mepilex in place, and encouraged and assisted pt to turn in bed q2 hours minimum. Pt on specialty air mattress.  Pt denies any concerns, questions, or outstanding needs.

## 2017-09-07 NOTE — PLAN OF CARE
Problem: Goal Outcome Summary  Goal: Goal Outcome Summary  Outcome: No Change  VSS. PCA available, pt denies pain. Midline incision intact. BM smears during shift. Mepilex intact on coccyx, blanchable erythema. Hgb monitored daily. Up with SBA. NPO with ice and meds. Denies nausea. Voiding adequately.

## 2017-09-07 NOTE — PROVIDER NOTIFICATION
General surgery on call paged regarding critical lab result Hemoglobin 6.7. Patient vitally stable, asymptomatic. No new orders received, will await primary surgery to see patient before deciding for blood administration. Will continue to monitor.

## 2017-09-07 NOTE — PROGRESS NOTES
General Surgery Progress Note    S:  Ms Coto reports that she feels like she is making progress. She had a BM yesterday that was fairly dark but then had some stool colored smears after that. She is passing gas. She is not nauseated.     O:  Physical exam  General: Adult woman in NAD  B/P: 136/69, T: 98.3, P: 83, R: 16  CV: Extremities WWP. RRR  Resp: Breathing comfortably on RA  Abd: Soft, mildly distended. Nontender.     Labs - Reviewed - hgb 6.7 this morning    A/P:  Ms Coto is an 82 year old female now POD4 from right hemicolectomy for likely cecal diverticular bleeding who is improving. Her hgb this morning does not fit with her overall clinical picture. Additional, the RN reported it was drawn from an IV and was very difficult to draw. Will recheck now before giving any blood products or changing plans.      - Pain: Transition from PCA to oral meds today  - CV: HDS  - Resp: Breathing comfortably on RA  - Diet: Will likely start clears pending hgb results  - : voiding  - Proph: Will start subq heparin today pending hgb result    Jia Nava MD  Surgery Resident, PGY4  Pager: [phone]    Agree with above.  Patient appears to be doing better, has return of bowel function.  I would like to make sure she continues to have normal bowel function prior to discharge, she was quite constipated at time of surgery.  Sukumar Caraballo M.D.  [facility]  [phone]

## 2017-09-07 NOTE — PROGRESS NOTES
CLINICAL NUTRITION SERVICES - REASSESSMENT NOTE      Recommendations Ordered by Registered Dietitian (RD):   Ensure Clear between meals       EVALUATION OF PROGRESS TOWARD GOALS   Diet:  Starting clear liquids  Intake:  Pt had some broth and jello today, tolerated well. She is looking forward to more solid food.      Previous Goals (9/5):   Diet to advance within in the next 48-72 hr  Evaluation: Met    Previous Nutrition Diagnosis:   Inadequate oral intake related to current diet order, GI bleed, increased needs and overall medical status as evidenced by NPO x 2 days and stage 2 PI  Evaluation: Improving      CURRENT NUTRITION DIAGNOSIS  Inadequate oral intake related to altered GI function as evidenced by 5th day NPO/clear liquids only    INTERVENTIONS  Recommendations / Nutrition Prescription  ADAT  Nutritional supplements    Implementation  Medical Food Supplement    Goals  Pt will advance to sold foods 2-3 days      MONITORING AND EVALUATION:  Progress towards goals will be monitored and evaluated per protocol and Practice Guidelines    Maren Rahman RD  Pager [phone] (M-F)            [phone] ([facility] Hol)

## 2017-09-07 NOTE — PROGRESS NOTES
[facility]    Hospitalist Progress Note    Date of Admission: 9/2/2017  Date of Service (when I saw the patient): 09/07/2017    Patient Summary:  Lola Coto is a 82 year old female with a history of hypertension, hyperlipidemia, hypothyroidism, and paroxysmal atrial fibrillation on warfarin who was admitted on 9/2/2017 with severe diverticular hemorrhage s/p right hemicolectomy on 9/3.    Assessment & Plan     GIB (gastrointestinal bleeding) due to diverticular hemorrhage  Anemia due to blood loss, acute  Large volume diverticular bleed at [place], requiring transfusion of 8 units prbc. Admitted to ICU initially. RBC scan positive for right colonic bleed. Mesenteric angiogram negative. Right hemicolectomy done on 9/3/17. Transferred out of ICU on 9/4. Recovering slowly.  - trend hgb daily (slightly low this AM but improved on recheck)  - does not need iron supplementation (ferritin ok)  - no clinical sign of bleeding  - surgery following postop; pain control per surgery  - advanced to clears  - transitioned to oral pain control from PCA    Paroxysmal atrial fibrillation (H)  Long-term (current) use of anticoagulants [Z79.01]  S/p ablation. On flecainide and warfarin. Rhythm is a mix of bigeminy and trigeminy. Electrolytes ok. Echo 1/23/17 with LVEF 55-60% and mild valvular abnormalities, atrial dilation.  - warfarin held for now; resume when ok with surgery  - continue flecainide  - follow Mg and K and replace as needed (manual; no protocol)    Hypothyroidism  TSH 9/28/16: 3.21.  - levothyroxine 50 mcg daily    Benign essential hypertension  PTA: amlodipine 10 mg daily, benazepril 40 mg daily  Meds on hold postop and being cautiously resumed as BP improves  - resume benazepril at lower dose (10 mg daily for now)    DM2  PTA: metformin 500/1000 mg  a1c 6.0 on 5/12/17. 5.3 on 9/6/17.  Glucose overall at goal 140-180 during hospitalization  - oral meds on hold for now    Mixed  hyperlipidemia  - continue atorvastatin 10 and fenofibrate 145      FEN: clears, IVF  GI Prophylaxis: no (lower GI bleed)  Isolation: no  Central Lines: no  Smith: no  Restraints: no  DVT Prophylaxis: Pneumatic Compression Devices  Code Status: Full Code    Disposition: Expected discharge after adequately recovered postop.    Adi Cifuentes MD    [phone] (P)  Text Page (7am to 6pm)    Interval History   Nursing notes reviewed. Overnight night events noted. Passed some gas. Does not like being in the hospital for so long. Started on clears by surgery. Some small bits of bright red blood in stools but otherwise doing well. Pain is well controlled.      -Data reviewed today: I reviewed all new labs and imaging results over the last 24 hours. I personally reviewed no images or EKG's today.    Physical Exam   Temp: 98.2  F (36.8  C) Temp src: Oral BP: 144/69 Pulse: 83 Heart Rate: 94 Resp: 16 SpO2: 99 % O2 Device: None (Room air)    Vitals:    09/05/17 0618 09/06/17 0559 09/07/17 0554   Weight: 54.4 kg (119 lb 14.9 oz) 57.4 kg (126 lb 8.7 oz) 56.9 kg (125 lb 7.1 oz)     Vital Signs with Ranges  Temp:  [98.2  F (36.8  C)-98.6  F (37  C)] 98.2  F (36.8  C)  Pulse:  [83-90] 83  Heart Rate:  [83-94] 94  Resp:  [16-18] 16  BP: (132-147)/(64-74) 144/69  SpO2:  [96 %-99 %] 99 %  I/O last 3 completed shifts:  In: 1721.75 [P.O.:30; I.V.:1691.75]  Out: 610 [Urine:610]    Constitutional: awake, alert, no acute distress.  Head: Normocephalic, atraumatic  Eye: Anicteric, noninjected. Extraocular movements normal.  ENT: External ear normal. Neck soft and range of motion normal.  Cardiovascular: Normal rate. Regularly irregular rhythm. Normal heart sounds. No rubs or gallops. 1/6 holosystolic murmur at LLSB and apex.  Respiratory: Clear to auscultation bilaterally. No wheezing, rhonchi, or crackles.  Gastrointestinal: Soft. Laparotomy incision with steristrips. No drainage or erythema. Nondistended. Tender diffusely. No guarding  or rebound. Hypoactive bowel sounds.  MSK: Strength grossly normal bilaterally and able to sit up without assistance.  Peripheral: No pedal edema bilaterally.  Skin: No rash. Stage 1 pressure ulcer on coccyx covered with mepilex  Neuro: Speech normal. Following commands. Moving all extremities spontaneously. Grossly intact.      Medications     NaCl 75 mL/hr at 09/07/17 0840       levothyroxine  50 mcg Oral QAM AC     bisacodyl  10 mg Rectal Daily     fenofibrate  160 mg Oral Daily     multivitamin, therapeutic with minerals  1 tablet Oral Daily     atorvastatin  10 mg Oral Daily     ascorbic acid  500 mg Oral Daily     zinc sulfate  220 mg Oral Daily     beta carotene  10,000 Units Oral BID     sodium chloride (PF)  10 mL Intracatheter Q8H     flecainide  100 mg Oral BID       Data     Recent Labs  Lab 09/07/17  0806 09/07/17  0605 09/06/17  1855 09/06/17  0600  09/04/17  0600  09/03/17  0020 09/02/17  1900 09/02/17  1500   WBC  --   --   --  9.2  --   --   --   --   --  7.9   HGB 7.2* 6.7*  --  7.9*  < > 8.7*  < > 8.9* 10.1* 11.0*   MCV  --   --   --  88  --   --   --   --   --  87   PLT  --   --   --  198  --   --   --   --   --  142*   INR  --   --   --   --   --   --   --   --   --  1.01   NA  --  141  --  139  --  143  --   --   --  141   POTASSIUM  --  3.8 3.7 3.2*  --  3.9  --   --   --  4.2   CHLORIDE  --  108  --  106  --  114*  --   --   --  112*   CO2  --  20  --  20  --  17*  --   --   --  17*   BUN  --  5*  --  4*  --  6*  --   --   --  11   CR  --  0.38*  --  0.35*  --  0.43*  --   --   --  0.45*   ANIONGAP  --  13  --  13  --  12  --   --   --  12   ANKITA  --  7.7*  --  7.7*  --  7.5*  --   --   --  7.0*   GLC  --  75  --  83  --  175*  --   --   --  86   TROPI  --   --   --   --   --   --   --  0.038 0.049* 0.051*   < > = values in this interval not displayed.    Imaging:  No results found for this or any previous visit (from the past 24 hour(s)).

## 2017-09-08 LAB — HGB BLD-MCNC: 7.6 G/DL (ref 11.7–15.7)

## 2017-09-08 PROCEDURE — 25000132 ZZH RX MED GY IP 250 OP 250 PS 637: Mod: GY | Performed by: INTERNAL MEDICINE

## 2017-09-08 PROCEDURE — 99232 SBSQ HOSP IP/OBS MODERATE 35: CPT | Performed by: INTERNAL MEDICINE

## 2017-09-08 PROCEDURE — 99211 OFF/OP EST MAY X REQ PHY/QHP: CPT

## 2017-09-08 PROCEDURE — 36415 COLL VENOUS BLD VENIPUNCTURE: CPT | Performed by: SURGERY

## 2017-09-08 PROCEDURE — A9270 NON-COVERED ITEM OR SERVICE: HCPCS | Mod: GY

## 2017-09-08 PROCEDURE — A9270 NON-COVERED ITEM OR SERVICE: HCPCS | Mod: GY | Performed by: INTERNAL MEDICINE

## 2017-09-08 PROCEDURE — 12000007 ZZH R&B INTERMEDIATE

## 2017-09-08 PROCEDURE — 25000132 ZZH RX MED GY IP 250 OP 250 PS 637: Mod: GY

## 2017-09-08 PROCEDURE — 40000239 ZZH STATISTIC VAT ROUNDS

## 2017-09-08 PROCEDURE — 85018 HEMOGLOBIN: CPT | Performed by: SURGERY

## 2017-09-08 RX ORDER — LISINOPRIL 20 MG/1
20 TABLET ORAL DAILY
Status: DISCONTINUED | OUTPATIENT
Start: 2017-09-09 | End: 2017-09-09 | Stop reason: HOSPADM

## 2017-09-08 RX ORDER — AMLODIPINE BESYLATE 5 MG/1
5 TABLET ORAL DAILY
Status: DISCONTINUED | OUTPATIENT
Start: 2017-09-08 | End: 2017-09-09 | Stop reason: HOSPADM

## 2017-09-08 RX ADMIN — ZINC SULFATE CAP 220 MG (50 MG ELEMENTAL ZN) 220 MG: 220 (50 ZN) CAP at 08:28

## 2017-09-08 RX ADMIN — FLECAINIDE ACETATE 100 MG: 100 TABLET ORAL at 08:28

## 2017-09-08 RX ADMIN — FLECAINIDE ACETATE 100 MG: 100 TABLET ORAL at 21:46

## 2017-09-08 RX ADMIN — OXYCODONE HYDROCHLORIDE AND ACETAMINOPHEN 500 MG: 500 TABLET ORAL at 08:28

## 2017-09-08 RX ADMIN — LEVOTHYROXINE SODIUM 50 MCG: 50 TABLET ORAL at 07:04

## 2017-09-08 RX ADMIN — LISINOPRIL 10 MG: 10 TABLET ORAL at 08:28

## 2017-09-08 RX ADMIN — ATORVASTATIN CALCIUM 10 MG: 10 TABLET, FILM COATED ORAL at 08:28

## 2017-09-08 RX ADMIN — Medication 10000 UNITS: at 08:28

## 2017-09-08 RX ADMIN — Medication 10000 UNITS: at 21:46

## 2017-09-08 RX ADMIN — FENOFIBRATE 160 MG: 160 TABLET ORAL at 08:28

## 2017-09-08 RX ADMIN — MULTIPLE VITAMINS W/ MINERALS TAB 1 TABLET: TAB at 08:28

## 2017-09-08 RX ADMIN — AMLODIPINE BESYLATE 5 MG: 5 TABLET ORAL at 13:44

## 2017-09-08 RX ADMIN — POLYETHYLENE GLYCOL-3350 AND ELECTROLYTES 4000 ML: 236; 6.74; 5.86; 2.97; 22.74 POWDER, FOR SOLUTION ORAL at 09:40

## 2017-09-08 NOTE — PROGRESS NOTES
General Surgery Progress Note    S:  Ms oCto is feeling well and tolerated her clear liquid diet without issue. She continues to pass gas.     O:  Physical exam  General: Adult woman in NAD  B/P: 150/75, T: 98.5, P: 85, R: 18  CV: Extremities WWP. RRR  Resp: Breathing comfortably on RA  Abd: Mildly distended    Labs - hgb 7    A/P:  Ms Coto is an 82 year old female now POD5 from right hemicolectomy from diverticular bleeding who is doing well.     - Ok to with transfusion if desired by hospitalist  - Advance diet as tolerated  - GoLytely (to sip, not drink quickly) to help move bowels and clear the diverticula of bloody residue  - Likely ready to d/c tomorrow or Sunday    Jia Nava MD  Surgery Resident, PGY4  Pager: [phone]

## 2017-09-08 NOTE — PLAN OF CARE
Problem: Goal Outcome Summary  Goal: Goal Outcome Summary  Outcome: No Change  VSS. Denies pain. Midline incision CDI. Tolerating clears. No BM during shift, passing gas. Up SBA. Ambulated x2.

## 2017-09-08 NOTE — PROGRESS NOTES
SPIRITUAL HEALTH SERVICES Progress Note  [facility]    D: Pt shared that she attends [employer] when at her [place] residence and a [employer] when at her residence in [place], [place]. Pt's , Harjinder, was present and shared about their long lasting marriage. Pt. Reported that she plans to be discharged soon and does not need  to contact  and declined  services.     A: Pt seemed eager to return home and appeared to have support system.    P: No plans to follow.    Loreta Eric   Intern

## 2017-09-08 NOTE — PROGRESS NOTES
[facility]    Hospitalist Progress Note    Date of Admission: 9/2/2017  Date of Service (when I saw the patient): 09/08/2017    Patient Summary:  Lola Coto is a 82 year old female with a history of hypertension, hyperlipidemia, hypothyroidism, and paroxysmal atrial fibrillation on warfarin who was admitted on 9/2/2017 with severe diverticular hemorrhage s/p right hemicolectomy on 9/3.    Assessment & Plan     GIB (gastrointestinal bleeding) due to diverticular hemorrhage  Anemia due to blood loss, acute  Large volume diverticular bleed at [place], requiring transfusion of 8 units prbc. Admitted to ICU initially. RBC scan positive for right colonic bleed. Mesenteric angiogram negative. Right hemicolectomy done on 9/3/17. Transferred out of ICU on 9/4. Recovering slowly.  - trend hgb daily. Currently right around 7. I will stop IVF to remove any dilutional contributors and if she is even lower tomorrow we can order a transfusion  - does not need iron supplementation (ferritin ok)  - no clinical sign of bleeding  - surgery following postop; pain control per surgery  - advanced to clears (surgery plans golytely to cleanse residual blood from colon and to stimulate a BM)  - on oral pain control with good results    Paroxysmal atrial fibrillation (H)  Long-term (current) use of anticoagulants [Z79.01]  S/p ablation. On flecainide and warfarin. Rhythm is a mix of bigeminy and trigeminy. Electrolytes ok. Echo 1/23/17 with LVEF 55-60% and mild valvular abnormalities, atrial dilation.  - warfarin held for now; resume when ok with surgery (patient is fearful of resuming long term so she can discuss this with PCP or cardiology as outpatient)  - continue flecainide  - follow Mg and K and replace as needed (manual; not on protocol)    Hypothyroidism  TSH 9/28/16: 3.21.  - levothyroxine 50 mcg daily    Benign essential hypertension  PTA: amlodipine 10 mg daily, benazepril 40 mg daily  Meds on hold postop  and being cautiously resumed as BP improves  - will increase benazepril to 20 mg daily today  - add back amlodipine at 5 mg    DM2  PTA: metformin 500/1000 mg  a1c 6.0 on 5/12/17. 5.3 on 9/6/17.  Glucose overall at goal 140-180 during hospitalization  - oral meds on hold for now    Mixed hyperlipidemia  - continue atorvastatin 10 and fenofibrate 145      FEN: clears  GI Prophylaxis: no (lower GI bleed)  Isolation: no  Central Lines: no  Smith: no  Restraints: no  DVT Prophylaxis: Pneumatic Compression Devices  Code Status: Full Code    Disposition: Expected discharge after adequately recovered postop, per surgery.    Adi Cifuentes MD    [phone] (P)  Text Page (7am to 6pm)    Interval History   Nursing notes reviewed. Overnight night events noted. Passing gas. Tolerating clears. Abdominal pain is better but she has flares when she eats.      -Data reviewed today: I reviewed all new labs and imaging results over the last 24 hours. I personally reviewed no images or EKG's today.    Physical Exam   Temp: 98.6  F (37  C) Temp src: Oral BP: 146/74 Pulse: 80 Heart Rate: 85 Resp: 16 SpO2: 98 % O2 Device: None (Room air)    Vitals:    09/06/17 0559 09/07/17 0554 09/08/17 0500   Weight: 57.4 kg (126 lb 8.7 oz) 56.9 kg (125 lb 7.1 oz) 55.8 kg (123 lb 0.3 oz)     Vital Signs with Ranges  Temp:  [97.7  F (36.5  C)-99.1  F (37.3  C)] 98.6  F (37  C)  Pulse:  [80-91] 80  Heart Rate:  [85] 85  Resp:  [16-18] 16  BP: (140-150)/(67-77) 146/74  SpO2:  [96 %-99 %] 98 %  I/O last 3 completed shifts:  In: 180 [P.O.:180]  Out: 850 [Urine:850]    Constitutional: awake, alert, no acute distress.  Head: Normocephalic, atraumatic  Eye: Anicteric, noninjected. Extraocular movements normal.  ENT: External ear normal. Neck soft and range of motion normal.  Cardiovascular: Normal rate. Regularly irregular rhythm. Normal heart sounds. No rubs or gallops. 1/6 holosystolic murmur at LLSB and apex.  Respiratory: Clear to auscultation  bilaterally. No wheezing, rhonchi, or crackles.  Gastrointestinal: Soft. Laparotomy incision with steristrips. Some serous dranage onto strips. No surrounding erythema. Nondistended. Tender diffusely. No guarding or rebound. Hypoactive bowel sounds.  MSK: Strength grossly normal bilaterally and able to sit up without assistance.  Peripheral: No pedal edema bilaterally.  Skin: No rash. Stage 1 pressure ulcer on coccyx covered with mepilex  Neuro: Speech normal. Following commands. Moving all extremities spontaneously. Grossly intact.      Medications        lisinopril  10 mg Oral Daily     levothyroxine  50 mcg Oral QAM AC     bisacodyl  10 mg Rectal Daily     fenofibrate  160 mg Oral Daily     multivitamin, therapeutic with minerals  1 tablet Oral Daily     atorvastatin  10 mg Oral Daily     ascorbic acid  500 mg Oral Daily     zinc sulfate  220 mg Oral Daily     beta carotene  10,000 Units Oral BID     sodium chloride (PF)  10 mL Intracatheter Q8H     flecainide  100 mg Oral BID       Data     Recent Labs  Lab 09/07/17  2103 09/07/17  0806 09/07/17  0605 09/06/17  1855 09/06/17  0600  09/04/17  0600  09/03/17  0020 09/02/17  1900 09/02/17  1500   WBC  --   --   --   --  9.2  --   --   --   --   --  7.9   HGB 7.0* 7.2* 6.7*  --  7.9*  < > 8.7*  < > 8.9* 10.1* 11.0*   MCV  --   --   --   --  88  --   --   --   --   --  87   PLT  --   --   --   --  198  --   --   --   --   --  142*   INR  --   --   --   --   --   --   --   --   --   --  1.01   NA  --   --  141  --  139  --  143  --   --   --  141   POTASSIUM  --   --  3.8 3.7 3.2*  --  3.9  --   --   --  4.2   CHLORIDE  --   --  108  --  106  --  114*  --   --   --  112*   CO2  --   --  20  --  20  --  17*  --   --   --  17*   BUN  --   --  5*  --  4*  --  6*  --   --   --  11   CR  --   --  0.38*  --  0.35*  --  0.43*  --   --   --  0.45*   ANIONGAP  --   --  13  --  13  --  12  --   --   --  12   ANKITA  --   --  7.7*  --  7.7*  --  7.5*  --   --   --  7.0*   GLC  --    --  75  --  83  --  175*  --   --   --  86   TROPI  --   --   --   --   --   --   --   --  0.038 0.049* 0.051*   < > = values in this interval not displayed.    Imaging:  No results found for this or any previous visit (from the past 24 hour(s)).

## 2017-09-08 NOTE — PLAN OF CARE
Problem: Goal Outcome Summary  Goal: Goal Outcome Summary  Outcome: Improving  Quiet night. No complain except would like to discharge. AVSS. + BS + flatus. Incision CDI. Up with SBA. Denied pain.

## 2017-09-08 NOTE — PLAN OF CARE
Problem: Goal Outcome Summary  Goal: Goal Outcome Summary  Outcome: Improving  A&O, VSS, [place], Frustrated- wants to go home. Lung sounds diminished in the LLL, RLL. Bowel sounds active, passing gas. Maroon loose stool x1. Golytely ordered. Having watery maroon colored stools. Adequate urine output. IS 1250. Abdomen is rounded, abdominal incision with steri strips NACHO- CDI. Ambulates the hallway with SBA. Tolerating regular diet. Denies pain. Mepilex border to coccyx. Specialty mattress. Turn and repo every 2 hours and PRN.

## 2017-09-08 NOTE — PROGRESS NOTES
[facility] Nurse Inpatient Adult Pressure Injury (PI) Assessment     Follow-up Assessment of PI(s) on pt's:   Coccyx    Data:   Patient History:      per MD note(s): Lola Coto is a 82 year old female with a history of hypertension, hyperlipidemia, hypothyroidism, and paroxysmal atrial fibrillation on warfarin who was admitted on 2017 with severe diverticular hemorrhage s/p right hemicolectomy on 9/3.     Current mattress:  Pulsate low air loss mattress  Current pressure relieving devices:  Foam dressing and Pillows    Moisture Management:  Incontinence Protocol prn, toileting    Catheter secured? Not applicable    Current Diet / Nutrition:       Active Diet Order      Advance Diet as Tolerated: Regular Diet Adult; Regular Diet Adult    Monster Assessment and sub scores:   Monster Score  Av.8  Min: 15  Max: 22      Labs:   Recent Labs   Lab Test  17   1145   17   0600   17   1500   16   1016   ALBUMIN   --    --    --    --    --    --   4.1   HGB  7.6*   < >  7.9*   < >  11.0*   --   12.4   INR   --    --    --    --   1.01   < >   --    WBC   --    --   9.2   --   7.9   --   8.5   A1C   --    --   5.3   --    --    < >  6.1*    < > = values in this interval not displayed.                                                                                 Pressure Injury Assessment  (location):   Coccyx     Wound History:   Pt has been lying in bed most of last week+ due to hospitalization, surgery; mobility and pain now improving but pt still noted lying flat on back/butt in bed    Wound Base: no open areas today but there are still a few pink puckery areas around coccyx, 1-2cm diameter, with tiny serosang scabbing.  General erythema improved.      Periwound Skin: intact    Color: pink and normal    Temperature  normal     Drainage:  none         Odor: none    Pain:  Minimal          Intervention:     Patient's chart evaluated.      Monster Interventions:   "Current Monster Interventions and Care Plan reviewed and updated, appropriate at this time.    Coccyx assessed, Mepilex rolled back into place, pt repositioned on left side    Orders  reviewed    Supplies  Reviewed    Discussed plan of care with Patient, Nurse            Assessment:     Pressure Injury (PI) located on coccyx: small Stage 2s, community acquired, improving slowly.  Pt thin and bony and at risk for further breakdown, as she still seems hesitant to lie on sides and skin is fragile and already compromised.  Pt already on air mattress and has Mepilex in place, and is much more mobile this week, getting out of bed independently and ambulating with only SBA.  Reviewed PIP with pt who seemed agreeable and understanding.           Plan:     Nursing to notify the Provider(s) and re-consult the [facility] Nurse if wound(s) deteriorate(s)or if the wound care plan needs reevaluation.    If pt is refusing to turn or reposition they must be educated on the  potential injury from not off loading pressure.  Then this \"educated refusal\" needs to be documented as an \"educated refusal to turn/ reposition\" and document if alert, etc.      Plan for wound care to coccyx:   - assess BID, peeling back Mepilex to fully visualize  - document skin/wound findings in chart, notify WOC if concerns  - cover with Mepilex, change every other day and prn  - reposition every 1-2 hrs side to side, on back only for meals  - when in chair, use pillow or cushion (#[id number]) under pt and pt should stand up every hour    - Pulsate air mattress - NO FITTED SHEETS, only flat sheet or green lift sheet and Covidien pad      WO Nurse will return: weekly and prn  Face to face time: 15 Minutes    "

## 2017-09-09 VITALS
OXYGEN SATURATION: 94 % | RESPIRATION RATE: 16 BRPM | SYSTOLIC BLOOD PRESSURE: 124 MMHG | BODY MASS INDEX: 21 KG/M2 | WEIGHT: 123.02 LBS | DIASTOLIC BLOOD PRESSURE: 71 MMHG | HEART RATE: 93 BPM | HEIGHT: 64 IN | TEMPERATURE: 98.5 F

## 2017-09-09 LAB
ANION GAP SERPL CALCULATED.3IONS-SCNC: 12 MMOL/L (ref 3–14)
BUN SERPL-MCNC: 4 MG/DL (ref 7–30)
CALCIUM SERPL-MCNC: 8.6 MG/DL (ref 8.5–10.1)
CHLORIDE SERPL-SCNC: 102 MMOL/L (ref 94–109)
CO2 SERPL-SCNC: 27 MMOL/L (ref 20–32)
CREAT SERPL-MCNC: 0.38 MG/DL (ref 0.52–1.04)
GFR SERPL CREATININE-BSD FRML MDRD: >90 ML/MIN/1.7M2
GLUCOSE SERPL-MCNC: 89 MG/DL (ref 70–99)
HGB BLD-MCNC: 7.7 G/DL (ref 11.7–15.7)
INR PPP: 0.94 (ref 0.86–1.14)
POTASSIUM SERPL-SCNC: 3.2 MMOL/L (ref 3.4–5.3)
SODIUM SERPL-SCNC: 141 MMOL/L (ref 133–144)

## 2017-09-09 PROCEDURE — A9270 NON-COVERED ITEM OR SERVICE: HCPCS | Mod: GY | Performed by: INTERNAL MEDICINE

## 2017-09-09 PROCEDURE — 25000132 ZZH RX MED GY IP 250 OP 250 PS 637: Mod: GY | Performed by: PHYSICIAN ASSISTANT

## 2017-09-09 PROCEDURE — 25000132 ZZH RX MED GY IP 250 OP 250 PS 637: Mod: GY | Performed by: INTERNAL MEDICINE

## 2017-09-09 PROCEDURE — 99239 HOSP IP/OBS DSCHRG MGMT >30: CPT | Performed by: INTERNAL MEDICINE

## 2017-09-09 PROCEDURE — 36415 COLL VENOUS BLD VENIPUNCTURE: CPT | Performed by: INTERNAL MEDICINE

## 2017-09-09 PROCEDURE — 85610 PROTHROMBIN TIME: CPT | Performed by: INTERNAL MEDICINE

## 2017-09-09 PROCEDURE — 80048 BASIC METABOLIC PNL TOTAL CA: CPT | Performed by: INTERNAL MEDICINE

## 2017-09-09 PROCEDURE — 85018 HEMOGLOBIN: CPT | Performed by: INTERNAL MEDICINE

## 2017-09-09 RX ORDER — FERROUS SULFATE 325(65) MG
325 TABLET ORAL DAILY
Qty: 100 TABLET | Refills: 1 | Status: SHIPPED | OUTPATIENT
Start: 2017-09-09 | End: 2017-09-09

## 2017-09-09 RX ORDER — FERROUS SULFATE 325(65) MG
325 TABLET ORAL
Qty: 30 TABLET | Refills: 2 | Status: SHIPPED | OUTPATIENT
Start: 2017-09-09 | End: 2017-10-10

## 2017-09-09 RX ORDER — OXYCODONE HYDROCHLORIDE 5 MG/1
5-10 TABLET ORAL EVERY 4 HOURS PRN
Qty: 20 TABLET | Refills: 0 | Status: SHIPPED | OUTPATIENT
Start: 2017-09-09 | End: 2017-09-29

## 2017-09-09 RX ORDER — POTASSIUM CL/LIDO/0.9 % NACL 10MEQ/0.1L
10 INTRAVENOUS SOLUTION, PIGGYBACK (ML) INTRAVENOUS
Status: DISCONTINUED | OUTPATIENT
Start: 2017-09-09 | End: 2017-09-09 | Stop reason: HOSPADM

## 2017-09-09 RX ORDER — POTASSIUM CHLORIDE 1500 MG/1
20-40 TABLET, EXTENDED RELEASE ORAL
Status: DISCONTINUED | OUTPATIENT
Start: 2017-09-09 | End: 2017-09-09 | Stop reason: HOSPADM

## 2017-09-09 RX ORDER — WARFARIN SODIUM 5 MG/1
5 TABLET ORAL
Status: DISCONTINUED | OUTPATIENT
Start: 2017-09-09 | End: 2017-09-09 | Stop reason: HOSPADM

## 2017-09-09 RX ORDER — FERROUS SULFATE 325(65) MG
325 TABLET ORAL DAILY
Status: DISCONTINUED | OUTPATIENT
Start: 2017-09-09 | End: 2017-09-09 | Stop reason: HOSPADM

## 2017-09-09 RX ORDER — POTASSIUM CHLORIDE 1.5 G/1.58G
20-40 POWDER, FOR SOLUTION ORAL
Status: DISCONTINUED | OUTPATIENT
Start: 2017-09-09 | End: 2017-09-09 | Stop reason: HOSPADM

## 2017-09-09 RX ORDER — POTASSIUM CHLORIDE 29.8 MG/ML
20 INJECTION INTRAVENOUS
Status: DISCONTINUED | OUTPATIENT
Start: 2017-09-09 | End: 2017-09-09 | Stop reason: HOSPADM

## 2017-09-09 RX ORDER — POTASSIUM CHLORIDE 7.45 MG/ML
10 INJECTION INTRAVENOUS
Status: DISCONTINUED | OUTPATIENT
Start: 2017-09-09 | End: 2017-09-09 | Stop reason: HOSPADM

## 2017-09-09 RX ADMIN — Medication 10000 UNITS: at 08:40

## 2017-09-09 RX ADMIN — OXYCODONE HYDROCHLORIDE AND ACETAMINOPHEN 500 MG: 500 TABLET ORAL at 08:41

## 2017-09-09 RX ADMIN — MULTIPLE VITAMINS W/ MINERALS TAB 1 TABLET: TAB at 08:41

## 2017-09-09 RX ADMIN — ATORVASTATIN CALCIUM 10 MG: 10 TABLET, FILM COATED ORAL at 08:41

## 2017-09-09 RX ADMIN — POTASSIUM CHLORIDE 40 MEQ: 1500 TABLET, EXTENDED RELEASE ORAL at 13:44

## 2017-09-09 RX ADMIN — FERROUS SULFATE TAB 325 MG (65 MG ELEMENTAL FE) 325 MG: 325 (65 FE) TAB at 08:40

## 2017-09-09 RX ADMIN — LISINOPRIL 20 MG: 20 TABLET ORAL at 08:40

## 2017-09-09 RX ADMIN — FLECAINIDE ACETATE 100 MG: 100 TABLET ORAL at 08:41

## 2017-09-09 RX ADMIN — FENOFIBRATE 160 MG: 160 TABLET ORAL at 08:41

## 2017-09-09 RX ADMIN — LEVOTHYROXINE SODIUM 50 MCG: 50 TABLET ORAL at 06:29

## 2017-09-09 RX ADMIN — AMLODIPINE BESYLATE 5 MG: 5 TABLET ORAL at 08:40

## 2017-09-09 RX ADMIN — ZINC SULFATE CAP 220 MG (50 MG ELEMENTAL ZN) 220 MG: 220 (50 ZN) CAP at 08:41

## 2017-09-09 NOTE — PLAN OF CARE
Problem: Goal Outcome Summary  Goal: Goal Outcome Summary  Outcome: No Change  VSS, [place]. Denies intervention for reports of no pain. Abdominal incision intact. BS active, denies flatus after administration of GoLytely on april shift. Denies nausea. 1 loose brown stool overnight. Lung sounds diminished in lower lobes. Voiding adequately. Ambulates independently in room. Tolerating clears.  Mepilex intact on coccyx. Specialty mattress intact. Likely d/c later today or on Sunday.

## 2017-09-09 NOTE — PLAN OF CARE
Problem: Goal Outcome Summary  Goal: Goal Outcome Summary  Outcome: Adequate for Discharge Date Met:  09/09/17  AVS printed and given to patient. Discharge instructions and new medication reviewed with patient. Patient refused to take Oxycodone script. All questions answered, patient verbalized understanding. All belongings and new medication sent with patient. Discharged off unit with volunteer, patient's  to provide ride home.

## 2017-09-09 NOTE — PLAN OF CARE
Problem: Goal Outcome Summary  Goal: Goal Outcome Summary     Patient is alert and oriented X 4. Lungs diminished. She is up in room to bathroom and to bedside commode with a stand by assistance. Golytely prep completed, with clear result, it was maroon color for a while. She denies pain. She is in bed resting at this time. She is encouraged to call for all help and assistance. Will cont to monitor.

## 2017-09-09 NOTE — PROGRESS NOTES
"General Surgery  Admission Date: 9/2/2017  Today's Date: 9/9/2017      SUBJECTIVE  Afebrile, VSS overnight. Wants to go home. Had several maroon stools after GoLytely, then clear and now has had a loose brown stool. Passing gas this morning. Tolerating regular diet without nausea or pain. Ambulating independently. Was not transfused yesterday; IV fluids turned off to rule out any dilutional component of anemia. Very hesitant about resuming her warfarin as this is apparently the third time she has had a GI bleed while.        OBJECTIVE  /70 (BP Location: Left arm)  Pulse 93  Temp 98.3  F (36.8  C) (Oral)  Resp 16  Ht 1.626 m (5' 4\")  Wt 55.8 kg (123 lb 0.3 oz)  LMP  (LMP Unknown)  SpO2 95%  BMI 21.12 kg/m2  I/O last 3 completed shifts:  In: 2086.25 [P.O.:1060; I.V.:1026.25]  Out: -   General: awake, alert, NAD, sitting up in bed  Respiratory: non-labored breathing  Abdomen: soft, mildly distended, nontender, + bowel sounds  Incision: midline abdominal incision CDI with steri strips in place, no erythema or drainage  Extremities: no edema, no calf tenderness    Labs Today  Hgb: 7.7 (7.6 yesterday)  BMP: pending      ASSESSMENT/PLAN  Ms Coto is an 82 year old female now POD6 from right hemicolectomy from diverticular bleeding who is doing well   - Hgb stable at 7.7   - good bowel function, bloody residue from colon seems to be cleared after GoLytely   - Low residue diet   - OK for discharge to home later today from surgical standpoint if cleared by hospitalist   - Recommend she resume her warfarin (4mg daily), patient agrees with this plan for now and will make appointment to follow-up with her cardiologist in 1-2 weeks   - PO iron at discharge          Ailyn Chao PA-C  Office: [phone]  Pager: [phone]   "

## 2017-09-09 NOTE — PROGRESS NOTES
[facility]    Hospitalist Progress Note    Assessment & Plan   Lola Coto is a 82 year old female who was admitted on 9/2/2017.     Lola Coto is a 82 year old female with a history of hypertension, hyperlipidemia, hypothyroidism, and paroxysmal atrial fibrillation on warfarin who was admitted on 9/2/2017 with severe diverticular hemorrhage s/p right hemicolectomy on 9/3.        Assessment & Plan        GIB (gastrointestinal bleeding) due to diverticular hemorrhage  Anemia due to blood loss, acute  Large volume diverticular bleed at [place], requiring transfusion of 8 units prbc. Admitted to ICU initially. RBC scan positive for right colonic bleed. Mesenteric angiogram negative. Right hemicolectomy done on 9/3/17. Transferred out of ICU on 9/4. Recovering slowly.  -  hgb stable,  In 7 range  -surgery recommends iron supplement, so will start on discharge  - no clinical sign of bleeding  - surgery wants to see back w/ Dr. Caraballo in about 2 weeks  - low residue diet until seen by Dr. Caraballo in 2 weeks  - on oral pain control with good results     Paroxysmal atrial fibrillation (H)  Long-term (current) use of anticoagulants [Z79.01]  S/p ablation. On flecainide and warfarin. Rhythm is a mix of bigeminy and trigeminy. Electrolytes ok. Echo 1/23/17 with LVEF 55-60% and mild valvular abnormalities, atrial dilation.  - warfarin can be restarted per surgery, will restart at 4 mg and recheck INR mid-week  - continue flecainide  -pt is going to follow-up with Dr. Bee and can discuss anticoagulation at that time    Early pressure sore noted by wound care nurse  -discussed with patient about varying her position when she is sitting or sleeping     Hypothyroidism  TSH 9/28/16: 3.21.  - levothyroxine 50 mcg daily     Benign essential hypertension  PTA: amlodipine 10 mg daily, benazepril 40 mg daily  Medswere  on hold postop and  werre  cautiously resumed as BP improved  -BP currently somewhat high,  last 156/87, so will resume home dose on discharge       DM2  PTA: metformin 500/1000 mg  a1c 6.0 on 5/12/17. 5.3 on 9/6/17.  Glucose overall at goal 140-180 during hospitalization  - oral meds to be restarted on discharge     Mixed hyperlipidemia  - continue atorvastatin 10 and fenofibrate 145        FEN: low fiber  GI Prophylaxis: no (lower GI bleed)  Isolation: no  Central Lines: no  Smith: no  Restraints: no  DVT Prophylaxis: Pneumatic Compression Devices  Code Status: Full Code     Disposition: discharge today           Merry Lange MD    Interval History   Nervous about restarting coumadin, but reviewed symptoms and signs of recurrent GI bleed.      -Data reviewed today: I reviewed all new labs and imaging results over the last 24 hours. I personally reviewed no images or EKG's today.    Physical Exam   Temp: 98.6  F (37  C) Temp src: Oral BP: 144/73 Pulse: 93 Heart Rate: 78 Resp: 16 SpO2: 99 % O2 Device: None (Room air)    Vitals:    09/06/17 0559 09/07/17 0554 09/08/17 0500   Weight: 57.4 kg (126 lb 8.7 oz) 56.9 kg (125 lb 7.1 oz) 55.8 kg (123 lb 0.3 oz)     Vital Signs with Ranges  Temp:  [97.3  F (36.3  C)-98.6  F (37  C)] 98.6  F (37  C)  Pulse:  [93] 93  Heart Rate:  [76-78] 78  Resp:  [16-19] 16  BP: (140-156)/(69-89) 144/73  SpO2:  [95 %-100 %] 99 %  I/O last 3 completed shifts:  In: 2086.25 [P.O.:1060; I.V.:1026.25]  Out: -     Constitutional: alert  Respiratory: clear to auscultation, no wheezing or rhonchi   Cardiovascular: regular rate and rhythm without murmer or rub   GI:positive bowel sounds, non-tender   Skin/Integumen: no rashes    Other:        Medications     Warfarin Therapy Reminder         ferrous sulfate  325 mg Oral Daily     warfarin  5 mg Oral ONCE at 18:00     lisinopril  20 mg Oral Daily     amLODIPine  5 mg Oral Daily     levothyroxine  50 mcg Oral QAM AC     bisacodyl  10 mg Rectal Daily     fenofibrate  160 mg Oral Daily     multivitamin, therapeutic with minerals  1  tablet Oral Daily     atorvastatin  10 mg Oral Daily     ascorbic acid  500 mg Oral Daily     zinc sulfate  220 mg Oral Daily     beta carotene  10,000 Units Oral BID     sodium chloride (PF)  10 mL Intracatheter Q8H     flecainide  100 mg Oral BID       Data     Recent Labs  Lab 09/09/17  0918 09/09/17  0712 09/08/17  1145 09/07/17  2103  09/07/17  0605 09/06/17  1855 09/06/17  0600  09/03/17  0020 09/02/17  1900 09/02/17  1500   WBC  --   --   --   --   --   --   --  9.2  --   --   --  7.9   HGB  --  7.7* 7.6* 7.0*  < > 6.7*  --  7.9*  < > 8.9* 10.1* 11.0*   MCV  --   --   --   --   --   --   --  88  --   --   --  87   PLT  --   --   --   --   --   --   --  198  --   --   --  142*   INR 0.94  --   --   --   --   --   --   --   --   --   --  1.01   NA  --  141  --   --   --  141  --  139  < >  --   --  141   POTASSIUM  --  3.2*  --   --   --  3.8 3.7 3.2*  < >  --   --  4.2   CHLORIDE  --  102  --   --   --  108  --  106  < >  --   --  112*   CO2  --  27  --   --   --  20  --  20  < >  --   --  17*   BUN  --  4*  --   --   --  5*  --  4*  < >  --   --  11   CR  --  0.38*  --   --   --  0.38*  --  0.35*  < >  --   --  0.45*   ANIONGAP  --  12  --   --   --  13  --  13  < >  --   --  12   ANKITA  --  8.6  --   --   --  7.7*  --  7.7*  < >  --   --  7.0*   GLC  --  89  --   --   --  75  --  83  < >  --   --  86   TROPI  --   --   --   --   --   --   --   --   --  0.038 0.049* 0.051*   < > = values in this interval not displayed.    No results found for this or any previous visit (from the past 24 hour(s)).

## 2017-09-09 NOTE — DISCHARGE SUMMARY
DATE OF ADMISSION:  09/02/2017.       DATE OF DISCHARGE:  09/09/2017.       DISCHARGE DIAGNOSES:   1.  Lower gastrointestinal bleed due to diverticular hemorrhage.   2.  Acute blood loss anemia.   3.  Right hemicolectomy 09/03/2017.   4.  History of paroxysmal atrial fibrillation.   5.  History of anticoagulation.   6.  History of hypothyroidism.   7.  History of benign essential hypertension.   8.  Type 2 diabetes.   9.  History of mixed hyperlipidemia.   10.  Coccyx pressure injury, stage II.      DISCHARGE MEDICATIONS:   1.  Ferrous sulfate 325 mg p.o. daily.   2.  Oxycodone 5-10 mg q.4 h. p.r.n. moderate to severe pain, 20 tablets given      MEDICATIONS WHICH HAVE NOT CHANGED:    1.  Amlodipine 10 mg p.o. daily.   2.  Atorvastatin 10 mg p.o. daily.   3.  Benazepril 40 mg p.o. daily.   4.  Calcium with vitamin D 600/200 one tablet daily.   5.  Centrum Silver 1 tablet daily.   6.  Estradiol patch transdermal q. week.   7.  Fenofibrate 145 mg every day orally.    8.  Flecainide 100 mg p.o. b.i.d.   9.  Levothyroxine 50 mcg p.o. daily.   10.  Metformin 500 mg p.o. 3 times daily with meals.   11.  Warfarin 4 mg daily.        The patient is to follow up with Dr. Caraballo's office in 1-2 weeks for first postoperative appointment.  The patient is to be on a low-fiber diet until she sees Dr. Caraballo again for rechecks.      DISCHARGE DIET:  Low-fiber diet.  She will follow up with an INR mid week.  Further INR and Coumadin dosing per [facility].  Follow up with Dr. Bee as an outpatient.  Follow up with primary care physician, Rodger Razo MD, within 7 days for hospital followup.  The following labs are recommended:  CBC, INR and potassium.      PERTINENT LABS AND IMAGING STUDIES:  Hemoglobin on the day of discharge 7.7, on 09/08 it was 7.6 and on 09/07 it was 7.2 and 7.0.  INR on the day of discharge 0.94.  The ferritin was 174.  A1c 5.3.  Folate greater than 20.  Iron level 15, iron binding capacity 154, percent  saturation 10.  TSH 1.23, vitamin B12 was 1324.  Nuclear medicine GI bleeding study 09/02/2017 was a positive tagged red cell study with activity in the right abdomen on dynamic phase imaging.  On SPECT-CT demonstrates this would be in the cecum and/or ascending colon.  IR visceral angiogram 09/02/2017 was a negative mesenteric angiogram.      CONSULTATIONS DURING THIS HOSPITALIZATION:  Surgery.      HOSPITAL COURSE:     1.  Lola Coto is an 82-year-old female who is on chronic anticoagulation for atrial fibrillation.  She failed an ablation treatment in the past and has been on flecainide to keep her in normal sinus rhythm.  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  Four or 5 years ago she underwent a colonoscopy which was quite difficult and the  needed to use nearly 3 scopes and recommended against future endoscopies.  Patient also has undergone unsuccessful ablation for atrial fibrillation.  She has also had post-procedure complications of a leg clot in the past as well.  In addition, she has hypertension, hyperlipidemia and hypothyroidism.  No history of heart disease, heart failure or coronary artery disease.  She was transferred from [place], [place] at the [facility] for lower GI bleed.  During that hospitalization, she had 8 units total of PRBCs.  Her anticoagulation was reversed with fresh frozen plasma and vitamin K.  Her most recent transfusion was the morning of transfer.  The patient initially was admitted to the ICU.  Her RBC scan was positive for right colonic bleed and the mesenteric angiogram was negative.  The patient had a right hemicolectomy done on 09/03/2017 and was transferred out of the ICU on 09/04/2017.  She was seen by Surgery on the day of discharge and they recommended that the patient could be discharged on a low fiber diet with followup with Dr. Caraballo in 1-2  weeks.  They recommended iron supplementation and her hemoglobin was stable.  The patient did have a GoLYTELY prep on the day prior to discharge.  This had some reddish output but then the patient had a loose brownish stool.  The patient is stable to be discharged per Surgery.  Surgery also felt that the patient could restart on her anticoagulation.   2.  Proximal atrial fibrillation, long-term use of anticoagulation.  She has had ablation.  Is on flecainide and warfarin.  Her rhythm was a mix of bigeminy and trigeminy.  An echocardiogram 01/23/2017 showed LVEF of 55% -60% and mild valvular abnormalities as well as atrial dilatation.  Patient will be continued on her Flecainide.  She will restart Warfarin 4 mg daily, with INR check midweek.  The patient is going to follow up with Dr. Bee who knows her well.  3.  Hypothyroidism.  The patient will continue on her levothyroxine 50 mcg daily.   4.  Benign essential hypertension.  Her blood pressure meds were initially held due to her GI bleed and then were restarted cautiously.  On the day of discharge, her blood pressure was on the high side, 144/73 and 156/87.  Therefore, we will increase her blood pressure medication back to their usual dosages.   5.  Type 2 diabetes.  The patient prior to admission was on metformin and this will be restarted.   6.  Early pressure injury located on the coccyx, small stage II, community-acquired and improving slowly.  The patient was seen by the wound care nurse.  They recommended that the patient reposition q.1-2h. side to side and on back only for meals.  When in a chair to use pillows or cushions.  The patient should stand up every hour.  The patient also had a pulsatile air mattress while she was hospitalized.  Would like to have this followed up by her primary care provider.   7.  History of mixed hyperlipidemia.  The patient was continued on atorvastatin and fenofibrate.      CODE STATUS:  Full code.      Time for this  discharge approximately 40 minutes.         JD KIM MD             D: 2017 12:27   T: 2017 17:19   MT: [place]      Name:     JOSE RAMON LOGAN   MRN:      [id number]        Account:        [id number]   :      1934           Admit Date:                                       Discharge Date: 2017      Document: [id number]       cc: Tram Razo MD

## 2017-09-09 NOTE — PHARMACY-ANTICOAGULATION SERVICE
Clinical Pharmacy - Warfarin Dosing Consult     Pharmacy has been consulted to manage this patient s warfarin therapy.  Indication: Atrial Fibrillation  Therapy Goal: INR 2-3  Warfarin Prior to Admission: Yes  Warfarin PTA Regimen: 4 mg daily    INR   Date Value Ref Range Status   09/09/2017 0.94 0.86 - 1.14 Final   09/02/2017 1.01 0.86 - 1.14 Final       Recommend warfarin 5  mg today.  Pharmacy will monitor Lola Coto daily and order warfarin doses to achieve specified goal.      Please contact pharmacy as soon as possible if the warfarin needs to be held for a procedure or if the warfarin goals change.

## 2017-09-11 ENCOUNTER — TELEPHONE (OUTPATIENT)
Dept: FAMILY MEDICINE | Facility: CLINIC | Age: 82
End: 2017-09-11

## 2017-09-11 NOTE — TELEPHONE ENCOUNTER
"Hospital/TCU/ED for chronic condition Discharge Protocol    \"Hi, my name is Jadiel Curtis, a registered nurse, and I am calling from [facility].  I am calling to follow up and see how things are going for you after your recent emergency visit/hospital/TCU stay.\"    Tell me how you are doing now that you are home?\" Good       Discharge Instructions    \"Let's review your discharge instructions.  What is/are the follow-up recommendations?  Pt. Response: I need to come in and have some labs     \"Has an appointment with your primary care provider been scheduled?\"   Yes. (confirm)    \"When you see the provider, I would recommend that you bring your medications with you.\"    Medications    \"Tell me what changed about your medicines when you discharged?\"    Changes to chronic meds?    0-1    \"What questions do you have about your medications?\"    None     New diagnoses of heart failure, COPD, diabetes, or MI?    No            On warfarin: \"Were you given any recommendations for follow-up with the anticoagulation clinic?\" Yes - Anticoagulation clinic appointment is already scheduled at appropriate interval    Medication reconciliation completed? Yes  Was MTM referral placed (*Make sure to put transitions as reason for referral)?   No    Call Summary    \"What questions or concerns do you have about your recent visit and your follow-up care?\"     none    \"If you have questions or things don't continue to improve, we encourage you contact us through the main clinic number (give number).  Even if the clinic is not open, triage nurses are available 24/7 to help you.     We would like you to know that our clinic has extended hours (provide information).  We also have urgent care (provide details on closest location and hours/contact info)\"      \"Thank you for your time and take care!\"           "

## 2017-09-13 ENCOUNTER — ANTICOAGULATION THERAPY VISIT (OUTPATIENT)
Dept: NURSING | Facility: CLINIC | Age: 82
End: 2017-09-13
Payer: MEDICARE

## 2017-09-13 ENCOUNTER — OFFICE VISIT (OUTPATIENT)
Dept: FAMILY MEDICINE | Facility: CLINIC | Age: 82
End: 2017-09-13
Payer: MEDICARE

## 2017-09-13 ENCOUNTER — TELEPHONE (OUTPATIENT)
Dept: FAMILY MEDICINE | Facility: CLINIC | Age: 82
End: 2017-09-13

## 2017-09-13 VITALS
BODY MASS INDEX: 19.81 KG/M2 | TEMPERATURE: 98.5 F | RESPIRATION RATE: 14 BRPM | HEIGHT: 64 IN | SYSTOLIC BLOOD PRESSURE: 128 MMHG | HEART RATE: 78 BPM | DIASTOLIC BLOOD PRESSURE: 60 MMHG | WEIGHT: 116 LBS

## 2017-09-13 DIAGNOSIS — I48.0 PAROXYSMAL ATRIAL FIBRILLATION (H): Chronic | ICD-10-CM

## 2017-09-13 DIAGNOSIS — D62 ANEMIA DUE TO BLOOD LOSS, ACUTE: Primary | ICD-10-CM

## 2017-09-13 DIAGNOSIS — I10 BENIGN ESSENTIAL HYPERTENSION: Chronic | ICD-10-CM

## 2017-09-13 DIAGNOSIS — I48.20 CHRONIC ATRIAL FIBRILLATION (H): Primary | ICD-10-CM

## 2017-09-13 LAB
BASOPHILS # BLD AUTO: 0 10E9/L (ref 0–0.2)
BASOPHILS NFR BLD AUTO: 0.3 %
DIFFERENTIAL METHOD BLD: ABNORMAL
EOSINOPHIL # BLD AUTO: 0 10E9/L (ref 0–0.7)
EOSINOPHIL NFR BLD AUTO: 0.3 %
ERYTHROCYTE [DISTWIDTH] IN BLOOD BY AUTOMATED COUNT: 16.4 % (ref 10–15)
HCT VFR BLD AUTO: 25.7 % (ref 35–47)
HGB BLD-MCNC: 8.2 G/DL (ref 11.7–15.7)
INR POINT OF CARE: 1.4 (ref 0.86–1.14)
LYMPHOCYTES # BLD AUTO: 1 10E9/L (ref 0.8–5.3)
LYMPHOCYTES NFR BLD AUTO: 9.4 %
MCH RBC QN AUTO: 30.4 PG (ref 26.5–33)
MCHC RBC AUTO-ENTMCNC: 31.9 G/DL (ref 31.5–36.5)
MCV RBC AUTO: 95 FL (ref 78–100)
MONOCYTES # BLD AUTO: 1 10E9/L (ref 0–1.3)
MONOCYTES NFR BLD AUTO: 10 %
NEUTROPHILS # BLD AUTO: 8.1 10E9/L (ref 1.6–8.3)
NEUTROPHILS NFR BLD AUTO: 80 %
PLATELET # BLD AUTO: 506 10E9/L (ref 150–450)
POTASSIUM SERPL-SCNC: 3.9 MMOL/L (ref 3.4–5.3)
RBC # BLD AUTO: 2.7 10E12/L (ref 3.8–5.2)
WBC # BLD AUTO: 10.1 10E9/L (ref 4–11)

## 2017-09-13 PROCEDURE — 85025 COMPLETE CBC W/AUTO DIFF WBC: CPT | Performed by: FAMILY MEDICINE

## 2017-09-13 PROCEDURE — 99207 ZZC NO CHARGE NURSE ONLY: CPT

## 2017-09-13 PROCEDURE — 36416 COLLJ CAPILLARY BLOOD SPEC: CPT

## 2017-09-13 PROCEDURE — 84132 ASSAY OF SERUM POTASSIUM: CPT | Performed by: FAMILY MEDICINE

## 2017-09-13 PROCEDURE — 99496 TRANSJ CARE MGMT HIGH F2F 7D: CPT | Performed by: FAMILY MEDICINE

## 2017-09-13 PROCEDURE — 85610 PROTHROMBIN TIME: CPT | Mod: QW

## 2017-09-13 NOTE — PATIENT INSTRUCTIONS
We'll repeat her hemoglobin in 2 weeks.  Will continue on iron supplementation one daily.  Will await the rest of her testing which includes her potassium and INR.  Follow-up otherwise will be pending review of those tests.  She will continue on a low fiber diet and she has the information for that.  Follow-up will otherwise be as needed.  She has a follow-up appointment with surgery in a couple of days.    Her blood pressure, which had been low in the hospital, is now back to normal.  She continues on her medications as noted.  I did not adjust any of those today.  I suspect her fatigue that she is complaining about will go away as her hemoglobin goes back up.  Her hemoglobin was up to 8.2 today and was 7.7 four days ago.

## 2017-09-13 NOTE — PROGRESS NOTES
ANTICOAGULATION FOLLOW-UP CLINIC VISIT    Patient Name:  Lola Coto  Date:  9/13/2017  Contact Type:  Face to Face    SUBJECTIVE:     Patient Findings     Positives Dental/Other procedures (had surgery in [place], was on 2 week hold), Initiation of therapy (restart from procedure hold)           OBJECTIVE    INR Protime   Date Value Ref Range Status   09/13/2017 1.4 (A) 0.86 - 1.14 Final       ASSESSMENT / PLAN  INR assessment SUB    Recheck INR In: 1 WEEK    INR Location Outside lab      Anticoagulation Summary as of 9/13/2017     INR goal 2.0-3.0    Today's INR 1.4!    Maintenance plan 2 mg (2 mg x 1) on Thu; 4 mg (2 mg x 2) all other days    Full instructions 9/13: 6 mg; 9/14: 4 mg; Otherwise 2 mg on Thu; 4 mg all other days    Weekly total 26 mg    Plan last modified Riki Bennett RN (9/13/2017)    Next INR check 9/20/2017    Target end date       Anticoagulation Episode Summary     INR check location     Preferred lab     Send INR reminders to [facility] COORDINATOR POOL    Comments             See the Encounter Report to view Anticoagulation Flowsheet and Dosing Calendar (Go to Encounters tab in chart review, and find the Anticoagulation Therapy Visit)    Normally followed in [place] or [place] between other homes.  Here today for follow up from surgery.  Restarted last week, not up to range yet.  Had 8 pints blood with surgery, started on iron pills.  Will dose to increase levels back to therapeutic, then resume normal schedule.  Asked to have INR check in [place], and fax results here.    Isadora Alvarado, PharmD, BCACP anticoagulation pharmacist    Riki Bennett, FERNANDO

## 2017-09-13 NOTE — MR AVS SNAPSHOT
After Visit Summary   9/13/2017    Lola Coto    MRN: [id number]           Patient Information     Date Of Birth          9/29/1934        Visit Information        Provider Department      9/13/2017 10:30 AM Fidel Phillips [facility]        Today's Diagnoses     Anemia due to blood loss, acute    -  1    Paroxysmal atrial fibrillation (H)        Benign essential hypertension          Care Instructions    We'll repeat her hemoglobin in 2 weeks.  Will continue on iron supplementation one daily.  Will await the rest of her testing which includes her potassium and INR.  Follow-up otherwise will be pending review of those tests.  She will continue on a low fiber diet and she has the information for that.  Follow-up will otherwise be as needed.  She has a follow-up appointment with surgery in a couple of days.    Her blood pressure, which had been low in the hospital, is now back to normal.  She continues on her medications as noted.  I did not adjust any of those today.  I suspect her fatigue that she is complaining about will go away as her hemoglobin goes back up.  Her hemoglobin was up to 8.2 today and was 7.7 four days ago.          Follow-ups after your visit        Follow-up notes from your care team     Return in about 2 weeks (around 9/27/2017) for Lab Work.      Your next 10 appointments already scheduled     Sep 14, 2017  2:00 PM CDT   Post Op with Randy Caraballo MD   [facility]    [address], [address]  [facility] [id number] [phone]              Future tests that were ordered for you today     Open Future Orders        Priority Expected Expires Ordered    CBC with platelets differential Routine  9/30/2017 9/13/2017            Who to contact     If you have questions or need follow up information about today's clinic visit or your schedule please contact [facility]  [facility] directly at [phone].  Normal or non-critical lab and imaging results will be communicated to you by MyChart, letter or phone within 4 business days after the clinic has received the results. If you do not hear from us within 7 days, please contact the clinic through MyChart or phone. If you have a critical or abnormal lab result, we will notify you by phone as soon as possible.  Submit refill requests through [facility] or call your pharmacy and they will forward the refill request to us. Please allow 3 business days for your refill to be completed.          Additional Information About Your Visit        Care EveryWhere ID     This is your Care EveryWhere ID. This could be used by other organizations to access your [facility] medical records  [id number]        Your Vitals Were     Pulse Temperature Respirations Height Last Period BMI (Body Mass Index)    78 98.5  F (36.9  C) (Tympanic) 14 5' 4\" (1.626 m) (LMP Unknown) 19.91 kg/m2       Blood Pressure from Last 3 Encounters:   09/13/17 128/60   09/09/17 124/71   05/16/17 118/60    Weight from Last 3 Encounters:   09/13/17 116 lb (52.6 kg)   09/08/17 123 lb 0.3 oz (55.8 kg)   05/16/17 122 lb (55.3 kg)              We Performed the Following     CBC with platelets differential     Potassium        Primary Care Provider Office Phone # Fax #    Rodger Razo [phone]       [address] [facility] [address]        Equal Access to Services     [facility] EDILSON : Hadii jovanna hampton Sokvng, waaxda luqadaha, qaybta kaalmada anuj brown. So [facility] [phone].    ATENCIÓN: Si habla español, tiene a delacruz disposición servicios gratuitos de asistencia lingüística. Llame al [phone].    We comply with applicable federal civil rights laws and [place] laws. We do not discriminate on the basis of race, color, national origin, age, disability sex, sexual orientation or gender identity.          "   Thank you!     Thank you for choosing [facility]  for your care. Our goal is always to provide you with excellent care. Hearing back from our patients is one way we can continue to improve our services. Please take a few minutes to complete the written survey that you may receive in the mail after your visit with us. Thank you!             Your Updated Medication List - Protect others around you: Learn how to safely use, store and throw away your medicines at www.disposemymeds.org.          This list is accurate as of: 9/13/17 12:43 PM.  Always use your most recent med list.                   Brand Name Dispense Instructions for use Diagnosis    amLODIPine 10 MG tablet    NORVASC    90 tablet    Take 1 tablet (10 mg) by mouth daily    Benign essential hypertension       atorvastatin 10 MG tablet    LIPITOR    90 tablet    TAKE 1 TABLET BY MOUTH ONCE DAILY    Essential hypertension with goal blood pressure less than 140/90       benazepril 40 MG tablet    LOTENSIN    90 tablet    Take 1 tablet (40 mg) by mouth daily    Benign essential hypertension       Calcium-Vitamin D 600-200 MG-UNIT Tabs      Take 1 tablet by mouth daily        CENTRUM SILVER PO      Take 1 tablet by mouth daily        estradiol 0.025 MG/24HR Ptwk WK patch    FEMPATCH    12 patch    UNWRAP AND APPLY 1 PATCH TRANSDERMALLY EVERY WEEK    Hormone replacement therapy (postmenopausal)       fenofibrate 145 MG tablet     90 tablet    TAKE 1 TABLET BY MOUTH EVERY DAY    Hyperlipidemia LDL goal <100       ferrous sulfate 325 (65 FE) MG tablet    IRON    30 tablet    Take 1 tablet (325 mg) by mouth daily (with breakfast)    Anemia due to blood loss, acute       flecainide 100 MG tablet    TAMBOCOR    180 tablet    Take 1 tablet (100 mg) by mouth 2 times daily    Paroxysmal atrial fibrillation (H)       levothyroxine 50 MCG tablet    SYNTHROID/LEVOTHROID    90 tablet    TAKE 1 TABLET BY MOUTH ONCE DAILY    Hypothyroidism,  unspecified type       metFORMIN 500 MG tablet    GLUCOPHAGE    270 tablet    Take 1 tablet (500 mg) by mouth 3 times daily (with meals) Patient takes one in the morning and two in the evening    Type 2 diabetes mellitus without complication (H)       oxyCODONE 5 MG IR tablet    ROXICODONE    20 tablet    Take 1-2 tablets (5-10 mg) by mouth every 4 hours as needed for moderate to severe pain    Postoperative pain       warfarin 2 MG tablet    COUMADIN    180 tablet    Take 2 tablets (4 mg) by mouth daily    Paroxysmal atrial fibrillation (H)

## 2017-09-13 NOTE — MR AVS SNAPSHOT
Lola Coto   9/13/2017 11:00 AM   Anticoagulation Therapy Visit    Description:  82 year old female   Provider:  [facility]   Department:  Bx Nurse           INR as of 9/13/2017     Today's INR 1.4!      Anticoagulation Summary as of 9/13/2017     INR goal 2.0-3.0    Today's INR 1.4!    Full instructions 9/13: 6 mg; 9/14: 4 mg; Otherwise 2 mg on Thu; 4 mg all other days    Next INR check 9/20/2017      Contact Numbers     [facility]  Please call  [phone] to cancel and/or reschedule your appointment   The direct line to the anticoagulant nurse is [phone] on Monday, Wednesday, and Friday. On Thursday, the anticoagulant nurse can be reached directly at [phone].         September 2017 Details    Sun Mon Tue Wed Thu Fri Sat          1               2                 3               4               5               6               7               8               9                 10               11               12               13      6 mg   See details      14      4 mg         15      4 mg         16      4 mg           17      4 mg         18      4 mg         19      4 mg         20            21               22               23                 24               25               26               27               28               29               30                Date Details   09/13 This INR check       Date of next INR:  9/20/2017         How to take your warfarin dose     To take:  4 mg Take 2 of the 2 mg tablets.    To take:  6 mg Take 3 of the 2 mg tablets.

## 2017-09-13 NOTE — TELEPHONE ENCOUNTER
Since Lola was seen in clinic and warfarin dose was adjusted we do need a referral signed to cover our dosing adjustments.  Lola was asked to fax results from her draw next week in [place] so we can also document the effect of today's dose adjustment.    Thank you!    Isadora Alvarado, PharmD [facility]  Anticoagulation Clinical Pharmacist

## 2017-09-13 NOTE — PROGRESS NOTES
SUBJECTIVE:   Lola Coto is a 82 year old female who presents to clinic today for the following health issues:          Hospital Follow-up Visit:    Hospital/Nursing Home/IP Rehab Facility: [facility]  Date of Admission: 9/2/17  Date of Discharge: 9/9/17  Reason(s) for Admission: surgery for diverticulitis, anemia            Problems taking medications regularly:  None       Medication changes since discharge: None       Problems adhering to non-medication therapy:  None    Summary of hospitalization:  [facility] discharge summary reviewed  Diagnostic Tests/Treatments reviewed.  Follow up needed: Hgb, INR and potassium  Other Healthcare Providers Involved in Patient s Care:         Specialist appointment - This week with general surgery  Update since discharge: improved.     Post Discharge Medication Reconciliation: discharge medications reconciled, continue medications without change.  Plan of care communicated with patient     Coding guidelines for this visit:  Type of Medical   Decision Making Face-to-Face Visit       within 7 Days of discharge Face-to-Face Visit        within 14 days of discharge   Moderate Complexity 75149 [id number]   High Complexity 26760 32029                  Problem list and histories reviewed & adjusted, as indicated.  Additional history: as documented    Patient Active Problem List   Diagnosis     Paroxysmal atrial fibrillation (H)     Hypothyroid     Preventive measure     Diverticulitis of colon     GIB (gastrointestinal bleeding)     Bilateral inguinal hernia     Hormone replacement therapy (postmenopausal)     Osteopenia     Thyroid nodule     Type 2 diabetes mellitus without complication (H)     First degree atrioventricular block     PVC's (premature ventricular contractions)     IVCD (intraventricular conduction defect)     Benign essential hypertension     Mixed hyperlipidemia     Long-term (current) use of anticoagulants [Z79.01]     Diverticular  hemorrhage     Anemia due to blood loss, acute     Past Surgical History:   Procedure Laterality Date     COLECTOMY RIGHT Right 9/3/2017    Procedure: COLECTOMY RIGHT;  EXPLORATORY LAPAROTOMY, RIGHT TYESHA COLECTOMY;  Surgeon: Randy Caraballo MD;  Location:  OR     COLONOSCOPY  10/2013    diverticulosis     COSMETIC SURGERY  1990    face lift     DENTAL SURGERY  1954    wisdom     HERNIORRHAPHY INGUINAL BILATERAL  5/23/2014    Procedure: HERNIORRHAPHY INGUINAL BILATERAL;  Surgeon: Christ Spain MD;  Location:  [facility]     HYSTERECTOMY  1980    endometriosis - abdominal took ovaries     ROTATOR CUFF REPAIR RT/LT  5/13    right; in [place]       Social History   Substance Use Topics     Smoking status: Never Smoker     Smokeless tobacco: Never Used     Alcohol use Yes      Comment: wine night     Family History   Problem Relation Age of Onset     HEART DISEASE Mother      CEREBROVASCULAR DISEASE Father      Arrhythmia Sister      DIABETES No family hx of      Coronary Artery Disease No family hx of      Hypertension No family hx of      Hyperlipidemia No family hx of      Breast Cancer No family hx of      Colon Cancer No family hx of      Prostate Cancer No family hx of      Other Cancer No family hx of      Depression No family hx of      Anxiety Disorder No family hx of      MENTAL ILLNESS No family hx of      Substance Abuse No family hx of      Anesthesia Reaction No family hx of      Asthma No family hx of      OSTEOPOROSIS No family hx of      Genetic Disorder No family hx of      Thyroid Disease No family hx of      Obesity No family hx of      Unknown/Adopted No family hx of              Reviewed and updated as needed this visit by clinical staff  Tobacco  Allergies  Meds  Med Hx  Surg Hx  Fam Hx  Soc Hx      Reviewed and updated as needed this visit by Provider         ROS:  Constitutional, HEENT, cardiovascular, pulmonary, gi and gu systems are negative, except as otherwise  "noted.  CONSTITUTIONAL:NEGATIVE for fever, chills, change in weight and POSITIVE  for fatigue  GI: POSITIVE for poor appetite and loose stools and NEGATIVE for abdominal pain , hematochezia and melena      OBJECTIVE:                                                    /60  Pulse 78  Temp 98.5  F (36.9  C) (Tympanic)  Resp 14  Ht 5' 4\" (1.626 m)  Wt 116 lb (52.6 kg)  LMP  (LMP Unknown)  BMI 19.91 kg/m2  Body mass index is 19.91 kg/(m^2).  GENERAL APPEARANCE: healthy, alert and no distress  RESP: lungs clear to auscultation - no rales, rhonchi or wheezes  CV: regular rates and rhythm, normal S1 S2, no S3 or S4 and no murmur, click or rub  ABDOMEN: soft, nontender, without hepatosplenomegaly or masses and bowel sounds normal    Diagnostic test results:  Results for orders placed or performed in visit on 09/13/17 (from the past 24 hour(s))   CBC with platelets differential   Result Value Ref Range    WBC 10.1 4.0 - 11.0 10e9/L    RBC Count 2.70 (L) 3.8 - 5.2 10e12/L    Hemoglobin 8.2 (L) 11.7 - 15.7 g/dL    Hematocrit 25.7 (L) 35.0 - 47.0 %    MCV 95 78 - 100 fl    MCH 30.4 26.5 - 33.0 pg    MCHC 31.9 31.5 - 36.5 g/dL    RDW 16.4 (H) 10.0 - 15.0 %    Platelet Count 506 (H) 150 - 450 10e9/L    Diff Method Automated Method     % Neutrophils 80.0 %    % Lymphocytes 9.4 %    % Monocytes 10.0 %    % Eosinophils 0.3 %    % Basophils 0.3 %    Absolute Neutrophil 8.1 1.6 - 8.3 10e9/L    Absolute Lymphocytes 1.0 0.8 - 5.3 10e9/L    Absolute Monocytes 1.0 0.0 - 1.3 10e9/L    Absolute Eosinophils 0.0 0.0 - 0.7 10e9/L    Absolute Basophils 0.0 0.0 - 0.2 10e9/L          ASSESSMENT/PLAN:                                                        ICD-10-CM    1. Anemia due to blood loss, acute D62 CBC with platelets differential     Potassium     CBC with platelets differential   2. Paroxysmal atrial fibrillation (H) I48.0 CANCELED: INR   3. Benign essential hypertension I10        Patient Instructions   We'll repeat her " hemoglobin in 2 weeks.  Will continue on iron supplementation one daily.  Will await the rest of her testing which includes her potassium and INR.  Follow-up otherwise will be pending review of those tests.  She will continue on a low fiber diet and she has the information for that.  Follow-up will otherwise be as needed.  She has a follow-up appointment with surgery in a couple of days.    Her blood pressure, which had been low in the hospital, is now back to normal.  She continues on her medications as noted.  I did not adjust any of those today.  I suspect her fatigue that she is complaining about will go away as her hemoglobin goes back up.  Her hemoglobin was up to 8.2 today and was 7.7 four days ago.      Fidel Phillips MD  [facility]

## 2017-09-13 NOTE — NURSING NOTE
"Chief Complaint   Patient presents with     Hospital F/U       Initial /60  Pulse 78  Temp 98.5  F (36.9  C) (Tympanic)  Resp 14  Ht 5' 4\" (1.626 m)  Wt 116 lb (52.6 kg)  LMP  (LMP Unknown)  BMI 19.91 kg/m2 Estimated body mass index is 19.91 kg/(m^2) as calculated from the following:    Height as of this encounter: 5' 4\" (1.626 m).    Weight as of this encounter: 116 lb (52.6 kg).  Medication Reconciliation: complete     Alina Jauregui CMA      "

## 2017-09-14 ENCOUNTER — OFFICE VISIT (OUTPATIENT)
Dept: SURGERY | Facility: CLINIC | Age: 82
End: 2017-09-14
Payer: MEDICARE

## 2017-09-14 DIAGNOSIS — Z09 SURGERY FOLLOW-UP: Primary | ICD-10-CM

## 2017-09-14 PROCEDURE — 99024 POSTOP FOLLOW-UP VISIT: CPT | Performed by: SURGERY

## 2017-09-14 NOTE — LETTER
2017    Surgery Postop Note      RE:  Lola Coto-:  34     Lola Coto presents today for surgical followup. She is doing well following  Right hemicolecomy or lower GI bleeding.  Incisions look fine with no signs of wound infection.   She still feels very weak, which I expect to improve over the next couple of weeks.   I have instructed her to increase her fiber intake to improve her stool consistency. I expect her to make a complete recovery. Thank you for the opportunity to help in her care.     Sukumar Caraballo M.D.  [facility]  [phone]

## 2017-09-14 NOTE — MR AVS SNAPSHOT
After Visit Summary   9/14/2017    Lola Coto    MRN: [id number]           Patient Information     Date Of Birth          9/29/1934        Visit Information        Provider Department      9/14/2017 2:00 PM Randy Caraballo MD [facility] General Surgery      Today's Diagnoses     Surgery follow-up    -  1       Follow-ups after your visit        Future tests that were ordered for you today     Open Future Orders        Priority Expected Expires Ordered    CBC with platelets differential Routine  9/30/2017 9/13/2017            Who to contact     If you have questions or need follow up information about today's clinic visit or your schedule please contact [facility] directly at [phone].  Normal or non-critical lab and imaging results will be communicated to you by MyChart, letter or phone within 4 business days after the clinic has received the results. If you do not hear from us within 7 days, please contact the clinic through MyChart or phone. If you have a critical or abnormal lab result, we will notify you by phone as soon as possible.  Submit refill requests through [employer] or call your pharmacy and they will forward the refill request to us. Please allow 3 business days for your refill to be completed.          Additional Information About Your Visit        Care EveryWhere ID     This is your Care EveryWhere ID. This could be used by other organizations to access your [place] medical records  [id number]        Your Vitals Were     Last Period                   (LMP Unknown)            Blood Pressure from Last 3 Encounters:   09/13/17 128/60   09/09/17 124/71   05/16/17 118/60    Weight from Last 3 Encounters:   09/13/17 116 lb (52.6 kg)   09/08/17 123 lb 0.3 oz (55.8 kg)   05/16/17 122 lb (55.3 kg)              Today, you had the following     No orders found for display       Primary Care Provider Office Phone # Fax  #    Rodger L MD [facility] [phone]       [address]  [facility] [address]        Equal Access to Services     ROBERTSANJEEV EDILSON : Hadcipriano jovanna ku garlando Somaritzaali, waaxda luqadaha, qaybta kaalmada ademesfinyada, anuj kingsteven rj. So [facility] [phone].    ATENCIÓN: Si habla español, tiene a delacruz disposición servicios gratuitos de asistencia lingüística. Llame al [phone].    We comply with applicable federal civil rights laws and [place] laws. We do not discriminate on the basis of race, color, national origin, age, disability sex, sexual orientation or gender identity.            Thank you!     Thank you for choosing [facility]  for your care. Our goal is always to provide you with excellent care. Hearing back from our patients is one way we can continue to improve our services. Please take a few minutes to complete the written survey that you may receive in the mail after your visit with us. Thank you!             Your Updated Medication List - Protect others around you: Learn how to safely use, store and throw away your medicines at www.disposemymeds.org.          This list is accurate as of: 9/14/17  2:31 PM.  Always use your most recent med list.                   Brand Name Dispense Instructions for use Diagnosis    amLODIPine 10 MG tablet    NORVASC    90 tablet    Take 1 tablet (10 mg) by mouth daily    Benign essential hypertension       atorvastatin 10 MG tablet    LIPITOR    90 tablet    TAKE 1 TABLET BY MOUTH ONCE DAILY    Essential hypertension with goal blood pressure less than 140/90       benazepril 40 MG tablet    LOTENSIN    90 tablet    Take 1 tablet (40 mg) by mouth daily    Benign essential hypertension       Calcium-Vitamin D 600-200 MG-UNIT Tabs      Take 1 tablet by mouth daily        CENTRUM SILVER PO      Take 1 tablet by mouth daily        estradiol 0.025 MG/24HR Ptwk WK patch    FEMPATCH    12 patch    UNWRAP AND APPLY 1 PATCH  TRANSDERMALLY EVERY WEEK    Hormone replacement therapy (postmenopausal)       fenofibrate 145 MG tablet     90 tablet    TAKE 1 TABLET BY MOUTH EVERY DAY    Hyperlipidemia LDL goal <100       ferrous sulfate 325 (65 FE) MG tablet    IRON    30 tablet    Take 1 tablet (325 mg) by mouth daily (with breakfast)    Anemia due to blood loss, acute       flecainide 100 MG tablet    TAMBOCOR    180 tablet    Take 1 tablet (100 mg) by mouth 2 times daily    Paroxysmal atrial fibrillation (H)       levothyroxine 50 MCG tablet    SYNTHROID/LEVOTHROID    90 tablet    TAKE 1 TABLET BY MOUTH ONCE DAILY    Hypothyroidism, unspecified type       metFORMIN 500 MG tablet    GLUCOPHAGE    270 tablet    Take 1 tablet (500 mg) by mouth 3 times daily (with meals) Patient takes one in the morning and two in the evening    Type 2 diabetes mellitus without complication (H)       oxyCODONE 5 MG IR tablet    ROXICODONE    20 tablet    Take 1-2 tablets (5-10 mg) by mouth every 4 hours as needed for moderate to severe pain    Postoperative pain       warfarin 2 MG tablet    COUMADIN    180 tablet    Take 2 tablets (4 mg) by mouth daily    Paroxysmal atrial fibrillation (H)

## 2017-09-14 NOTE — PROGRESS NOTES
Surgery Postop Note    Lola Coto presents today for surgical followup.  she is doing well following  Right hemicolecomy or lower GI bleeding.  Incisions look fine with no signs of wound infection.   She still feels very weak, which I expect to improve over the next couple of weeks.   I have instructed her to increase her fiber intake to improve her stool consistency. I expect her to make a complete recovery.  Thank you for the opportunity to help in her care.    Sukumar Caraballo M.D.  [facility]  [phone]    Please route or send letter to:  Primary Care Provider (PCP) and Referring Provider

## 2017-09-19 ENCOUNTER — TELEPHONE (OUTPATIENT)
Dept: FAMILY MEDICINE | Facility: CLINIC | Age: 82
End: 2017-09-19

## 2017-09-19 DIAGNOSIS — R19.7 DIARRHEA, UNSPECIFIED TYPE: Primary | ICD-10-CM

## 2017-09-19 DIAGNOSIS — R15.9 INCONTINENCE OF FECES, UNSPECIFIED FECAL INCONTINENCE TYPE: ICD-10-CM

## 2017-09-19 NOTE — TELEPHONE ENCOUNTER
Surgery 2 weeks ago (right colectomy) and feels she  is getting better but has incontinence of stool (saw Dr. Fidel Phillips for post-hospital visit 9-13-17)  Doesn't  seem to be related to any  food or drink . Has always had a slight problem with stool incontinence before surgery (no blood seen) but since it's now a real problem.  Patient almost feels house-bound for fear of having accident when in public.she is not eating much so to as prevent the problem.  Patient did see Dr Caraballo for post surgery visit ; he told her to try Metamucil but that is not helping at all.    Patient is asking for GI referral.    She will be coming in for CBC next week,alyssa VO

## 2017-09-19 NOTE — TELEPHONE ENCOUNTER
Discussed with patient via phone.                 She is having diarrhea with incontinence.                Her right colon was resected.            She states she was told to avoid taking Imodium.         She states she was told to take a large dose of Metamucil b.i.d., but this is not tolerated and has not helped.                     She doesn't believe she was treated with antibiotics in the hospital. I had raised the issue of possible C. difficile colitis.               She is still taking metformin 1500 mg per day. She is taking ferrous sulfate daily.                    She wants a GI consult.                            Plan: Stop metformin, cut back ferrous sulfate to every other day. GI referral placed.

## 2017-09-26 ENCOUNTER — TELEPHONE (OUTPATIENT)
Dept: SURGERY | Facility: CLINIC | Age: 82
End: 2017-09-26

## 2017-09-26 NOTE — TELEPHONE ENCOUNTER
Name of caller: Patient    Reason for Call:  questions    Surgeon:  Dr. Caraballo     Recent Surgery:  Yes.    If yes, when & what type:  Hemicolectomy..09/03/17      Best phone number to reach pt at is: [phone]  Ok to leave a message with medical info? Yes.    Pharmacy preferred (if calling for a refill): na

## 2017-09-26 NOTE — TELEPHONE ENCOUNTER
Patient was called back, she has noticed over the past day increased redness and a very small amount of drainage from the middle of her incision area. Denies fever, nausea/vomiting, increased pain, or hot to touch. Discussed that this may be a fluid pocket and to continue to monitor area for increased pain or purulent drainage. Patient is out of town until late Thursday but would potentially like to see a PA or Dr. Caraballo on Friday. Will discuss with PA and see when she could possibly be seen. Patient verbalized understanding of this and agreed. Encouraged them to call back if they had further questions or concerns.    Amber Rea RN BSN

## 2017-09-27 LAB — INR POINT OF CARE: 2.3 (ref 0.86–1.14)

## 2017-09-28 DIAGNOSIS — I48.0 PAROXYSMAL ATRIAL FIBRILLATION (H): Chronic | ICD-10-CM

## 2017-09-28 RX ORDER — FLECAINIDE ACETATE 100 MG/1
TABLET ORAL
Qty: 180 TABLET | Refills: 0 | Status: SHIPPED | OUTPATIENT
Start: 2017-09-28 | End: 2017-12-26

## 2017-09-28 RX ORDER — WARFARIN SODIUM 2 MG/1
TABLET ORAL
Qty: 180 TABLET | Refills: 0 | Status: SHIPPED | OUTPATIENT
Start: 2017-09-28 | End: 2018-01-15

## 2017-09-28 NOTE — TELEPHONE ENCOUNTER
Warfarin (coumadin)2 mg   Last Written Prescription Date: 9/28/16 Last Fill Qty: 180, # refills: 3  Last Office Visit with [facility], [facility] or [facility] prescribing provider: 9/13/17  Next 5 appointments (look out 90 days)     Oct 10, 2017  4:00 PM CDT   [facility]    [address] [place] [address]   [phone]                   Date and Result of Last PT/INR:   Lab Results   Component Value Date    INR 1.4 09/13/2017    INR 0.94 09/09/2017    INR 1.01 09/02/2017    PT 31.4 09/12/2012    PT 20.6 08/20/2012      Flecainide (Tambacor) 100 mg    Last Written Prescription Date: 9/28/17  Last Fill Quantity: 180,  # refills: 3   Last Office Visit with [facility], [facility] or [facility] prescribing provider: 9/28/16                                         Next 5 appointments (look out 90 days)     Oct 10, 2017  4:00 PM CDT   [facility]   [facility]    [address] [place] [address] [phone]                  Prescription approved per [facility] Refill Protocol.

## 2017-09-29 ENCOUNTER — OFFICE VISIT (OUTPATIENT)
Dept: SURGERY | Facility: CLINIC | Age: 82
End: 2017-09-29
Payer: MEDICARE

## 2017-09-29 VITALS — RESPIRATION RATE: 14 BRPM | SYSTOLIC BLOOD PRESSURE: 130 MMHG | DIASTOLIC BLOOD PRESSURE: 82 MMHG | HEART RATE: 74 BPM

## 2017-09-29 DIAGNOSIS — Z09 SURGERY FOLLOW-UP EXAMINATION: Primary | ICD-10-CM

## 2017-09-29 DIAGNOSIS — D62 ANEMIA DUE TO BLOOD LOSS, ACUTE: ICD-10-CM

## 2017-09-29 LAB
BASOPHILS # BLD AUTO: 0.1 10E9/L (ref 0–0.2)
BASOPHILS NFR BLD AUTO: 0.7 %
DIFFERENTIAL METHOD BLD: ABNORMAL
EOSINOPHIL # BLD AUTO: 0.2 10E9/L (ref 0–0.7)
EOSINOPHIL NFR BLD AUTO: 2 %
ERYTHROCYTE [DISTWIDTH] IN BLOOD BY AUTOMATED COUNT: 16.4 % (ref 10–15)
HCT VFR BLD AUTO: 29.1 % (ref 35–47)
HGB BLD-MCNC: 9.1 G/DL (ref 11.7–15.7)
LYMPHOCYTES # BLD AUTO: 1.3 10E9/L (ref 0.8–5.3)
LYMPHOCYTES NFR BLD AUTO: 17 %
MCH RBC QN AUTO: 29.7 PG (ref 26.5–33)
MCHC RBC AUTO-ENTMCNC: 31.3 G/DL (ref 31.5–36.5)
MCV RBC AUTO: 95 FL (ref 78–100)
MONOCYTES # BLD AUTO: 0.7 10E9/L (ref 0–1.3)
MONOCYTES NFR BLD AUTO: 8.7 %
NEUTROPHILS # BLD AUTO: 5.4 10E9/L (ref 1.6–8.3)
NEUTROPHILS NFR BLD AUTO: 71.6 %
PLATELET # BLD AUTO: 323 10E9/L (ref 150–450)
RBC # BLD AUTO: 3.06 10E12/L (ref 3.8–5.2)
WBC # BLD AUTO: 7.5 10E9/L (ref 4–11)

## 2017-09-29 PROCEDURE — 85025 COMPLETE CBC W/AUTO DIFF WBC: CPT | Performed by: FAMILY MEDICINE

## 2017-09-29 PROCEDURE — 99024 POSTOP FOLLOW-UP VISIT: CPT | Performed by: PHYSICIAN ASSISTANT

## 2017-09-29 PROCEDURE — 36415 COLL VENOUS BLD VENIPUNCTURE: CPT | Performed by: FAMILY MEDICINE

## 2017-09-29 NOTE — MR AVS SNAPSHOT
After Visit Summary   9/29/2017    Lola Coto    MRN: [id number]           Patient Information     Date Of Birth          9/29/1934        Visit Information        Provider Department      9/29/2017 2:45 PM Ailyn Chao PA-C [facility] General Surgery      Today's Diagnoses     Surgery follow-up examination    -  1       Follow-ups after your visit        Follow-up notes from your care team     Return in about 2 weeks (around 10/13/2017).      Your next 10 appointments already scheduled     Oct 10, 2017  3:45 PM CDT   MA SCREENING DIGITAL BILATERAL with BXMA[facility]   [facility]    [address] [place] [address], [address]  [facility] [id number] [phone]           Do not use any powder, lotion or deodorant under your arms or on your breast. If you do, we will ask you to remove it before your exam.  Wear comfortable, two-piece clothing.  If you have any allergies, tell your care team.  Bring any previous mammograms from other facilities or have them mailed to the breast center.            Oct 10, 2017  4:00 PM CDT   SHORT with Rodger Razo MD   [facility]    [address] [place] [address]  [address]  [facility] [id number] [phone]            Oct 12, 2017  2:00 PM CDT   Post Op with Ailyn Chao PA-C   [facility]    [address], [address]  [facility] [id number] [phone]              Who to contact     If you have questions or need follow up information about today's clinic visit or your schedule please contact [facility] directly at [phone].  Normal or non-critical lab and imaging results will be communicated to you by MyChart, letter or phone within 4 business days after the clinic has received the  results. If you do not hear from us within 7 days, please contact the clinic through [employer] or phone. If you have a critical or abnormal lab result, we will notify you by phone as soon as possible.  Submit refill requests through [employer] or call your pharmacy and they will forward the refill request to us. Please allow 3 business days for your refill to be completed.          Additional Information About Your Visit        Care EveryWhere ID     This is your Care EveryWhere ID. This could be used by other organizations to access your [place] medical records  [id number]        Your Vitals Were     Pulse Respirations Last Period             74 14 (LMP Unknown)          Blood Pressure from Last 3 Encounters:   09/29/17 130/82   09/13/17 128/60   09/09/17 124/71    Weight from Last 3 Encounters:   09/13/17 52.6 kg (116 lb)   09/08/17 55.8 kg (123 lb 0.3 oz)   05/16/17 55.3 kg (122 lb)              Today, you had the following     No orders found for display       Primary Care Provider Office Phone # Fax #    Rodger Razo [phone] [address] [facility] [address]        Equal Access to Services     SANJEEV WAGGONER : Hadii jovanna haqueo Sokvng, waaxda luqadaha, qaybta kaalmada adeegyada, anuj de la rosa. So [facility] [phone].    ATENCIÓN: Si habla español, tiene a delacruz disposición servicios gratuitos de asistencia lingüística. Gasper al [phone].    We comply with applicable federal civil rights laws and [place] laws. We do not discriminate on the basis of race, color, national origin, age, disability, sex, sexual orientation, or gender identity.            Thank you!     Thank you for choosing [facility]  for your care. Our goal is always to provide you with excellent care. Hearing back from our patients is one way we can continue to improve our services. Please take a few minutes to complete the written survey that you may receive  in the mail after your visit with us. Thank you!             Your Updated Medication List - Protect others around you: Learn how to safely use, store and throw away your medicines at www.disposemymeds.org.          This list is accurate as of: 9/29/17  4:17 PM.  Always use your most recent med list.                   Brand Name Dispense Instructions for use Diagnosis    amLODIPine 10 MG tablet    NORVASC    90 tablet    Take 1 tablet (10 mg) by mouth daily    Benign essential hypertension       atorvastatin 10 MG tablet    LIPITOR    90 tablet    TAKE 1 TABLET BY MOUTH ONCE DAILY    Essential hypertension with goal blood pressure less than 140/90       benazepril 40 MG tablet    LOTENSIN    90 tablet    Take 1 tablet (40 mg) by mouth daily    Benign essential hypertension       Calcium-Vitamin D 600-200 MG-UNIT Tabs      Take 1 tablet by mouth daily        CENTRUM SILVER PO      Take 1 tablet by mouth daily        estradiol 0.025 MG/24HR Ptwk WK patch    FEMPATCH    12 patch    UNWRAP AND APPLY 1 PATCH TRANSDERMALLY EVERY WEEK    Hormone replacement therapy (postmenopausal)       fenofibrate 145 MG tablet     90 tablet    TAKE 1 TABLET BY MOUTH EVERY DAY    Hyperlipidemia LDL goal <100       ferrous sulfate 325 (65 FE) MG tablet    IRON    30 tablet    Take 1 tablet (325 mg) by mouth daily (with breakfast)    Anemia due to blood loss, acute       flecainide 100 MG tablet    TAMBOCOR    180 tablet    TAKE 1 TABLET BY MOUTH TWICE DAILY    Paroxysmal atrial fibrillation (H)       levothyroxine 50 MCG tablet    SYNTHROID/LEVOTHROID    90 tablet    TAKE 1 TABLET BY MOUTH ONCE DAILY    Hypothyroidism, unspecified type       metFORMIN 500 MG tablet    GLUCOPHAGE    270 tablet    Take 1 tablet (500 mg) by mouth 3 times daily (with meals) Patient takes one in the morning and two in the evening    Type 2 diabetes mellitus without complication (H)       warfarin 2 MG tablet    COUMADIN    180 tablet    TAKE 2 TABLETS BY MOUTH  DAILY    Paroxysmal atrial fibrillation (H)

## 2017-09-29 NOTE — PROGRESS NOTES
General Surgery  Today's Date: 9/29/2017      GUS Davila returns to the clinic today for recheck of her incision after undergoing right hemicolectomy for lower GI bleeding. She is 3.5 weeks postop and has overall been doing well, having much better stool consistency. However, a few days ago, she noticed some redness, swelling and yellow drainage from the lower aspect of her incision. She denies any pain in this area, and she actually feels it is starting to approve with less swelling and a smaller amount of drainage.        OBJECTIVE  General: thin female appearing stated age in no acute distress  Abdomen: soft, nontender, nondistended  Midline incision: well-healed with a small approximately 1x1cm slightly red area of swelling consistent with a seroma at the inferior aspect of the incision. There is one small opening and I am able to express a small amount of serous fluid from here. There is no purulence, warmth or other sign of infection. The seroma is palpable and very superficial. Patient has no tenderness to manipulation of the seroma or the skin around her incision.        ASSESSMENT/PLAN  Lola Coto is a 83 year old female s/p right hemicolectomy for lower GI bleed   - overall doing well, bowels functioning normally and not having any pain   - she has what appears to be a small, superficial seroma. I do not feel it would be beneficial to open up this area as it is spontaneously draining and starting to resolve   - Patient was given bacitracin, gauze and tape that she can apply to this area if desired for comfort and to keep the drainage from getting on her clothes   - I expect this to continue to resolve spontaneously without further issue   - She will return to see me in 1-2 weeks for recheck            Ailyn Chao PA-C  Office: [phone]  Pager: [phone]

## 2017-10-04 DIAGNOSIS — E03.9 HYPOTHYROIDISM, UNSPECIFIED TYPE: ICD-10-CM

## 2017-10-04 RX ORDER — LEVOTHYROXINE SODIUM 50 UG/1
TABLET ORAL
Qty: 90 TABLET | Refills: 2 | Status: SHIPPED | OUTPATIENT
Start: 2017-10-04 | End: 2018-05-15

## 2017-10-04 NOTE — TELEPHONE ENCOUNTER
levothyroxine (SYNTHROID, LEVOTHROID) 50 MCG tablet     Last Written Prescription Date: 11/2/16  Last Quantity: 90, # refills: 3  Last Office Visit with G, P or [facility] prescribing provider: 9/13/17   Next 5 appointments (look out 90 days)     Oct 10, 2017  4:00 PM CDT   Office Visit with Rodger Razo MD   [facility]    [address] [place] [id number] [phone]                   TSH   Date Value Ref Range Status   09/06/2017 1.23 0.40 - 4.00 mU/L Final

## 2017-10-10 ENCOUNTER — OFFICE VISIT (OUTPATIENT)
Dept: FAMILY MEDICINE | Facility: CLINIC | Age: 82
End: 2017-10-10
Payer: MEDICARE

## 2017-10-10 ENCOUNTER — ANTICOAGULATION THERAPY VISIT (OUTPATIENT)
Dept: NURSING | Facility: CLINIC | Age: 82
End: 2017-10-10
Payer: MEDICARE

## 2017-10-10 ENCOUNTER — RADIANT APPOINTMENT (OUTPATIENT)
Dept: MAMMOGRAPHY | Facility: CLINIC | Age: 82
End: 2017-10-10
Payer: MEDICARE

## 2017-10-10 VITALS
RESPIRATION RATE: 20 BRPM | BODY MASS INDEX: 19.63 KG/M2 | TEMPERATURE: 97.5 F | SYSTOLIC BLOOD PRESSURE: 120 MMHG | DIASTOLIC BLOOD PRESSURE: 72 MMHG | WEIGHT: 115 LBS | OXYGEN SATURATION: 100 % | HEIGHT: 64 IN | HEART RATE: 69 BPM

## 2017-10-10 DIAGNOSIS — Z12.31 VISIT FOR SCREENING MAMMOGRAM: ICD-10-CM

## 2017-10-10 DIAGNOSIS — E11.9 TYPE 2 DIABETES MELLITUS WITHOUT COMPLICATION, WITHOUT LONG-TERM CURRENT USE OF INSULIN (H): ICD-10-CM

## 2017-10-10 DIAGNOSIS — I10 BENIGN ESSENTIAL HYPERTENSION: Chronic | ICD-10-CM

## 2017-10-10 DIAGNOSIS — Z23 NEED FOR PROPHYLACTIC VACCINATION AND INOCULATION AGAINST INFLUENZA: ICD-10-CM

## 2017-10-10 DIAGNOSIS — I48.0 PAROXYSMAL ATRIAL FIBRILLATION (H): Chronic | ICD-10-CM

## 2017-10-10 DIAGNOSIS — D50.0 ANEMIA DUE TO BLOOD LOSS: Primary | ICD-10-CM

## 2017-10-10 LAB — INR POINT OF CARE: 2.4 (ref 0.86–1.14)

## 2017-10-10 PROCEDURE — 99207 ZZC NO CHARGE NURSE ONLY: CPT

## 2017-10-10 PROCEDURE — 85610 PROTHROMBIN TIME: CPT | Mod: QW

## 2017-10-10 PROCEDURE — 80048 BASIC METABOLIC PNL TOTAL CA: CPT | Performed by: INTERNAL MEDICINE

## 2017-10-10 PROCEDURE — 99214 OFFICE O/P EST MOD 30 MIN: CPT | Mod: 25 | Performed by: INTERNAL MEDICINE

## 2017-10-10 PROCEDURE — 85025 COMPLETE CBC W/AUTO DIFF WBC: CPT | Performed by: INTERNAL MEDICINE

## 2017-10-10 PROCEDURE — G0202 SCR MAMMO BI INCL CAD: HCPCS | Mod: TC

## 2017-10-10 PROCEDURE — 36416 COLLJ CAPILLARY BLOOD SPEC: CPT

## 2017-10-10 PROCEDURE — G0008 ADMIN INFLUENZA VIRUS VAC: HCPCS | Performed by: INTERNAL MEDICINE

## 2017-10-10 PROCEDURE — 90662 IIV NO PRSV INCREASED AG IM: CPT | Performed by: INTERNAL MEDICINE

## 2017-10-10 RX ORDER — FERROUS SULFATE 325(65) MG
325 TABLET ORAL EVERY OTHER DAY
Qty: 30 TABLET | Refills: 2 | COMMUNITY
Start: 2017-10-10 | End: 2018-05-15

## 2017-10-10 NOTE — PROGRESS NOTES
ANTICOAGULATION FOLLOW-UP CLINIC VISIT    Patient Name:  Lola Coto  Date:  10/10/2017  Contact Type:  Face to Face    SUBJECTIVE:     Patient Findings     Positives No Problem Findings           OBJECTIVE    INR Protime   Date Value Ref Range Status   10/10/2017 2.4 (A) 0.86 - 1.14 Final       ASSESSMENT / PLAN  INR assessment THER    Recheck INR In: 3 WEEKS    INR Location Clinic      Anticoagulation Summary as of 10/10/2017     INR goal 2.0-3.0    Today's INR 2.4    Maintenance plan 2 mg (2 mg x 1) on Mon, Fri; 4 mg (2 mg x 2) all other days    Full instructions 10/12: 4 mg; Otherwise 2 mg on Mon, Fri; 4 mg all other days    Weekly total 24 mg    Plan last modified Riki Bennett RN (10/10/2017)    Next INR check 10/31/2017    Target end date       Anticoagulation Episode Summary     INR check location     Preferred lab     Send INR reminders to [facility]ORDIN[facility] POOL    Comments             See the Encounter Report to view Anticoagulation Flowsheet and Dosing Calendar (Go to Encounters tab in chart review, and find the Anticoagulation Therapy Visit)    Dosage adjustment made based on physician directed care plan. Patient last checked her INR on 9/27/17 in [facility] [phone]. INR in range today, recheck in 3 weeks. Patient is planning to travel to [place] on Saturday where she has an INR clinic she goes to. She is back in May to [place].     Riki Bennett RN

## 2017-10-10 NOTE — MR AVS SNAPSHOT
After Visit Summary   10/10/2017    Lola Coto    MRN: [id number]           Patient Information     Date Of Birth          9/29/1934        Visit Information        Provider Department      10/10/2017 4:00 PM Rodger Razo MD [facility]        Today's Diagnoses     Anemia due to blood loss, acute    -  1    Type 2 diabetes mellitus without complication, without long-term current use of insulin (H)        Need for prophylactic vaccination and inoculation against influenza        Paroxysmal atrial fibrillation (H)        Benign essential hypertension          Care Instructions    I will let you know your lab results.    Have a good, safe winter.                Let's plan to see you in May 2018.                   Follow-ups after your visit        Your next 10 appointments already scheduled     Oct 12, 2017  2:00 PM CDT   Post Op with Ailyn Chao PA-C   [facility]    [address], [address]  [facility] [id number] [phone]              Who to contact     If you have questions or need follow up information about today's clinic visit or your schedule please contact [facility] directly at [phone].  Normal or non-critical lab and imaging results will be communicated to you by MyChart, letter or phone within 4 business days after the clinic has received the results. If you do not hear from us within 7 days, please contact the clinic through MyChart or phone. If you have a critical or abnormal lab result, we will notify you by phone as soon as possible.  Submit refill requests through [employer] or call your pharmacy and they will forward the refill request to us. Please allow 3 business days for your refill to be completed.          Additional Information About Your Visit        Care EveryWhere ID     This is your Care EveryWhere ID. This could be used by other  "organizations to access your [facility] medical records  [id number]        Your Vitals Were     Pulse Temperature Respirations Height Last Period Pulse Oximetry    69 97.5  F (36.4  C) 20 5' 4\" (1.626 m) (LMP Unknown) 100%    Breastfeeding? BMI (Body Mass Index)                No 19.74 kg/m2           Blood Pressure from Last 3 Encounters:   10/10/17 120/72   09/29/17 130/82   09/13/17 128/60    Weight from Last 3 Encounters:   10/10/17 115 lb (52.2 kg)   09/13/17 116 lb (52.6 kg)   09/08/17 123 lb 0.3 oz (55.8 kg)              We Performed the Following     Basic metabolic panel  (Ca, Cl, CO2, Creat, Gluc, K, Na, BUN)     CBC with platelets and differential          Today's Medication Changes          These changes are accurate as of: 10/10/17  4:32 PM.  If you have any questions, ask your nurse or doctor.               These medicines have changed or have updated prescriptions.        Dose/Directions    ferrous sulfate 325 (65 FE) MG tablet   Commonly known as:  IRON   This may have changed:  when to take this   Used for:  Anemia due to blood loss, acute   Changed by:  Rodger Razo MD        Dose:  325 mg   Take 1 tablet (325 mg) by mouth every other day   Quantity:  30 tablet   Refills:  2                Primary Care Provider Office Phone # Fax #    Rodger Razo [phone] [address] [facility] [id number]        Equal Access to Services     [facility]: Hadii jovanna ku hadasho Soomaali, waaxda luqadaha, qaybta kaalmada adeegyada, waxay alex ceballos . So [facility] [phone].    ATENCIÓN: Si habla español, tiene a delacruz disposición servicios gratuitos de asistencia lingüística. Llame al [phone].    We comply with applicable federal civil rights laws and [place] laws. We do not discriminate on the basis of race, color, national origin, age, disability, sex, sexual orientation, or gender identity.            Thank you!     Thank you for choosing " [facility]  for your care. Our goal is always to provide you with excellent care. Hearing back from our patients is one way we can continue to improve our services. Please take a few minutes to complete the written survey that you may receive in the mail after your visit with us. Thank you!             Your Updated Medication List - Protect others around you: Learn how to safely use, store and throw away your medicines at www.disposemymeds.org.          This list is accurate as of: 10/10/17  4:32 PM.  Always use your most recent med list.                   Brand Name Dispense Instructions for use Diagnosis    amLODIPine 10 MG tablet    NORVASC    90 tablet    Take 1 tablet (10 mg) by mouth daily    Benign essential hypertension       atorvastatin 10 MG tablet    LIPITOR    90 tablet    TAKE 1 TABLET BY MOUTH ONCE DAILY    Essential hypertension with goal blood pressure less than 140/90       benazepril 40 MG tablet    LOTENSIN    90 tablet    Take 1 tablet (40 mg) by mouth daily    Benign essential hypertension       Calcium-Vitamin D 600-200 MG-UNIT Tabs      Take 1 tablet by mouth daily        CENTRUM SILVER PO      Take 1 tablet by mouth daily        estradiol 0.025 MG/24HR Ptwk WK patch    FEMPATCH    12 patch    UNWRAP AND APPLY 1 PATCH TRANSDERMALLY EVERY WEEK    Hormone replacement therapy (postmenopausal)       fenofibrate 145 MG tablet     90 tablet    TAKE 1 TABLET BY MOUTH EVERY DAY    Hyperlipidemia LDL goal <100       ferrous sulfate 325 (65 FE) MG tablet    IRON    30 tablet    Take 1 tablet (325 mg) by mouth every other day    Anemia due to blood loss, acute       flecainide 100 MG tablet    TAMBOCOR    180 tablet    TAKE 1 TABLET BY MOUTH TWICE DAILY    Paroxysmal atrial fibrillation (H)       levothyroxine 50 MCG tablet    SYNTHROID/LEVOTHROID    90 tablet    TAKE 1 TABLET BY MOUTH ONCE DAILY    Hypothyroidism, unspecified type       metFORMIN 500 MG tablet     GLUCOPHAGE    270 tablet    Take 1 tablet (500 mg) by mouth 3 times daily (with meals) Patient takes one in the morning and two in the evening    Type 2 diabetes mellitus without complication (H)       warfarin 2 MG tablet    COUMADIN    180 tablet    TAKE 2 TABLETS BY MOUTH DAILY    Paroxysmal atrial fibrillation (H)

## 2017-10-10 NOTE — PROGRESS NOTES
Injectable Influenza Immunization Documentation    1.  Is the person to be vaccinated sick today?   No    2. Does the person to be vaccinated have an allergy to a component   of the vaccine?   No    3. Has the person to be vaccinated ever had a serious reaction   to influenza vaccine in the past?   No    4. Has the person to be vaccinated ever had Guillain-Barré syndrome?   No    Form completed by Consuelo Malik LPN

## 2017-10-10 NOTE — PROGRESS NOTES
SUBJECTIVE:   Lola Coto is a 83 year old female who presents to clinic today for the following health issues:      Diabetes Follow-up      Patient is checking blood sugars: not at all    Diabetic concerns: None     Symptoms of hypoglycemia (low blood sugar): none     Paresthesias (numbness or burning in feet) or sores: No     Date of last diabetic eye exam: unknown    Hyperlipidemia Follow-Up      Rate your low fat/cholesterol diet?: good    Taking statin?  Yes, no muscle aches from statin    Other lipid medications/supplements?:  Fenofibrate, without side effects    Hypertension Follow-up      Outpatient blood pressures are not being checked.    Low Salt Diet: no added salt        Amount of exercise or physical activity: occ.    Problems taking medications regularly: No    Medication side effects: none  Diet: low salt, low fat/cholesterol and diabetic         Patient is here for hospital follow-up, and follow-up of diabetes, blood loss anemia, and diarrhea after surgery.                 See the phone call documentation of 9/19/17.                      Diarrhea resolved when she stopped taking metformin, and she is very happy about this.             She wonders what can be used for diabetes control rather than metformin.                               She continues to take Fe QOD.        Her most recent hemoglobin was up to 9.1.                   She is taking the remainder of her medications as per the medication list.                 She is anticoagulated again with her INR of 2.4. There has been no further bleeding.              She is leaving for [place] very soon and will be gone for 6 months.     She has a primary care doctor there.    Problem list and histories reviewed & adjusted, as indicated.      Patient Active Problem List    Diagnosis Date Noted     Type 2 diabetes mellitus without complication (H) 05/20/2014     Priority: High     GIB (gastrointestinal bleeding) 05/09/2014     Priority: High      While in [facility] 10/13; BRBPR; hgb down to 9.6;INR 3.6; transfused;  colonoscopy neg other than diverticulosis    hgb up to 11.7 in 4/14         Paroxysmal atrial fibrillation (H)      Priority: High     Ablation procedure 2008; NSR since with flecainide;           Echo in [facility] 4/15; no major problems       Anemia due to blood loss, acute 09/05/2017     Priority: Medium     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/2017     Priority: Medium     Long-term (current) use of anticoagulants [Z79.01] 05/16/2017     Priority: Medium     Benign essential hypertension 05/12/2017     Priority: Medium     Mixed hyperlipidemia 05/12/2017     Priority: Medium     First degree atrioventricular block 09/28/2016     Priority: Medium     PVC's (premature ventricular contractions) 09/28/2016     Priority: Medium     IVCD (intraventricular conduction defect) 09/28/2016     Priority: Medium     Bilateral inguinal hernia 05/09/2014     Priority: Medium     L > R;       Surgery 5/14       Hormone replacement therapy (postmenopausal) 05/09/2014     Priority: Medium     Chronic, low dose ( pt choice to continue long term)       Osteopenia 05/09/2014     Priority: Medium     Alendronate [id number]; long term ERT       Thyroid nodule 05/09/2014     Priority: Medium     See endo notes 2010; see US 2014; 5 nodules, all < 1 cm       Diverticulitis of colon 09/18/2013     Priority: Medium     Hypothyroid      Priority: Medium     Preventive measure 09/13/2013     Priority: Low     APRIMA DATA BASE UNDER THE 9/13/13 NOTE  Colonoscopy 10/13; neg  Mammogram 9/14, 9/15, 10/16         Past Surgical History:   Procedure Laterality Date     COLECTOMY RIGHT Right 9/3/2017    Procedure: COLECTOMY RIGHT;  EXPLORATORY LAPAROTOMY, RIGHT TYESHA COLECTOMY;  Surgeon: Randy Caraballo MD;  Location: [facility] OR     COLONOSCOPY  10/2013    diverticulosis     COSMETIC SURGERY  1990    face lift     DENTAL SURGERY  1954    wisdom     HERNIORRHAPHY INGUINAL BILATERAL  5/23/2014     Procedure: HERNIORRHAPHY INGUINAL BILATERAL;  Surgeon: Christ Spain MD;  Location:  [facility]     HYSTERECTOMY  1980    endometriosis - abdominal took ovaries     ROTATOR CUFF REPAIR RT/LT  5/13    right; in [place]     Social History     Social History     Marital status:      Spouse name: N/A     Number of children: 2     Years of education: N/A     Occupational History      Retired     Social History Main Topics     Smoking status: Never Smoker     Smokeless tobacco: Never Used     Alcohol use Yes      Comment: wine night     Drug use: No     Sexual activity: No     Other Topics Concern     Parent/Sibling W/ Cabg, Mi Or Angioplasty Before 65f 55m? No     Caffeine Concern No     coffee: 1 1/2 a day     Sleep Concern No     Stress Concern No     Weight Concern No     Special Diet Yes     low fat, low carbs     Exercise Yes     walking x3 a week     Seat Belt Yes     Social History Narrative       Current Outpatient Prescriptions   Medication Sig Dispense Refill     ferrous sulfate (IRON) 325 (65 FE) MG tablet Take 1 tablet (325 mg) by mouth every other day 30 tablet 2     levothyroxine (SYNTHROID/LEVOTHROID) 50 MCG tablet TAKE 1 TABLET BY MOUTH ONCE DAILY 90 tablet 2     warfarin (COUMADIN) 2 MG tablet TAKE 2 TABLETS BY MOUTH DAILY 180 tablet 0     flecainide (TAMBOCOR) 100 MG tablet TAKE 1 TABLET BY MOUTH TWICE DAILY 180 tablet 0     amLODIPine (NORVASC) 10 MG tablet Take 1 tablet (10 mg) by mouth daily 90 tablet 3     benazepril (LOTENSIN) 40 MG tablet Take 1 tablet (40 mg) by mouth daily 90 tablet 3     fenofibrate 145 MG tablet TAKE 1 TABLET BY MOUTH EVERY DAY 90 tablet 3     atorvastatin (LIPITOR) 10 MG tablet TAKE 1 TABLET BY MOUTH ONCE DAILY 90 tablet 3     estradiol (FEMPATCH) 0.025 MG/24HR PTWK UNWRAP AND APPLY 1 PATCH TRANSDERMALLY EVERY WEEK 12 patch 3     Calcium-Vitamin D 600-200 MG-UNIT TABS Take 1 tablet by mouth daily        Multiple Vitamins-Minerals (CENTRUM SILVER PO) Take 1 tablet by  "mouth daily                Allergies   Allergen Reactions     Sulfa Drugs Anaphylaxis     Bactrim [Sulfamethoxazole W-Trimethoprim] Hives     Erythromycin GI Disturbance     Monistat [Miconazole] Dermatitis     Cream caused perineal reaction      Nsaids GI Disturbance     Zofran [Ondansetron] Other (See Comments) and Nausea     Causes severe headache     BP Readings from Last 3 Encounters:   10/10/17 120/72   09/29/17 130/82   09/13/17 128/60    Wt Readings from Last 3 Encounters:   10/10/17 115 lb (52.2 kg)   09/13/17 116 lb (52.6 kg)   09/08/17 123 lb 0.3 oz (55.8 kg)                      Reviewed and updated as needed this visit by clinical staff     Reviewed and updated as needed this visit by Provider         ROS:  CONSTITUTIONAL:NEGATIVE for fever, chills, change in weight  RESP:NEGATIVE for significant cough or SOB  CV: NEGATIVE for chest pain, palpitations or peripheral edema  GI: NEGATIVE for hematochezia, melena, nausea and poor appetite    OBJECTIVE:                                                    /72 (BP Location: Left arm, Patient Position: Chair, Cuff Size: Adult Regular)  Pulse 69  Temp 97.5  F (36.4  C)  Resp 20  Ht 5' 4\" (1.626 m)  Wt 115 lb (52.2 kg)  LMP  (LMP Unknown)  SpO2 100%  Breastfeeding? No  BMI 19.74 kg/m2  Body mass index is 19.74 kg/(m^2).  GENERAL APPEARANCE: healthy, alert and no distress  RESP: lungs clear to auscultation - no rales, rhonchi or wheezes  CV: regular rates and rhythm, normal S1 S2, no S3 or S4 and no murmur, click or rub  Abdomen: Her surgical incision is healed  Diagnostic test results:  Pending; these labs are  5 hr pp today.            ASSESSMENT/PLAN:                                                        ICD-10-CM    1. Anemia due to blood loss D50.0 ferrous sulfate (IRON) 325 (65 FE) MG tablet     CBC with platelets and differential   2. Type 2 diabetes mellitus without complication, without long-term current use of insulin (H) E11.9 Basic " metabolic panel  (Ca, Cl, CO2, Creat, Gluc, K, Na, BUN)   3. Paroxysmal atrial fibrillation (H) I48.0    4. Benign essential hypertension I10    5. Need for prophylactic vaccination and inoculation against influenza Z23 FLU VACCINE, INCREASED ANTIGEN, PRESV FREE, AGE 65+ [91867]     ADMIN INFLUENZA (For MEDICARE Patients ONLY) []       Summary and implications:  We reviewed her situation at length.          She is quite stable now after her right hemicolectomy. She is doing well with respect to her bowels since metformin was stopped.          We will check a 5 hour postprandial glucose today. She does not have a glucometer. She will need to follow-up with her doctor in [place] for further evaluation of her diabetes control.                          We also need to monitor electrolytes and renal function.     Patient Instructions   I will let you know your lab results.    Have a good, safe winter.                Let's plan to see you in May 2018.               Rodger Razo MD  [facility]               Results for orders placed or performed in visit on 10/10/17   Basic metabolic panel  (Ca, Cl, CO2, Creat, Gluc, K, Na, BUN)   Result Value Ref Range    Sodium 139 133 - 144 mmol/L    Potassium 3.9 3.4 - 5.3 mmol/L    Chloride 106 94 - 109 mmol/L    Carbon Dioxide 23 20 - 32 mmol/L    Anion Gap 10 3 - 14 mmol/L    Glucose 94 70 - 99 mg/dL    Urea Nitrogen 20 7 - 30 mg/dL    Creatinine 0.90 0.52 - 1.04 mg/dL    GFR Estimate 60 (L) >60 mL/min/1.7m2    GFR Estimate If Black 72 >60 mL/min/1.7m2    Calcium 9.1 8.5 - 10.1 mg/dL   CBC with platelets and differential   Result Value Ref Range    WBC 5.6 4.0 - 11.0 10e9/L    RBC Count 3.36 (L) 3.8 - 5.2 10e12/L    Hemoglobin 10.0 (L) 11.7 - 15.7 g/dL    Hematocrit 31.5 (L) 35.0 - 47.0 %    MCV 94 78 - 100 fl    MCH 29.8 26.5 - 33.0 pg    MCHC 31.7 31.5 - 36.5 g/dL    RDW 15.9 (H) 10.0 - 15.0 %    Platelet Count 371 150 - 450 10e9/L    Diff  Method Automated Method     % Neutrophils 61.1 %    % Lymphocytes 22.6 %    % Monocytes 12.2 %    % Eosinophils 3.4 %    % Basophils 0.7 %    Absolute Neutrophil 3.4 1.6 - 8.3 10e9/L    Absolute Lymphocytes 1.3 0.8 - 5.3 10e9/L    Absolute Monocytes 0.7 0.0 - 1.3 10e9/L    Absolute Eosinophils 0.2 0.0 - 0.7 10e9/L    Absolute Basophils 0.0 0.0 - 0.2 10e9/L     Phone: d/w pt improving hemoglobin.   Letter sent.                   As we discussed,your hemoglobin continues to improve.       Your usual hemoglobin is approximately 12.5.    Your blood sugar is normal, at 94.        The electrolytes and kidney function are normal.

## 2017-10-10 NOTE — LETTER
October 11, 2017      Lola Coto  [address] [place] [address]        Dear ,    We are writing to inform you of your test results.              As we discussed,your hemoglobin continues to improve.       (Your usual hemoglobin is approximately 12.5.)                                                                                                                                 Your blood sugar is normal, at 94.        The electrolytes and kidney function are normal.     Resulted Orders   Basic metabolic panel  (Ca, Cl, CO2, Creat, Gluc, K, Na, BUN)   Result Value Ref Range    Sodium 139 133 - 144 mmol/L    Potassium 3.9 3.4 - 5.3 mmol/L    Chloride 106 94 - 109 mmol/L    Carbon Dioxide 23 20 - 32 mmol/L    Anion Gap 10 3 - 14 mmol/L    Glucose 94 70 - 99 mg/dL    Urea Nitrogen 20 7 - 30 mg/dL    Creatinine 0.90 0.52 - 1.04 mg/dL    GFR Estimate 60 (L) >60 mL/min/1.7m2      Comment:      Non  GFR Calc    GFR Estimate If Black 72 >60 mL/min/1.7m2      Comment:       GFR Calc    Calcium 9.1 8.5 - 10.1 mg/dL   CBC with platelets and differential   Result Value Ref Range    WBC 5.6 4.0 - 11.0 10e9/L    RBC Count 3.36 (L) 3.8 - 5.2 10e12/L    Hemoglobin 10.0 (L) 11.7 - 15.7 g/dL    Hematocrit 31.5 (L) 35.0 - 47.0 %    MCV 94 78 - 100 fl    MCH 29.8 26.5 - 33.0 pg    MCHC 31.7 31.5 - 36.5 g/dL    RDW 15.9 (H) 10.0 - 15.0 %    Platelet Count 371 150 - 450 10e9/L    Diff Method Automated Method     % Neutrophils 61.1 %    % Lymphocytes 22.6 %    % Monocytes 12.2 %    % Eosinophils 3.4 %    % Basophils 0.7 %    Absolute Neutrophil 3.4 1.6 - 8.3 10e9/L    Absolute Lymphocytes 1.3 0.8 - 5.3 10e9/L    Absolute Monocytes 0.7 0.0 - 1.3 10e9/L    Absolute Eosinophils 0.2 0.0 - 0.7 10e9/L    Absolute Basophils 0.0 0.0 - 0.2 10e9/L       If you have any questions or concerns, please call the clinic at the number listed above.       Sincerely,        Rodger Razo,  MD

## 2017-10-10 NOTE — MR AVS SNAPSHOT
Lola Coto   10/10/2017 3:30 PM   Anticoagulation Therapy Visit    Description:  83 year old female   Provider:  [facility]   Department:  Bx Nurse           INR as of 10/10/2017     Today's INR 2.4      Anticoagulation Summary as of 10/10/2017     INR goal 2.0-3.0    Today's INR 2.4    Full instructions 10/12: 4 mg; Otherwise 2 mg on Mon, Fri; 4 mg all other days    Next INR check 10/31/2017      Contact Numbers     [facility]  Please call  [phone] to cancel and/or reschedule your appointment   The direct line to the anticoagulant nurse is [phone] on Monday, Wednesday, and Friday. On Thursday, the anticoagulant nurse can be reached directly at [phone].         October 2017 Details    Sun Mon Tue Wed Thu Fri Sat     1               2               3               4               5               6               7                 8               9               10      4 mg   See details      11      4 mg         12      4 mg         13      2 mg         14      4 mg           15      4 mg         16      2 mg         17      4 mg         18      4 mg         19      4 mg         20      2 mg         21      4 mg           22      4 mg         23      2 mg         24      4 mg         25      4 mg         26      4 mg         27      2 mg         28      4 mg           29      4 mg         30      2 mg         31                 Date Details   10/10 This INR check       Date of next INR:  10/31/2017         How to take your warfarin dose     To take:  2 mg Take 1 of the 2 mg tablets.    To take:  4 mg Take 2 of the 2 mg tablets.

## 2017-10-11 LAB
ANION GAP SERPL CALCULATED.3IONS-SCNC: 10 MMOL/L (ref 3–14)
BASOPHILS # BLD AUTO: 0 10E9/L (ref 0–0.2)
BASOPHILS NFR BLD AUTO: 0.7 %
BUN SERPL-MCNC: 20 MG/DL (ref 7–30)
CALCIUM SERPL-MCNC: 9.1 MG/DL (ref 8.5–10.1)
CHLORIDE SERPL-SCNC: 106 MMOL/L (ref 94–109)
CO2 SERPL-SCNC: 23 MMOL/L (ref 20–32)
CREAT SERPL-MCNC: 0.9 MG/DL (ref 0.52–1.04)
DIFFERENTIAL METHOD BLD: ABNORMAL
EOSINOPHIL # BLD AUTO: 0.2 10E9/L (ref 0–0.7)
EOSINOPHIL NFR BLD AUTO: 3.4 %
ERYTHROCYTE [DISTWIDTH] IN BLOOD BY AUTOMATED COUNT: 15.9 % (ref 10–15)
GFR SERPL CREATININE-BSD FRML MDRD: 60 ML/MIN/1.7M2
GLUCOSE SERPL-MCNC: 94 MG/DL (ref 70–99)
HCT VFR BLD AUTO: 31.5 % (ref 35–47)
HGB BLD-MCNC: 10 G/DL (ref 11.7–15.7)
LYMPHOCYTES # BLD AUTO: 1.3 10E9/L (ref 0.8–5.3)
LYMPHOCYTES NFR BLD AUTO: 22.6 %
MCH RBC QN AUTO: 29.8 PG (ref 26.5–33)
MCHC RBC AUTO-ENTMCNC: 31.7 G/DL (ref 31.5–36.5)
MCV RBC AUTO: 94 FL (ref 78–100)
MONOCYTES # BLD AUTO: 0.7 10E9/L (ref 0–1.3)
MONOCYTES NFR BLD AUTO: 12.2 %
NEUTROPHILS # BLD AUTO: 3.4 10E9/L (ref 1.6–8.3)
NEUTROPHILS NFR BLD AUTO: 61.1 %
PLATELET # BLD AUTO: 371 10E9/L (ref 150–450)
POTASSIUM SERPL-SCNC: 3.9 MMOL/L (ref 3.4–5.3)
RBC # BLD AUTO: 3.36 10E12/L (ref 3.8–5.2)
SODIUM SERPL-SCNC: 139 MMOL/L (ref 133–144)
WBC # BLD AUTO: 5.6 10E9/L (ref 4–11)

## 2017-11-08 DIAGNOSIS — E78.5 HYPERLIPIDEMIA LDL GOAL <100: Chronic | ICD-10-CM

## 2017-11-08 RX ORDER — FENOFIBRATE 145 MG/1
TABLET, COATED ORAL
Qty: 90 TABLET | Refills: 1 | Status: SHIPPED | OUTPATIENT
Start: 2017-11-08 | End: 2018-05-02

## 2017-11-09 DIAGNOSIS — I10 ESSENTIAL HYPERTENSION WITH GOAL BLOOD PRESSURE LESS THAN 140/90: Chronic | ICD-10-CM

## 2017-11-09 RX ORDER — ATORVASTATIN CALCIUM 10 MG/1
10 TABLET, FILM COATED ORAL DAILY
Qty: 90 TABLET | Refills: 3 | Status: SHIPPED | OUTPATIENT
Start: 2017-11-09 | End: 2018-05-15

## 2017-11-09 NOTE — TELEPHONE ENCOUNTER
Reason for Call:  Medication or medication refill:    Do you use a [facility]?  Name of the pharmacy and phone number for the current request:  [facility] [address] [place] [id number] [facility] AT [facility]      Name of the medication requested: atorvastatin (LIPITOR) 10 MG tablet     Other request:     Can we leave a detailed message on this number? YES    Phone number patient can be reached at: Other phone number:  [phone]    Best Time: anytime     Call taken on 11/9/2017 at 11:16 AM by Mary Shahid

## 2017-11-09 NOTE — TELEPHONE ENCOUNTER
Prescription approved per [facility] [place] Protocol for 12 months of refills since last appointment, which was 10/10/17

## 2017-12-26 DIAGNOSIS — I48.0 PAROXYSMAL ATRIAL FIBRILLATION (H): Chronic | ICD-10-CM

## 2017-12-28 RX ORDER — FLECAINIDE ACETATE 100 MG/1
TABLET ORAL
Qty: 180 TABLET | Refills: 1 | Status: SHIPPED | OUTPATIENT
Start: 2017-12-28 | End: 2018-05-15

## 2017-12-28 NOTE — TELEPHONE ENCOUNTER
Flecainide      Last Written Prescription Date:  9-28-17  Last Fill Quantity: 180,   # refills: 0  Last Office Visit: 10-10-17  Future Office visit:       Routing refill request to provider for review/approval because:  Drug not on the [facility], [facility] or [facility] refill protocol or controlled substance

## 2018-01-15 DIAGNOSIS — I48.0 PAROXYSMAL ATRIAL FIBRILLATION (H): Chronic | ICD-10-CM

## 2018-01-15 RX ORDER — WARFARIN SODIUM 2 MG/1
TABLET ORAL
Qty: 180 TABLET | Refills: 1 | Status: SHIPPED | OUTPATIENT
Start: 2018-01-15 | End: 2018-08-03

## 2018-01-15 NOTE — TELEPHONE ENCOUNTER
warfarin (COUMADIN) 2 MG tablet     Prescription approved per [facility] Refill Protocol.    Note to pharmacy and VM left on pt's home answering machine: pt overdue for INR appointment.

## 2018-03-12 ENCOUNTER — TRANSFERRED RECORDS (OUTPATIENT)
Dept: HEALTH INFORMATION MANAGEMENT | Facility: CLINIC | Age: 83
End: 2018-03-12

## 2018-05-02 DIAGNOSIS — E78.5 HYPERLIPIDEMIA LDL GOAL <100: Chronic | ICD-10-CM

## 2018-05-02 RX ORDER — FENOFIBRATE 145 MG/1
TABLET, COATED ORAL
Qty: 90 TABLET | Refills: 0 | Status: SHIPPED | OUTPATIENT
Start: 2018-05-02 | End: 2018-05-15

## 2018-05-02 NOTE — TELEPHONE ENCOUNTER
"Requested Prescriptions   Pending Prescriptions Disp Refills     fenofibrate 145 MG tablet [Pharmacy Med Name: FENOFIBRATE 145MG TABLETS]  Last Written Prescription Date:  11/8/2017  Last Fill Quantity: 90 tablet,  # refills: 1   Last Office Visit  10/10/2017        with  [facility], [facility] or [facility] prescribing provider:     Future Office Visit:      90 tablet 0     Sig: TAKE 1 TABLET BY MOUTH EVERY DAY    Fibrates Passed    5/2/2018  7:14 AM       Passed - Lipid panel on file in past 12 months    Recent Labs   Lab Test  05/12/17   1047   09/30/15   1027   CHOL  162   < >  173   TRIG  85   < >  78   HDL  87   < >  96   LDL  58   < >  61   NHDL  75   < >   --    VLDL   --    --   16   CHOLHDLRATIO   --    --   1.8    < > = values in this interval not displayed.            Passed - No abnormal creatine kinase in past 12 months    No lab results found.          Passed - Recent (12 mo) or future (30 days) visit within the authorizing provider's specialty    Patient had office visit in the last 12 months or has a visit in the next 30 days with authorizing provider or within the authorizing provider's specialty.  See \"Patient Info\" tab in inbasket, or \"Choose Columns\" in Meds & Orders section of the refill encounter.           Passed - Patient is age 18 or older       Passed - No active pregnancy on record       Passed - No positive pregnancy test in past 12 months          "

## 2018-05-13 PROBLEM — Z79.01 LONG-TERM (CURRENT) USE OF ANTICOAGULANTS: Chronic | Status: ACTIVE | Noted: 2017-05-16

## 2018-05-15 ENCOUNTER — OFFICE VISIT (OUTPATIENT)
Dept: FAMILY MEDICINE | Facility: CLINIC | Age: 83
End: 2018-05-15
Payer: MEDICARE

## 2018-05-15 VITALS
HEART RATE: 71 BPM | RESPIRATION RATE: 20 BRPM | BODY MASS INDEX: 19.81 KG/M2 | DIASTOLIC BLOOD PRESSURE: 70 MMHG | TEMPERATURE: 97.5 F | HEIGHT: 64 IN | SYSTOLIC BLOOD PRESSURE: 110 MMHG | WEIGHT: 116 LBS | OXYGEN SATURATION: 100 %

## 2018-05-15 DIAGNOSIS — I10 BENIGN ESSENTIAL HYPERTENSION: Chronic | ICD-10-CM

## 2018-05-15 DIAGNOSIS — R26.89 POOR BALANCE: ICD-10-CM

## 2018-05-15 DIAGNOSIS — Z79.890 HORMONE REPLACEMENT THERAPY (POSTMENOPAUSAL): ICD-10-CM

## 2018-05-15 DIAGNOSIS — E78.2 MIXED HYPERLIPIDEMIA: Chronic | ICD-10-CM

## 2018-05-15 DIAGNOSIS — M17.10 ARTHRITIS OF KNEE: ICD-10-CM

## 2018-05-15 DIAGNOSIS — E03.9 HYPOTHYROIDISM, UNSPECIFIED TYPE: ICD-10-CM

## 2018-05-15 DIAGNOSIS — I48.0 PAROXYSMAL ATRIAL FIBRILLATION (H): Chronic | ICD-10-CM

## 2018-05-15 DIAGNOSIS — R91.8 PULMONARY NODULES: Primary | ICD-10-CM

## 2018-05-15 DIAGNOSIS — E11.9 TYPE 2 DIABETES MELLITUS WITHOUT COMPLICATION, WITHOUT LONG-TERM CURRENT USE OF INSULIN (H): ICD-10-CM

## 2018-05-15 PROBLEM — K57.31 DIVERTICULAR HEMORRHAGE: Status: ACTIVE | Noted: 2017-09-02

## 2018-05-15 PROCEDURE — 99214 OFFICE O/P EST MOD 30 MIN: CPT | Performed by: INTERNAL MEDICINE

## 2018-05-15 RX ORDER — BENAZEPRIL HYDROCHLORIDE 40 MG/1
40 TABLET ORAL DAILY
Qty: 90 TABLET | Refills: 3 | Status: SHIPPED | OUTPATIENT
Start: 2018-05-15 | End: 2019-06-03

## 2018-05-15 RX ORDER — LEVOTHYROXINE SODIUM 50 UG/1
50 TABLET ORAL DAILY
Qty: 90 TABLET | Refills: 3 | Status: SHIPPED | OUTPATIENT
Start: 2018-05-15 | End: 2019-07-10

## 2018-05-15 RX ORDER — FLECAINIDE ACETATE 100 MG/1
100 TABLET ORAL 2 TIMES DAILY
Qty: 180 TABLET | Refills: 3 | Status: SHIPPED | OUTPATIENT
Start: 2018-05-15 | End: 2019-07-07

## 2018-05-15 RX ORDER — FENOFIBRATE 145 MG/1
145 TABLET, COATED ORAL DAILY
Qty: 90 TABLET | Refills: 3 | Status: SHIPPED | OUTPATIENT
Start: 2018-05-15 | End: 2019-07-10

## 2018-05-15 RX ORDER — COLESEVELAM 180 1/1
TABLET ORAL
COMMUNITY

## 2018-05-15 RX ORDER — ESTRADIOL 0.03 MG/D
PATCH TRANSDERMAL
Qty: 12 PATCH | Refills: 3 | Status: SHIPPED | OUTPATIENT
Start: 2018-05-15 | End: 2019-07-10

## 2018-05-15 RX ORDER — ATORVASTATIN CALCIUM 10 MG/1
10 TABLET, FILM COATED ORAL DAILY
Qty: 90 TABLET | Refills: 3 | Status: SHIPPED | OUTPATIENT
Start: 2018-05-15 | End: 2019-06-14

## 2018-05-15 RX ORDER — AMLODIPINE BESYLATE 10 MG/1
10 TABLET ORAL DAILY
Qty: 90 TABLET | Refills: 3 | Status: SHIPPED | OUTPATIENT
Start: 2018-05-15 | End: 2019-06-03

## 2018-05-15 NOTE — PROGRESS NOTES
SUBJECTIVE:   Lola Coto is a 83 year old female who presents to clinic today for the following health issues:      Diabetes Follow-up      Patient is checking blood sugars: not at all    Diabetic concerns: None     Symptoms of hypoglycemia (low blood sugar): none     Paresthesias (numbness or burning in feet) or sores: No     Date of last diabetic eye exam: unknown    BP Readings from Last 2 Encounters:   05/15/18 110/70   10/10/17 120/72     Hemoglobin A1C (%)   Date Value   09/06/2017 5.3   05/12/2017 6.0     LDL Cholesterol Calculated (mg/dL)   Date Value   05/12/2017 58   09/28/2016 55     Hypertension Follow-up      Outpatient blood pressures are not being checked.    Low Salt Diet: no added salt      Amount of exercise or physical activity: None    Problems taking medications regularly: No    Medication side effects: none    Diet: low salt, low fat/cholesterol and diabetic               This patient is here in a 15 minute time slot with multiple questions issues and problems.                She is just back from [place], on her way to [place].                    Fell in 10/17 while in [place]; CHI,needed sutures,needed dental crowns,etc.                                 Balance is poor.                          Labs in 3/18 reviewed with pt.   overall, her lab results were very good, with hemoglobin 12.4, white count normal, platelets normal, urine normal, creatinine 0.6, glucose 102, A1c 6.2, TSH 3.2, cholesterol 174, LDL 55, liver enzymes normal.                                                   not taking Fe any longer.                                                                                           Saw GI with c/o diarrhea; taking welchol, 3 tabs bid.       Advised not to take imodium.                                                Has seen ortho re:R knee arthritis; euflexxa did not help. A steroid injection had helped a bit.               INR recently 2.4.                     Using the ERT patch less often.                            She wants medication refills.        She also wants a disability parking certificate.  Problem list and histories reviewed & adjusted, as indicated.        Current Outpatient Prescriptions   Medication Sig Dispense Refill     amLODIPine (NORVASC) 10 MG tablet Take 1 tablet (10 mg) by mouth daily 90 tablet 3     atorvastatin (LIPITOR) 10 MG tablet Take 1 tablet (10 mg) by mouth daily 90 tablet 3     benazepril (LOTENSIN) 40 MG tablet Take 1 tablet (40 mg) by mouth daily 90 tablet 3     Calcium-Vitamin D 600-200 MG-UNIT TABS Take 1 tablet by mouth daily        colesevelam (WELCHOL) 625 MG tablet Patient taking 3 tabs in morning, and 3 tabs at night       estradiol (FEMPATCH) 0.025 MG/24HR PTWK UNWRAP AND APPLY 1 PATCH TRANSDERMALLY EVERY WEEK (Patient taking differently: UNWRAP AND APPLY 1 PATCH TRANSDERMALLY EVERY WEEK(suppose to but always forgetting)-per/pt.) 12 patch 3     fenofibrate 145 MG tablet TAKE 1 TABLET BY MOUTH EVERY DAY 90 tablet 0     flecainide (TAMBOCOR) 100 MG tablet TAKE 1 TABLET BY MOUTH TWICE DAILY 180 tablet 1     levothyroxine (SYNTHROID/LEVOTHROID) 50 MCG tablet TAKE 1 TABLET BY MOUTH ONCE DAILY 90 tablet 2     Multiple Vitamins-Minerals (CENTRUM SILVER PO) Take 1 tablet by mouth daily        warfarin (COUMADIN) 2 MG tablet 2 mg (2 mg x 1) on Mon, Fri; 4 mg (2 mg x 2) all other days 180 tablet 1                     Allergies   Allergen Reactions     Sulfa Drugs Anaphylaxis     Bactrim [Sulfamethoxazole W-Trimethoprim] Hives     Erythromycin GI Disturbance     Monistat [Miconazole] Dermatitis     Cream caused perineal reaction      Nsaids GI Disturbance     Zofran [Ondansetron] Other (See Comments) and Nausea     Causes severe headache     BP Readings from Last 3 Encounters:   05/15/18 110/70   10/10/17 120/72   09/29/17 130/82    Wt Readings from Last 3 Encounters:   05/15/18 116 lb (52.6 kg)   10/10/17 115 lb (52.2 kg)   09/13/17 116  "lb (52.6 kg)                    Reviewed and updated as needed this visit by clinical staff       Reviewed and updated as needed this visit by Provider         ROS: See above plus  CONSTITUTIONAL:NEGATIVE for fever, chills, change in weight  RESP:NEGATIVE for significant cough or SOB  CV: NEGATIVE for chest pain, palpitations or peripheral edema  GI: NEGATIVE for nausea, abdominal pain, heartburn, or change in bowel habits    OBJECTIVE:                                                    /70 (BP Location: Right arm, Patient Position: Chair, Cuff Size: Adult Regular)  Pulse 71  Temp 97.5  F (36.4  C)  Resp 20  Ht 5' 4\" (1.626 m)  Wt 116 lb (52.6 kg)  LMP  (LMP Unknown)  SpO2 100%  Breastfeeding? No  BMI 19.91 kg/m2  Body mass index is 19.91 kg/(m^2).  GENERAL APPEARANCE: alert and no distress  RESP: lungs clear to auscultation - no rales, rhonchi or wheezes  CV: regular rates and rhythm, normal S1 S2, no S3 or S4 and no murmur, click or rub    Diagnostic test results:  See above     ASSESSMENT/PLAN:                                                        ICD-10-CM    1. Pulmonary nodules R91.8 RADIOLOGY REFERRAL    RLL on a CT of 8/17   2. Poor balance R26.89    3. Arthritis of knee M17.10     R   4. Paroxysmal atrial fibrillation (H) I48.0 flecainide (TAMBOCOR) 100 MG tablet   5. Hormone replacement therapy (postmenopausal) Z79.890 estradiol (FEMPATCH) 0.025 MG/24HR PTWK WK patch   6. Type 2 diabetes mellitus without complication, without long-term current use of insulin (H) E11.9    7. Benign essential hypertension I10 amLODIPine (NORVASC) 10 MG tablet     benazepril (LOTENSIN) 40 MG tablet   8. Mixed hyperlipidemia E78.2    9. Hypothyroidism, unspecified type E03.9 levothyroxine (SYNTHROID/LEVOTHROID) 50 MCG tablet          We reviewed multiple issues.  We also reviewed that at the time of her GI bleed last summer, and abdominal CT showed a very small right lung nodule, which needs follow-up with a CT " of the chest.             She reports this is the first she has heard of a lung nodule.                    We reviewed her lab results in detail, and I refilled her meds.                 She also reports her cardiologist in [place] has suggested the watchman procedure given her GI bleed last summer, and she is considering this.  Handicap parking application filled out.  Patient Instructions   Please call and schedule your chest /lung CT.                                               Please follow up in the fall, before you leave for [place].                                                   Rodger Razo MD  [facility]

## 2018-05-15 NOTE — PATIENT INSTRUCTIONS
Please call and schedule your chest /lung CT.                                               Please follow up in the fall, before you leave for [place].

## 2018-05-15 NOTE — MR AVS SNAPSHOT
After Visit Summary   5/15/2018    Lola Coto    MRN: [id number]           Patient Information     Date Of Birth          9/29/1934        Visit Information        Provider Department      5/15/2018 1:45 PM Rodger Razo MD [facility]        Today's Diagnoses     Pulmonary nodules    -  1    Poor balance        Arthritis of knee        Paroxysmal atrial fibrillation (H)        Hormone replacement therapy (postmenopausal)        Type 2 diabetes mellitus without complication, without long-term current use of insulin (H)        Benign essential hypertension        Mixed hyperlipidemia        Hypothyroidism, unspecified type        Essential hypertension with goal blood pressure less than 140/90          Care Instructions    Please call and schedule your chest /lung CT.                                               Please follow up in the fall, before you leave for [place].                                                       Follow-ups after your visit        Additional Services     RADIOLOGY REFERRAL       Your provider has referred you to: [facility] [phone]   [email or web address]                   PLEASE DO A CHEST CT NO CONTRAST ; F/U RLL NODULE, NOTED ON AN ABDOMINAL CT OF 8/17.   Please be aware that coverage of these services is subject to the terms and limitations of your health insurance plan.  Call member services at your health plan with any benefit or coverage questions.      Please bring the following to your appointment:    >>   Any x-rays, CTs or MRIs which have been performed.  Contact the facility where they were done to arrange for  prior to your scheduled appointment.    >>   List of current medications   >>   This referral request   >>   Any documents/labs given to you for this referral    Prior authorization is required for MRI/MRA, CT, Dexa Scans and Worker's Compensation cases.                  Who to contact   "   If you have questions or need follow up information about today's clinic visit or your schedule please contact [facility] directly at [phone].  Normal or non-critical lab and imaging results will be communicated to you by MyChart, letter or phone within 4 business days after the clinic has received the results. If you do not hear from us within 7 days, please contact the clinic through MyChart or phone. If you have a critical or abnormal lab result, we will notify you by phone as soon as possible.  Submit refill requests through [employer] or call your pharmacy and they will forward the refill request to us. Please allow 3 business days for your refill to be completed.          Additional Information About Your Visit        [facility] Information     [facility] lets you send messages to your doctor, view your test results, renew your prescriptions, schedule appointments and more. To sign up, go to [email or web address] . Click on \"Log in\" on the left side of the screen, which will take you to the Welcome page. Then click on \"Sign up Now\" on the right side of the page.     You will be asked to enter the access code listed below, as well as some personal information. Please follow the directions to create your username and password.     Your access code is: [email or web address]  Expires: 2018  2:29 PM     Your access code will  in 90 days. If you need help or a new code, please call your [facility] clinic or [phone].        Care EveryWhere ID     This is your Care EveryWhere ID. This could be used by other organizations to access your [facility] medical records  [id number]        Your Vitals Were     Pulse Temperature Respirations Height Last Period Pulse Oximetry    71 97.5  F (36.4  C) 20 5' 4\" (1.626 m) (LMP Unknown) 100%    Breastfeeding? BMI (Body Mass Index)                No 19.91 kg/m2           Blood Pressure from Last 3 Encounters:   05/15/18 110/70   10/10/17 120/72 "   09/29/17 130/82    Weight from Last 3 Encounters:   05/15/18 116 lb (52.6 kg)   10/10/17 115 lb (52.2 kg)   09/13/17 116 lb (52.6 kg)              We Performed the Following     RADIOLOGY REFERRAL          Today's Medication Changes          These changes are accurate as of 5/15/18  2:29 PM.  If you have any questions, ask your nurse or doctor.               These medicines have changed or have updated prescriptions.        Dose/Directions    estradiol 0.025 MG/24HR Ptwk WK patch   Commonly known as:  FEMPATCH   This may have changed:  additional instructions   Used for:  Hormone replacement therapy (postmenopausal)        UNWRAP AND APPLY 1 PATCH TRANSDERMALLY EVERY WEEK   Quantity:  12 patch   Refills:  3       fenofibrate 145 MG tablet   This may have changed:  See the new instructions.   Changed by:  Rodger Razo MD        Dose:  145 mg   Take 1 tablet (145 mg) by mouth daily   Quantity:  90 tablet   Refills:  3       flecainide 100 MG tablet   Commonly known as:  TAMBOCOR   This may have changed:  See the new instructions.   Used for:  Paroxysmal atrial fibrillation (H)   Changed by:  Rodger Razo MD        Dose:  100 mg   Take 1 tablet (100 mg) by mouth 2 times daily   Quantity:  180 tablet   Refills:  3       levothyroxine 50 MCG tablet   Commonly known as:  SYNTHROID/LEVOTHROID   This may have changed:  See the new instructions.   Used for:  Hypothyroidism, unspecified type   Changed by:  Rodger Razo MD        Dose:  50 mcg   Take 1 tablet (50 mcg) by mouth daily   Quantity:  90 tablet   Refills:  3         Stop taking these medicines if you haven't already. Please contact your care team if you have questions.     ferrous sulfate 325 (65 Fe) MG tablet   Commonly known as:  IRON   Stopped by:  Rodger Razo MD           metFORMIN 500 MG tablet   Commonly known as:  GLUCOPHAGE   Stopped by:  Rodger Razo MD                Where to get your medicines      Some of these will need a paper  prescription and others can be bought over the counter.  Ask your nurse if you have questions.     Bring a paper prescription for each of these medications     amLODIPine 10 MG tablet    atorvastatin 10 MG tablet    benazepril 40 MG tablet    estradiol 0.025 MG/24HR Ptwk WK patch    fenofibrate 145 MG tablet    flecainide 100 MG tablet    levothyroxine 50 MCG tablet                Primary Care Provider Office Phone # Fax #    Rodger Razo [phone] [address] [facility] [address]        Equal Access to Services     [facility]: Hadii aad ku hadasho Soomaali, waaxda luqadaha, qaybta kaalmada adeegyada, waxay idiin hayaan adeeg kharajavad lacarmina . So [facility] [phone].    ATENCIÓN: Si habla español, tiene a delacruz disposición servicios gratuitos de asistencia lingüística. Gasper al [phone].    We comply with applicable federal civil rights laws and [place] laws. We do not discriminate on the basis of race, color, national origin, age, disability, sex, sexual orientation, or gender identity.            Thank you!     Thank you for choosing [facility]  for your care. Our goal is always to provide you with excellent care. Hearing back from our patients is one way we can continue to improve our services. Please take a few minutes to complete the written survey that you may receive in the mail after your visit with us. Thank you!             Your Updated Medication List - Protect others around you: Learn how to safely use, store and throw away your medicines at www.disposemymeds.org.          This list is accurate as of 5/15/18  2:29 PM.  Always use your most recent med list.                   Brand Name Dispense Instructions for use Diagnosis    amLODIPine 10 MG tablet    NORVASC    90 tablet    Take 1 tablet (10 mg) by mouth daily    Benign essential hypertension       atorvastatin 10 MG tablet    LIPITOR    90 tablet    Take 1 tablet (10 mg) by  mouth daily    Essential hypertension with goal blood pressure less than 140/90       benazepril 40 MG tablet    LOTENSIN    90 tablet    Take 1 tablet (40 mg) by mouth daily    Benign essential hypertension       Calcium-Vitamin D 600-200 MG-UNIT Tabs      Take 1 tablet by mouth daily        CENTRUM SILVER PO      Take 1 tablet by mouth daily        estradiol 0.025 MG/24HR Ptwk WK patch    FEMPATCH    12 patch    UNWRAP AND APPLY 1 PATCH TRANSDERMALLY EVERY WEEK    Hormone replacement therapy (postmenopausal)       fenofibrate 145 MG tablet     90 tablet    Take 1 tablet (145 mg) by mouth daily        flecainide 100 MG tablet    TAMBOCOR    180 tablet    Take 1 tablet (100 mg) by mouth 2 times daily    Paroxysmal atrial fibrillation (H)       levothyroxine 50 MCG tablet    SYNTHROID/LEVOTHROID    90 tablet    Take 1 tablet (50 mcg) by mouth daily    Hypothyroidism, unspecified type       warfarin 2 MG tablet    COUMADIN    180 tablet    2 mg (2 mg x 1) on Mon, Fri; 4 mg (2 mg x 2) all other days    Paroxysmal atrial fibrillation (H)       WELCHOL 625 MG tablet   Generic drug:  colesevelam      Patient taking 3 tabs in morning, and 3 tabs at night

## 2018-07-11 ENCOUNTER — TRANSFERRED RECORDS (OUTPATIENT)
Dept: HEALTH INFORMATION MANAGEMENT | Facility: CLINIC | Age: 83
End: 2018-07-11

## 2018-07-12 ENCOUNTER — TRANSFERRED RECORDS (OUTPATIENT)
Dept: HEALTH INFORMATION MANAGEMENT | Facility: CLINIC | Age: 83
End: 2018-07-12

## 2018-08-03 DIAGNOSIS — I48.0 PAROXYSMAL ATRIAL FIBRILLATION (H): Chronic | ICD-10-CM

## 2018-08-03 NOTE — TELEPHONE ENCOUNTER
"Requested Prescriptions   Pending Prescriptions Disp Refills     warfarin (COUMADIN) 2 MG tablet [Pharmacy Med Name: WARFARIN SOD 2MG TABLETS (PURPLE)] 180 tablet 0      Last Written Prescription Date:  1/15/18  Last Fill Quantity: 180,  # refills: 1   Last office visit: 5/15/2018 with prescribing provider:     Future Office Visit:     Sig: TAKE 1 TABLET BY MOUTH ON MONDAYS AND FRIDAY AND 2 TABLETS ON ALL OTHER DAYS    Vitamin K Antagonists Failed    8/3/2018  9:38 AM       Failed - INR is within goal in the past 6 weeks    Confirm INR is within goal in the past 6 weeks.     Recent Labs   Lab Test 10/10/17   INR  2.4*                      Passed - Recent (12 mo) or future (30 days) visit within the authorizing provider's specialty    Patient had office visit in the last 12 months or has a visit in the next 30 days with authorizing provider or within the authorizing provider's specialty.  See \"Patient Info\" tab in inbasket, or \"Choose Columns\" in Meds & Orders section of the refill encounter.           Passed - Patient is 18 years of age or older       Passed - Patient is not pregnant       Passed - No positive pregnancy on file in past 12 months          "

## 2018-08-06 RX ORDER — WARFARIN SODIUM 2 MG/1
TABLET ORAL
Qty: 180 TABLET | Refills: 0 | Status: SHIPPED | OUTPATIENT
Start: 2018-08-06 | End: 2018-12-04

## 2018-08-11 DIAGNOSIS — E03.9 HYPOTHYROIDISM, UNSPECIFIED TYPE: ICD-10-CM

## 2018-08-13 NOTE — TELEPHONE ENCOUNTER
"Requested Prescriptions   Pending Prescriptions Disp Refills     levothyroxine (SYNTHROID/LEVOTHROID) 50 MCG tablet [Pharmacy Med Name: LEVOTHYROXINE 0.05MG (50MCG) TAB]  Last Written Prescription Date:  5/15/2018  Last Fill Quantity: 90,  # refills: 3   Last office visit: 5/15/2018 with prescribing provider:  Dr Razo   Future Office Visit:     90 tablet 0     Sig: TAKE 1 TABLET BY MOUTH ONCE DAILY    Thyroid Protocol Passed    8/11/2018 11:37 AM       Passed - Patient is 12 years or older       Passed - Recent (12 mo) or future (30 days) visit within the authorizing provider's specialty    Patient had office visit in the last 12 months or has a visit in the next 30 days with authorizing provider or within the authorizing provider's specialty.  See \"Patient Info\" tab in inbasket, or \"Choose Columns\" in Meds & Orders section of the refill encounter.           Passed - Normal TSH on file in past 12 months    Recent Labs   Lab Test  09/06/17   0600   TSH  1.23             Passed - No active pregnancy on record    If patient is pregnant or has had a positive pregnancy test, please check TSH.         Passed - No positive pregnancy test in past 12 months    If patient is pregnant or has had a positive pregnancy test, please check TSH.            "

## 2018-08-14 RX ORDER — LEVOTHYROXINE SODIUM 50 UG/1
TABLET ORAL
Qty: 90 TABLET | Refills: 0 | OUTPATIENT
Start: 2018-08-14

## 2018-12-04 DIAGNOSIS — I48.0 PAROXYSMAL ATRIAL FIBRILLATION (H): Chronic | ICD-10-CM

## 2018-12-04 NOTE — TELEPHONE ENCOUNTER
"Requested Prescriptions   Pending Prescriptions Disp Refills     warfarin (COUMADIN) 2 MG tablet [Pharmacy Med Name: WARFARIN SOD 2MG TABLETS (PURPLE)]  Last Written Prescription Date:  8/6/2018  Last Fill Quantity: 180,  # refills: 0   Last office visit: 5/15/2018 with prescribing provider:  Dr Razo   Future Office Visit:     180 tablet 0     Sig: TAKE 1 TABLET BY MOUTH ON MONDAYS AND FRIDAY AND 2 TABLETS ON ALL OTHER DAYS    Vitamin K Antagonists Failed    12/4/2018  3:12 PM       Failed - INR is within goal in the past 6 weeks    Confirm INR is within goal in the past 6 weeks.     Recent Labs   Lab Test 10/10/17   INR  2.4*                      Passed - Recent (12 mo) or future (30 days) visit within the authorizing provider's specialty    Patient had office visit in the last 12 months or has a visit in the next 30 days with authorizing provider or within the authorizing provider's specialty.  See \"Patient Info\" tab in inbasket, or \"Choose Columns\" in Meds & Orders section of the refill encounter.             Passed - Patient is 18 years of age or older       Passed - Patient is not pregnant       Passed - No positive pregnancy on file in past 12 months          "

## 2018-12-06 RX ORDER — WARFARIN SODIUM 2 MG/1
TABLET ORAL
Qty: 180 TABLET | Refills: 1 | Status: SHIPPED | OUTPATIENT
Start: 2018-12-06 | End: 2019-07-10

## 2019-03-26 ENCOUNTER — TRANSFERRED RECORDS (OUTPATIENT)
Dept: HEALTH INFORMATION MANAGEMENT | Facility: CLINIC | Age: 84
End: 2019-03-26

## 2019-06-12 DIAGNOSIS — E78.2 MIXED HYPERLIPIDEMIA: Primary | Chronic | ICD-10-CM

## 2019-06-12 NOTE — TELEPHONE ENCOUNTER
"Requested Prescriptions   Pending Prescriptions Disp Refills     atorvastatin (LIPITOR) 10 MG tablet  Last Written Prescription Date:  5/15/2018  Last Fill Quantity: 90 tablet,  # refills: 3   Last office visit: 5/15/2018 with prescribing provider:  Danni   Future Office Visit:     90 tablet 3     Sig: Take 1 tablet (10 mg) by mouth daily       Statins Protocol Failed - 6/12/2019  1:40 PM        Failed - LDL on file in past 12 months     Recent Labs   Lab Test 05/12/17  1047   LDL 58             Failed - Recent (12 mo) or future (30 days) visit within the authorizing provider's specialty     Patient had office visit in the last 12 months or has a visit in the next 30 days with authorizing provider or within the authorizing provider's specialty.  See \"Patient Info\" tab in inbasket, or \"Choose Columns\" in Meds & Orders section of the refill encounter.              Passed - No abnormal creatine kinase in past 12 months     No lab results found.             Passed - Medication is active on med list        Passed - Patient is age 18 or older        Passed - No active pregnancy on record        Passed - No positive pregnancy test in past 12 months           "

## 2019-06-14 RX ORDER — ATORVASTATIN CALCIUM 10 MG/1
10 TABLET, FILM COATED ORAL DAILY
Qty: 30 TABLET | Refills: 0 | Status: SHIPPED | OUTPATIENT
Start: 2019-06-14 | End: 2019-07-10

## 2019-06-14 NOTE — TELEPHONE ENCOUNTER
Medication is being filled for 1 time refill only due to:  Future labs ordered LDL.Annual appointment needed.

## 2019-06-25 DIAGNOSIS — I48.0 PAROXYSMAL ATRIAL FIBRILLATION (H): Chronic | ICD-10-CM

## 2019-06-25 RX ORDER — FLECAINIDE ACETATE 100 MG/1
TABLET ORAL
Qty: 60 TABLET | Refills: 0 | Status: SHIPPED | OUTPATIENT
Start: 2019-06-25 | End: 2019-06-26

## 2019-06-25 NOTE — TELEPHONE ENCOUNTER
A 30 day supply is given, patient is due for an office visit. Patient has appointment scheduled for 7/10/2019.    MARIANA Tesfaye, RN  [facility]

## 2019-06-25 NOTE — TELEPHONE ENCOUNTER
Requested Prescriptions   Pending Prescriptions Disp Refills     flecainide (TAMBOCOR) 100 MG tablet [Pharmacy Med Name: FLECAINIDE 100MG TABLETS]  Last Written Prescription Date:  6/29/2018  Last Fill Quantity: 180 tablet,  # refills: 0   Last office visit: 5/15/2018 with prescribing provider:  Danni   Future Office Visit:   Next 5 appointments (look out 90 days)    Jul 10, 2019  1:45 PM CDT  PHYSICAL with Rodger Razo MD  [facility] ([facility]) [address] [place] [id number]  [phone]          180 tablet 0     Sig: TAKE 1 TABLET BY MOUTH TWICE DAILY.       Anti Arrhythmic Agents Protocol Failed - 6/25/2019 10:06 AM        Failed - Lipid Panel on file in past year     Recent Labs   Lab Test 05/12/17  1047  09/30/15  1027   CHOL 162   < > 173   TRIG 85   < > 78   HDL 87   < > 96   LDL 58   < > 61   NHDL 75   < >  --    VLDL  --   --  16   CHOLHDLRATIO  --   --  1.8    < > = values in this interval not displayed.               Failed - CBC on file in past year     Recent Labs   Lab Test 10/10/17  1631   WBC 5.6   RBC 3.36*   HGB 10.0*   HCT 31.5*                    Failed - ALT on file in past year     Recent Labs   Lab Test 05/12/17  1047   ALT 25             Failed - Serum Creatinine on file in past year     Recent Labs   Lab Test 10/10/17  1631   CR 0.90             Failed - Medication needs approval from authorizing provider     Please forward this request to the authorizing provider for approval.           Failed - Serum Sodium on file in past year     Recent Labs   Lab Test 10/10/17  1631                 Failed - Serum Potassium on file in past year     Recent Labs   Lab Test 10/10/17  1631   POTASSIUM 3.9             Passed - Patient is 18 years of age or older        Passed - Patient is not pregnant        Passed - No positive pregnancy test on file in past year        Passed - Recent (6 mo) or  "future (30 days) visit with authorizing provider's specialty     Patient had office visit in the last 6 months or has a visit in the next 30 days with authorizing provider.  See \"Patient Info\" tab in inbasket, or \"Choose Columns\" in Meds & Orders section of the refill encounter.               "

## 2019-06-26 DIAGNOSIS — I48.0 PAROXYSMAL ATRIAL FIBRILLATION (H): Chronic | ICD-10-CM

## 2019-06-26 NOTE — TELEPHONE ENCOUNTER
Requested Prescriptions   Pending Prescriptions Disp Refills     flecainide (TAMBOCOR) 100 MG tablet [Pharmacy Med Name: FLECAINIDE 100MG TABLETS]  Last Written Prescription Date:  6/25/2019  Last Fill Quantity: 60 tablet,  # refills: 0   Last office visit: 5/15/2018 with prescribing provider:  Danni   Future Office Visit:   Next 5 appointments (look out 90 days)    Jul 10, 2019  1:45 PM CDT  PHYSICAL with Rodger Razo MD  [facility] ([facility]) [address] [place] [id number]  [phone]          180 tablet 0     Sig: TAKE 1 TABLET BY MOUTH TWICE DAILY.       Anti Arrhythmic Agents Protocol Failed - 6/26/2019  7:39 AM        Failed - Lipid Panel on file in past year     Recent Labs   Lab Test 05/12/17  1047  09/30/15  1027   CHOL 162   < > 173   TRIG 85   < > 78   HDL 87   < > 96   LDL 58   < > 61   NHDL 75   < >  --    VLDL  --   --  16   CHOLHDLRATIO  --   --  1.8    < > = values in this interval not displayed.               Failed - CBC on file in past year     Recent Labs   Lab Test 10/10/17  1631   WBC 5.6   RBC 3.36*   HGB 10.0*   HCT 31.5*                    Failed - ALT on file in past year     Recent Labs   Lab Test 05/12/17  1047   ALT 25             Failed - Serum Creatinine on file in past year     Recent Labs   Lab Test 10/10/17  1631   CR 0.90             Failed - Medication needs approval from authorizing provider     Please forward this request to the authorizing provider for approval.           Failed - Serum Sodium on file in past year     Recent Labs   Lab Test 10/10/17  1631                 Failed - Serum Potassium on file in past year     Recent Labs   Lab Test 10/10/17  1631   POTASSIUM 3.9             Passed - Patient is 18 years of age or older        Passed - Patient is not pregnant        Passed - No positive pregnancy test on file in past year        Passed - Recent (6 mo) or  "future (30 days) visit with authorizing provider's specialty     Patient had office visit in the last 6 months or has a visit in the next 30 days with authorizing provider.  See \"Patient Info\" tab in inbasket, or \"Choose Columns\" in Meds & Orders section of the refill encounter.               "

## 2019-06-27 RX ORDER — FLECAINIDE ACETATE 100 MG/1
TABLET ORAL
Qty: 180 TABLET | Refills: 0 | Status: SHIPPED | OUTPATIENT
Start: 2019-06-27 | End: 2019-07-10

## 2019-07-10 ENCOUNTER — OFFICE VISIT (OUTPATIENT)
Dept: FAMILY MEDICINE | Facility: CLINIC | Age: 84
End: 2019-07-10
Payer: MEDICARE

## 2019-07-10 VITALS
OXYGEN SATURATION: 97 % | WEIGHT: 115 LBS | HEART RATE: 68 BPM | SYSTOLIC BLOOD PRESSURE: 130 MMHG | HEIGHT: 64 IN | DIASTOLIC BLOOD PRESSURE: 70 MMHG | BODY MASS INDEX: 19.63 KG/M2 | TEMPERATURE: 97.4 F | RESPIRATION RATE: 20 BRPM

## 2019-07-10 DIAGNOSIS — Z00.00 ROUTINE HISTORY AND PHYSICAL EXAMINATION OF ADULT: Primary | ICD-10-CM

## 2019-07-10 DIAGNOSIS — E03.9 HYPOTHYROIDISM, UNSPECIFIED TYPE: ICD-10-CM

## 2019-07-10 DIAGNOSIS — R91.8 PULMONARY NODULES: ICD-10-CM

## 2019-07-10 DIAGNOSIS — I10 BENIGN ESSENTIAL HYPERTENSION: ICD-10-CM

## 2019-07-10 DIAGNOSIS — D64.9 ANEMIA, UNSPECIFIED TYPE: ICD-10-CM

## 2019-07-10 DIAGNOSIS — I48.0 PAROXYSMAL ATRIAL FIBRILLATION (H): ICD-10-CM

## 2019-07-10 DIAGNOSIS — E11.9 TYPE 2 DIABETES MELLITUS WITHOUT COMPLICATION, WITHOUT LONG-TERM CURRENT USE OF INSULIN (H): ICD-10-CM

## 2019-07-10 DIAGNOSIS — E78.2 MIXED HYPERLIPIDEMIA: ICD-10-CM

## 2019-07-10 LAB
BASOPHILS # BLD AUTO: 0 10E9/L (ref 0–0.2)
BASOPHILS NFR BLD AUTO: 0.5 %
DIFFERENTIAL METHOD BLD: NORMAL
EOSINOPHIL # BLD AUTO: 0.2 10E9/L (ref 0–0.7)
EOSINOPHIL NFR BLD AUTO: 1.9 %
ERYTHROCYTE [DISTWIDTH] IN BLOOD BY AUTOMATED COUNT: 14.9 % (ref 10–15)
HBA1C MFR BLD: 5.9 % (ref 0–5.6)
HCT VFR BLD AUTO: 38.3 % (ref 35–47)
HGB BLD-MCNC: 12.9 G/DL (ref 11.7–15.7)
LYMPHOCYTES # BLD AUTO: 1.8 10E9/L (ref 0.8–5.3)
LYMPHOCYTES NFR BLD AUTO: 22.7 %
MCH RBC QN AUTO: 30.1 PG (ref 26.5–33)
MCHC RBC AUTO-ENTMCNC: 33.7 G/DL (ref 31.5–36.5)
MCV RBC AUTO: 89 FL (ref 78–100)
MONOCYTES # BLD AUTO: 0.8 10E9/L (ref 0–1.3)
MONOCYTES NFR BLD AUTO: 9.8 %
NEUTROPHILS # BLD AUTO: 5.1 10E9/L (ref 1.6–8.3)
NEUTROPHILS NFR BLD AUTO: 65.1 %
PLATELET # BLD AUTO: 272 10E9/L (ref 150–450)
RBC # BLD AUTO: 4.29 10E12/L (ref 3.8–5.2)
WBC # BLD AUTO: 7.8 10E9/L (ref 4–11)

## 2019-07-10 PROCEDURE — 99214 OFFICE O/P EST MOD 30 MIN: CPT | Mod: 25 | Performed by: INTERNAL MEDICINE

## 2019-07-10 PROCEDURE — 99397 PER PM REEVAL EST PAT 65+ YR: CPT | Performed by: INTERNAL MEDICINE

## 2019-07-10 PROCEDURE — 36415 COLL VENOUS BLD VENIPUNCTURE: CPT | Performed by: INTERNAL MEDICINE

## 2019-07-10 PROCEDURE — 85025 COMPLETE CBC W/AUTO DIFF WBC: CPT | Performed by: INTERNAL MEDICINE

## 2019-07-10 PROCEDURE — 82728 ASSAY OF FERRITIN: CPT | Performed by: INTERNAL MEDICINE

## 2019-07-10 PROCEDURE — 83036 HEMOGLOBIN GLYCOSYLATED A1C: CPT | Performed by: INTERNAL MEDICINE

## 2019-07-10 PROCEDURE — 80053 COMPREHEN METABOLIC PANEL: CPT | Performed by: INTERNAL MEDICINE

## 2019-07-10 RX ORDER — BENAZEPRIL HYDROCHLORIDE 40 MG/1
40 TABLET ORAL DAILY
Qty: 90 TABLET | Refills: 3 | Status: SHIPPED | OUTPATIENT
Start: 2019-07-10 | End: 2020-06-11

## 2019-07-10 RX ORDER — ATORVASTATIN CALCIUM 10 MG/1
10 TABLET, FILM COATED ORAL DAILY
Qty: 90 TABLET | Refills: 3 | Status: SHIPPED | OUTPATIENT
Start: 2019-07-10 | End: 2020-06-14

## 2019-07-10 RX ORDER — FENOFIBRATE 145 MG/1
145 TABLET, COATED ORAL DAILY
Qty: 90 TABLET | Refills: 3 | Status: SHIPPED | OUTPATIENT
Start: 2019-07-10 | End: 2020-07-15

## 2019-07-10 RX ORDER — LEVOTHYROXINE SODIUM 50 UG/1
50 TABLET ORAL DAILY
Qty: 90 TABLET | Refills: 3 | Status: SHIPPED | OUTPATIENT
Start: 2019-07-10 | End: 2020-06-11

## 2019-07-10 RX ORDER — FLECAINIDE ACETATE 100 MG/1
100 TABLET ORAL 2 TIMES DAILY
Qty: 180 TABLET | Refills: 3 | Status: SHIPPED | OUTPATIENT
Start: 2019-07-10 | End: 2020-07-15

## 2019-07-10 RX ORDER — WARFARIN SODIUM 2 MG/1
TABLET ORAL
Qty: 180 TABLET | Refills: 3 | Status: SHIPPED | OUTPATIENT
Start: 2019-07-10 | End: 2020-07-15

## 2019-07-10 RX ORDER — AMLODIPINE BESYLATE 10 MG/1
10 TABLET ORAL DAILY
Qty: 90 TABLET | Refills: 3 | Status: SHIPPED | OUTPATIENT
Start: 2019-07-10 | End: 2020-06-11

## 2019-07-10 ASSESSMENT — ACTIVITIES OF DAILY LIVING (ADL): CURRENT_FUNCTION: NO ASSISTANCE NEEDED

## 2019-07-10 ASSESSMENT — MIFFLIN-ST. JEOR: SCORE: 956.64

## 2019-07-10 NOTE — LETTER
July 11, 2019      Lola Coto  [address] [place] [address]        Dear Ms.Nataliajeanie,    We are writing to inform you of your test results.             Your blood sugar control is very good.       The A1C of 5.9 correlates to an average blood sugar of approximately 117.             Your hemoglobin (red blood cells) is very good at 12.9.               The iron level (ferritin) is normal at 116.             The kidney and liver tests are normal.             Good results!    Results for orders placed or performed in visit on 07/10/19   Hemoglobin A1c   Result Value Ref Range    Hemoglobin A1C 5.9 (H) 0 - 5.6 %   Comprehensive metabolic panel (BMP + Alb, Alk Phos, ALT, AST, Total. Bili, TP)   Result Value Ref Range    Sodium 138 133 - 144 mmol/L    Potassium 3.8 3.4 - 5.3 mmol/L    Chloride 106 94 - 109 mmol/L    Carbon Dioxide 24 20 - 32 mmol/L    Anion Gap 8 3 - 14 mmol/L    Glucose 100 (H) 70 - 99 mg/dL    Urea Nitrogen 24 7 - 30 mg/dL    Creatinine 0.53 0.52 - 1.04 mg/dL    GFR Estimate 87 >60 mL/min/[1.73_m2]    GFR Estimate If Black >90 >60 mL/min/[1.73_m2]    Calcium 9.9 8.5 - 10.1 mg/dL    Bilirubin Total 0.4 0.2 - 1.3 mg/dL    Albumin 4.4 3.4 - 5.0 g/dL    Protein Total 7.9 6.8 - 8.8 g/dL    Alkaline Phosphatase 60 40 - 150 U/L    ALT 27 0 - 50 U/L    AST 17 0 - 45 U/L   Ferritin   Result Value Ref Range    Ferritin 116 8 - 252 ng/mL   CBC with platelets and differential   Result Value Ref Range    WBC 7.8 4.0 - 11.0 10e9/L    RBC Count 4.29 3.8 - 5.2 10e12/L    Hemoglobin 12.9 11.7 - 15.7 g/dL    Hematocrit 38.3 35.0 - 47.0 %    MCV 89 78 - 100 fl    MCH 30.1 26.5 - 33.0 pg    MCHC 33.7 31.5 - 36.5 g/dL    RDW 14.9 10.0 - 15.0 %    Platelet Count 272 150 - 450 10e9/L    % Neutrophils 65.1 %    % Lymphocytes 22.7 %    % Monocytes 9.8 %    % Eosinophils 1.9 %    % Basophils 0.5 %    Absolute Neutrophil 5.1 1.6 - 8.3 10e9/L    Absolute Lymphocytes 1.8 0.8 - 5.3 10e9/L    Absolute Monocytes 0.8 0.0 -  1.3 10e9/L    Absolute Eosinophils 0.2 0.0 - 0.7 10e9/L    Absolute Basophils 0.0 0.0 - 0.2 10e9/L    Diff Method Automated Method          If you have any questions or concerns, please call the clinic at the number listed above.       Sincerely,        Rodger Razo MD

## 2019-07-10 NOTE — LETTER
July 29, 2019      Lola Coto  [address] [place] [address]        Dear ,    We are writing to inform you of your test results.            Good results on your chest CT.            The tiny lung nodules are either unchanged, or no longer present.                                    If you have any questions or concerns, please call the clinic at the number listed above.       Sincerely,        Rodger Razo MD

## 2019-07-10 NOTE — PATIENT INSTRUCTIONS
Call [phone] to set up your chest CT.                       You are planning to have a mammogram.             I will let you know your lab results.                See your dermatologist regarding the lesion near your nose.                                                         Consider getting the shingles vaccine (Shingrix) at your pharmacy.

## 2019-07-10 NOTE — PROGRESS NOTES
"SUBJECTIVE:   Lola Coto is a 84 year old female who presents for Preventive Visit.      Are you in the first 12 months of your Medicare coverage?  No    Healthy Habits:     In general, how would you rate your overall health?  Good    Frequency of exercise:  None    Do you usually eat at least 4 servings of fruit and vegetables a day, include whole grains    & fiber and avoid regularly eating high fat or \"junk\" foods?  Yes    Taking medications regularly:  Yes    Barriers to taking medications:  None    Medication side effects:  None    Ability to successfully perform activities of daily living:  No assistance needed    Home Safety:  No safety concerns identified    Hearing Impairment:  Difficulty following a conversation in a noisy restaurant or crowded room and difficulty understanding soft or whispered speech    In the past 6 months, have you been bothered by leaking of urine? Yes    In general, how would you rate your overall mental or emotional health?  Good      PHQ-2 Total Score: 0    Additional concerns today:  Yes    Do you feel safe in your environment? Yes    Do you have a Health Care Directive? Yes: Advance Directive has been received and scanned.      Fall risk     1 fall within last year  Cognitive Screening   1) Repeat 3 items (Leader, Season, Table)    2) Clock draw: NORMAL  3) 3 item recall: Recalls 3 objects  Results: 3 items recalled: COGNITIVE IMPAIRMENT LESS LIKELY    Mini-CogTM Copyright S Ari. Licensed by the author for use in [facility]; reprinted with permission ([email or web address]). All rights reserved.      Do you have sleep apnea, excessive snoring or daytime drowsiness?: no    Reviewed and updated as needed this visit by clinical staff  Tobacco  Allergies  Meds  Med Hx  Surg Hx  Fam Hx  Soc Hx        Reviewed and updated as needed this visit by Provider        Social History     Tobacco Use     Smoking status: Never Smoker     Smokeless tobacco: Never Used "   Substance Use Topics     Alcohol use: Yes     Comment: wine night     If you drink alcohol do you typically have >3 drinks per day or >7 drinks per week? No    No flowsheet data found.    Last appt with me 14 months ago.             No hospitalizations.                 needs a TAVR.                               Poor balance.           R knee pain.               Had labs in [place];in March. She wants me to interpret the results. She never got a lab letter.                 Her hemoglobin was 11.8, electrolytes were normal, glucose 117, TSH 3, cholesterol 147, LDL 40, HDL 90, triglycerides 78. No A1c or liver enzymes were done.                                             Last INR 2.1 on 7/3/19.                Sees Cardiology in [facility].    everything was reported to be stable there.                       No problems with UTI or diverticulitis lately; takes a probiotic.                     Stopped using the ERT patch.                   GI orders Welchol; 8 tablets per day.          This helps a bit with diarrhea.      her medication list otherwise is accurate.                        We discussed having another chest CT..         She is agreeable.                                                      Her  will need aortic valve replacement.        Current providers sharing in care for this patient include:   Patient Care Team:  Rodger Razo MD as PCP - General (Internal Medicine)  Rodger Razo MD as Assigned PCP    The following health maintenance items are reviewed in Epic and correct as of today:  Health Maintenance   Topic Date Due     ADVANCE CARE PLANNING  09/29/1934     MEDICARE ANNUAL WELLNESS VISIT  09/29/1999     ZOSTER IMMUNIZATION (2 of 3) 12/02/2009     OP ANNUAL INR REFERRAL  04/19/2015     MICROALBUMIN  09/28/2017     A1C  03/06/2018     LIPID  05/12/2018     DIABETIC FOOT EXAM  05/12/2018     FALL RISK ASSESSMENT  09/13/2018     BMP  10/10/2018     PHQ-2  01/01/2019     EYE EXAM   07/12/2019     INFLUENZA VACCINE (1) 09/01/2019     TSH W/FREE T4 REFLEX  09/06/2019     DTAP/TDAP/TD IMMUNIZATION (3 - Td) 09/28/2023     DEXA  Completed     PNEUMOCOCCAL IMMUNIZATION 65+ LOW/MEDIUM RISK  Completed     IPV IMMUNIZATION  Aged Out     MENINGITIS IMMUNIZATION  Aged Out     Patient Active Problem List    Diagnosis Date Noted     Long-term (current) use of anticoagulants [Z79.01] 05/16/2017     Priority: High     Type 2 diabetes mellitus without complication (H) 05/20/2014     Priority: High     GIB (gastrointestinal bleeding) 05/09/2014     Priority: High     While in [place] 10/13; BRBPR; hgb down to 9.6;INR 3.6; transfused;  colonoscopy neg other than diverticulosis    hgb up to 11.7 in 4/14         Paroxysmal atrial fibrillation (H)      Priority: High     Ablation procedure 2008; NSR since with flecainide;           Echo in [facility] 4/15; no major problems       Poor balance 05/15/2018     Priority: Medium     Hypothyroidism, unspecified type 05/15/2018     Priority: Medium     Pulmonary nodules 05/15/2018     Priority: Medium     RLL on a CT of 8/17; stable on a CT of 7/18; new ANTONY 5 mm nodule       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/2017     Priority: Medium     R colectomy in 9/17       Benign essential hypertension 05/12/2017     Priority: Medium     Mixed hyperlipidemia 05/12/2017     Priority: Medium     First degree atrioventricular block 09/28/2016     Priority: Medium     PVC's (premature ventricular contractions) 09/28/2016     Priority: Medium     IVCD (intraventricular conduction defect) 09/28/2016     Priority: Medium     Bilateral inguinal hernia 05/09/2014     Priority: Medium     L > R;       Surgery 5/14       Hormone replacement therapy (postmenopausal) 05/09/2014     Priority: Medium     Chronic, low dose ( pt choice to continue long term)       Osteopenia 05/09/2014      Priority: Medium     Alendronate [id number]; long term ERT       Thyroid nodule 05/09/2014     Priority: Medium     See endo notes 2010; see US 2014; 5 nodules, all < 1 cm       Diverticulitis of colon 09/18/2013     Priority: Medium     Preventive measure 09/13/2013     Priority: Low     APRIMA DATA BASE UNDER THE 9/13/13 NOTE  Colonoscopy 10/13; neg  Mammogram 9/14, 9/15, 10/16, 10/17         Past Surgical History:   Procedure Laterality Date     COLECTOMY RIGHT Right 9/3/2017    Procedure: COLECTOMY RIGHT;  EXPLORATORY LAPAROTOMY, RIGHT TYESHA COLECTOMY;  Surgeon: Randy Caraballo MD;  Location: [facility] OR     COLONOSCOPY  10/2013    diverticulosis     COSMETIC SURGERY  1990    face lift     DENTAL SURGERY  1954    wisdom     HERNIORRHAPHY INGUINAL BILATERAL  5/23/2014    Procedure: HERNIORRHAPHY INGUINAL BILATERAL;  Surgeon: Christ Spain MD;  Location:  [facility]     HYSTERECTOMY  1980    endometriosis - abdominal took ovaries     ROTATOR CUFF REPAIR RT/LT  5/13    right; in [place]       BP Readings from Last 3 Encounters:   05/15/18 110/70   10/10/17 120/72   09/29/17 130/82    Wt Readings from Last 3 Encounters:   05/15/18 52.6 kg (116 lb)   10/10/17 52.2 kg (115 lb)   09/13/17 52.6 kg (116 lb)                  Current Outpatient Medications   Medication Sig Dispense Refill     amLODIPine (NORVASC) 10 MG tablet TAKE 1 TABLET BY MOUTH EVERY DAY 30 tablet 0     atorvastatin (LIPITOR) 10 MG tablet Take 1 tablet (10 mg) by mouth daily 30 tablet 0     benazepril (LOTENSIN) 40 MG tablet TAKE 1 TABLET BY MOUTH DAILY 30 tablet 0     Calcium-Vitamin D 600-200 MG-UNIT TABS Take 1 tablet by mouth daily        colesevelam (WELCHOL) 625 MG tablet Patient taking 3 tabs in morning, and 3 tabs at night       fenofibrate 145 MG tablet Take 1 tablet (145 mg) by mouth daily 90 tablet 3     flecainide (TAMBOCOR) 100 MG tablet TAKE 1 TABLET BY MOUTH TWICE DAILY. 180 tablet 0     levothyroxine (SYNTHROID/LEVOTHROID) 50 MCG  "tablet Take 1 tablet (50 mcg) by mouth daily 90 tablet 3     Multiple Vitamins-Minerals (CENTRUM SILVER PO) Take 1 tablet by mouth daily        warfarin (COUMADIN) 2 MG tablet TAKE 1 TABLET BY MOUTH ON MONDAYS AND FRIDAY AND 2 TABLETS ON ALL OTHER DAYS 180 tablet 1     estradiol (FEMPATCH) 0.025 MG/24HR PTWK WK patch UNWRAP AND APPLY 1 PATCH TRANSDERMALLY EVERY WEEK (Patient not taking: Reported on 7/10/2019) 12 patch 3         Review of Systemssee above plus  CONSTITUTIONAL: NEGATIVE for fever, chills, change in weight  INTEGUMENTARY/SKIN: NEGATIVE for worrisome rashes, moles or lesions  EYES: NEGATIVE for vision changes or irritation  ENT/MOUTH: NEGATIVE for ear, mouth and throat problems  RESP: NEGATIVE for significant cough or SOB  BREAST: NEGATIVE for masses, tenderness or discharge  CV: NEGATIVE for chest pain, palpitations or peripheral edema  GI: NEGATIVE for nausea, abdominal pain, heartburn, or change in bowel habits  : NEGATIVE for frequency, dysuria, or hematuria  NEURO: balance is poor  ENDOCRINE: NEGATIVE for temperature intolerance, skin/hair changes  HEME: NEGATIVE for bleeding problems  PSYCHIATRIC: NEGATIVE for changes in mood or affect    OBJECTIVE:   /70 (BP Location: Right arm, Patient Position: Chair, Cuff Size: Adult Regular)   Pulse 68   Temp 97.4  F (36.3  C)   Resp 20   Ht 1.626 m (5' 4\")   Wt 52.2 kg (115 lb)   LMP  (LMP Unknown)   SpO2 97%   Breastfeeding? No   BMI 19.74 kg/m   Estimated body mass index is 19.91 kg/m  as calculated from the following:    Height as of this encounter: 1.626 m (5' 4\").    Weight as of 5/15/18: 52.6 kg (116 lb).  Physical Exam  GENERAL APPEARANCE: alert, no distress, elderly and frail  EYES: Eyes grossly normal to inspection  HENT: ear canals and TM's normal, nose and mouth without ulcers or lesions, oropharynx clear and oral mucous membranes moist  NECK: no adenopathy, no asymmetry, masses, or scars and thyroid normal to palpation  RESP: " lungs clear to auscultation - no rales, rhonchi or wheezes  BREAST: normal without masses, tenderness or nipple discharge and no palpable axillary masses or adenopathy  CV: regular rate and rhythm, normal S1 S2, no S3 or S4, no murmur, click or rub, no peripheral edema and peripheral pulses strong  ABDOMEN: soft, nontender, no hepatosplenomegaly and no masses  MS: arthritic right knee  SKIN: There is a raised lesion between her left eye and her nose  NEURO: Normal strength and tone, mentation intact and speech normal  PSYCH: mentation appears normal and affect normal/bright    Diagnostic Test Results:  pending    ASSESSMENT / PLAN:   Lola was seen today for physical.    Diagnoses and all orders for this visit:    Routine history and physical examination of adult    Pulmonary nodules  -     RADIOLOGY REFERRAL    Benign essential hypertension  -     amLODIPine (NORVASC) 10 MG tablet; Take 1 tablet (10 mg) by mouth daily  -     benazepril (LOTENSIN) 40 MG tablet; Take 1 tablet (40 mg) by mouth daily    Paroxysmal atrial fibrillation (H)  -     warfarin (COUMADIN) 2 MG tablet; TAKE 1 TABLET BY MOUTH ON MONDAYS AND FRIDAY AND 2 TABLETS ON ALL OTHER DAYS  -     flecainide (TAMBOCOR) 100 MG tablet; Take 1 tablet (100 mg) by mouth 2 times daily    Mixed hyperlipidemia  -     atorvastatin (LIPITOR) 10 MG tablet; Take 1 tablet (10 mg) by mouth daily  -     fenofibrate (TRICOR) 145 MG tablet; Take 1 tablet (145 mg) by mouth daily    Type 2 diabetes mellitus without complication, without long-term current use of insulin (H)  -     Hemoglobin A1c  -     Comprehensive metabolic panel (BMP + Alb, Alk Phos, ALT, AST, Total. Bili, TP)    Hypothyroidism, unspecified type  -     levothyroxine (SYNTHROID/LEVOTHROID) 50 MCG tablet; Take 1 tablet (50 mcg) by mouth daily    Anemia, unspecified type  -     Ferritin  -     CBC with platelets and differential    Summary and implications:  We reviewed multiple issues.           We reviewed  "all of the issues on the diagnoses list.                      In many aspects, her health appears stable.            Check labs and adjust medications as indicated.  Chest CT is planned.   I reviewed and renewed her medications.      He has a dermatologist whom she plans to see.              Patient Instructions   Call [phone] to set up your chest CT.                       You are planning to have a mammogram.             I will let you know your lab results.                See your dermatologist regarding the lesion near your nose.                                                         Consider getting the shingles vaccine (Shingrix) at your pharmacy.                                                       Return in about 6 months (around 1/10/2020) for follow up of several issues.      End of Life Planning:  Patient currently has an advanced directive: Yes.  Practitioner is supportive of decision.    COUNSELING:  Reviewed preventive health counseling, as reflected in patient instructions  Special attention given to:       Regular exercise       Healthy diet/nutrition    Estimated body mass index is 19.91 kg/m  as calculated from the following:    Height as of this encounter: 1.626 m (5' 4\").    Weight as of 5/15/18: 52.6 kg (116 lb).    Weight management plan noted, stable and monitoring     reports that she has never smoked. She has never used smokeless tobacco.      Appropriate preventive services were discussed with this patient, including applicable screening as appropriate for cardiovascular disease, diabetes, osteopenia/osteoporosis, and glaucoma.  As appropriate for age/gender, discussed screening for colorectal cancer, prostate cancer, breast cancer, and cervical cancer. Checklist reviewing preventive services available has been given to the patient.    Reviewed patients plan of care and provided an AVS. The Basic Care Plan (routine screening as documented in Health Maintenance) for Lola meets the " Care Plan requirement. This Care Plan has been established and reviewed with the Patient.    Counseling Resources:  ATP IV Guidelines  Pooled Cohorts Equation Calculator  Breast Cancer Risk Calculator  FRAX Risk Assessment  ICSI Preventive Guidelines  Dietary Guidelines for Americans, 2010  USDA's MyPlate  ASA Prophylaxis  Lung CA Screening    Rodger Razo MD  [facility]    Results for orders placed or performed in visit on 07/10/19   Hemoglobin A1c   Result Value Ref Range    Hemoglobin A1C 5.9 (H) 0 - 5.6 %   Comprehensive metabolic panel (BMP + Alb, Alk Phos, ALT, AST, Total. Bili, TP)   Result Value Ref Range    Sodium 138 133 - 144 mmol/L    Potassium 3.8 3.4 - 5.3 mmol/L    Chloride 106 94 - 109 mmol/L    Carbon Dioxide 24 20 - 32 mmol/L    Anion Gap 8 3 - 14 mmol/L    Glucose 100 (H) 70 - 99 mg/dL    Urea Nitrogen 24 7 - 30 mg/dL    Creatinine 0.53 0.52 - 1.04 mg/dL    GFR Estimate 87 >60 mL/min/[1.73_m2]    GFR Estimate If Black >90 >60 mL/min/[1.73_m2]    Calcium 9.9 8.5 - 10.1 mg/dL    Bilirubin Total 0.4 0.2 - 1.3 mg/dL    Albumin 4.4 3.4 - 5.0 g/dL    Protein Total 7.9 6.8 - 8.8 g/dL    Alkaline Phosphatase 60 40 - 150 U/L    ALT 27 0 - 50 U/L    AST 17 0 - 45 U/L   Ferritin   Result Value Ref Range    Ferritin 116 8 - 252 ng/mL   CBC with platelets and differential   Result Value Ref Range    WBC 7.8 4.0 - 11.0 10e9/L    RBC Count 4.29 3.8 - 5.2 10e12/L    Hemoglobin 12.9 11.7 - 15.7 g/dL    Hematocrit 38.3 35.0 - 47.0 %    MCV 89 78 - 100 fl    MCH 30.1 26.5 - 33.0 pg    MCHC 33.7 31.5 - 36.5 g/dL    RDW 14.9 10.0 - 15.0 %    Platelet Count 272 150 - 450 10e9/L    % Neutrophils 65.1 %    % Lymphocytes 22.7 %    % Monocytes 9.8 %    % Eosinophils 1.9 %    % Basophils 0.5 %    Absolute Neutrophil 5.1 1.6 - 8.3 10e9/L    Absolute Lymphocytes 1.8 0.8 - 5.3 10e9/L    Absolute Monocytes 0.8 0.0 - 1.3 10e9/L    Absolute Eosinophils 0.2 0.0 - 0.7 10e9/L    Absolute Basophils  0.0 0.0 - 0.2 10e9/L    Diff Method Automated Method      Letter sent.                                   Your blood sugar control is very good.       The A1C of 5.9 correlates to an average blood sugar of approximately 117.             Your hemoglobin (red blood cells) is very good at 12.9.               The iron level (ferritin) is normal at 116.             The kidney and liver tests are normal.             Good results!                                                Addendum: CT : 2 R tiny R lung nodules are unchanged.        Tiny ANTONY nodule not seen.        Letter sent.

## 2019-07-11 LAB
ALBUMIN SERPL-MCNC: 4.4 G/DL (ref 3.4–5)
ALP SERPL-CCNC: 60 U/L (ref 40–150)
ALT SERPL W P-5'-P-CCNC: 27 U/L (ref 0–50)
ANION GAP SERPL CALCULATED.3IONS-SCNC: 8 MMOL/L (ref 3–14)
AST SERPL W P-5'-P-CCNC: 17 U/L (ref 0–45)
BILIRUB SERPL-MCNC: 0.4 MG/DL (ref 0.2–1.3)
BUN SERPL-MCNC: 24 MG/DL (ref 7–30)
CALCIUM SERPL-MCNC: 9.9 MG/DL (ref 8.5–10.1)
CHLORIDE SERPL-SCNC: 106 MMOL/L (ref 94–109)
CO2 SERPL-SCNC: 24 MMOL/L (ref 20–32)
CREAT SERPL-MCNC: 0.53 MG/DL (ref 0.52–1.04)
FERRITIN SERPL-MCNC: 116 NG/ML (ref 8–252)
GFR SERPL CREATININE-BSD FRML MDRD: 87 ML/MIN/{1.73_M2}
GLUCOSE SERPL-MCNC: 100 MG/DL (ref 70–99)
POTASSIUM SERPL-SCNC: 3.8 MMOL/L (ref 3.4–5.3)
PROT SERPL-MCNC: 7.9 G/DL (ref 6.8–8.8)
SODIUM SERPL-SCNC: 138 MMOL/L (ref 133–144)

## 2019-07-22 ENCOUNTER — TRANSFERRED RECORDS (OUTPATIENT)
Dept: HEALTH INFORMATION MANAGEMENT | Facility: CLINIC | Age: 84
End: 2019-07-22

## 2020-02-24 NOTE — PATIENT INSTRUCTIONS
I will let you know your lab results.    Have a good, safe winter.                Let's plan to see you in May 2018.            24-Feb-2020 22:50

## 2020-06-11 DIAGNOSIS — E03.9 HYPOTHYROIDISM, UNSPECIFIED TYPE: ICD-10-CM

## 2020-06-11 DIAGNOSIS — I10 BENIGN ESSENTIAL HYPERTENSION: ICD-10-CM

## 2020-06-11 RX ORDER — BENAZEPRIL HYDROCHLORIDE 40 MG/1
TABLET ORAL
Qty: 90 TABLET | Refills: 3 | Status: SHIPPED | OUTPATIENT
Start: 2020-06-11 | End: 2021-06-30

## 2020-06-11 RX ORDER — LEVOTHYROXINE SODIUM 50 UG/1
TABLET ORAL
Qty: 90 TABLET | Refills: 3 | Status: SHIPPED | OUTPATIENT
Start: 2020-06-11 | End: 2021-06-30

## 2020-06-11 RX ORDER — AMLODIPINE BESYLATE 10 MG/1
TABLET ORAL
Qty: 90 TABLET | Refills: 3 | Status: SHIPPED | OUTPATIENT
Start: 2020-06-11 | End: 2021-06-30

## 2020-07-14 DIAGNOSIS — I48.0 PAROXYSMAL ATRIAL FIBRILLATION (H): ICD-10-CM

## 2020-07-14 RX ORDER — FLECAINIDE ACETATE 100 MG/1
TABLET ORAL
Qty: 180 TABLET | Refills: 3 | OUTPATIENT
Start: 2020-07-14

## 2020-07-15 ENCOUNTER — VIRTUAL VISIT (OUTPATIENT)
Dept: FAMILY MEDICINE | Facility: CLINIC | Age: 85
End: 2020-07-15
Payer: MEDICARE

## 2020-07-15 DIAGNOSIS — E78.2 MIXED HYPERLIPIDEMIA: ICD-10-CM

## 2020-07-15 DIAGNOSIS — E03.9 HYPOTHYROIDISM, UNSPECIFIED TYPE: ICD-10-CM

## 2020-07-15 DIAGNOSIS — Z79.01 LONG TERM CURRENT USE OF ANTICOAGULANT THERAPY: Chronic | ICD-10-CM

## 2020-07-15 DIAGNOSIS — I49.3 PVC'S (PREMATURE VENTRICULAR CONTRACTIONS): ICD-10-CM

## 2020-07-15 DIAGNOSIS — E11.9 TYPE 2 DIABETES MELLITUS WITHOUT COMPLICATION, WITHOUT LONG-TERM CURRENT USE OF INSULIN (H): Primary | ICD-10-CM

## 2020-07-15 DIAGNOSIS — I48.0 PAROXYSMAL ATRIAL FIBRILLATION (H): ICD-10-CM

## 2020-07-15 PROCEDURE — 99442 ZZC PHYSICIAN TELEPHONE EVALUATION 11-20 MIN: CPT | Performed by: INTERNAL MEDICINE

## 2020-07-15 RX ORDER — FENOFIBRATE 145 MG/1
145 TABLET, COATED ORAL DAILY
Qty: 90 TABLET | Refills: 3 | Status: SHIPPED | OUTPATIENT
Start: 2020-07-15 | End: 2021-07-05

## 2020-07-15 RX ORDER — WARFARIN SODIUM 2 MG/1
TABLET ORAL
Qty: 180 TABLET | Refills: 3 | Status: SHIPPED | OUTPATIENT
Start: 2020-07-15 | End: 2020-10-15

## 2020-07-15 RX ORDER — FLECAINIDE ACETATE 100 MG/1
100 TABLET ORAL 2 TIMES DAILY
Qty: 180 TABLET | Refills: 3 | Status: SHIPPED | OUTPATIENT
Start: 2020-07-15 | End: 2021-07-12

## 2020-07-15 NOTE — LETTER
August 12, 2020      Lola Coto  [address] [place] [address]        Dear ,    We are writing to inform you of your test results.           At this time, the urine culture is pending.                     Your blood results are good.           The thyroid level is good. Keep taking the same dosage.     Your blood sugar control is good.       The A1C of 5.9 correlates to an average blood sugar of approximately 117.                   Your other lab results are normal ,including the liver,kidney, potassium, and cholesterol profile.    Recent Results (from the past 72 hour(s))   Lipid Profile (Chol, Trig, HDL, LDL calc)    Collection Time: 08/12/20  9:14 AM   Result Value Ref Range    Cholesterol 168 <200 mg/dL    Triglycerides 82 <150 mg/dL    HDL Cholesterol 96 >49 mg/dL    LDL Cholesterol Calculated 56 <100 mg/dL    Non HDL Cholesterol 72 <130 mg/dL   CBC with platelets and differential    Collection Time: 08/12/20  9:14 AM   Result Value Ref Range    WBC 7.2 4.0 - 11.0 10e9/L    RBC Count 4.29 3.8 - 5.2 10e12/L    Hemoglobin 12.6 11.7 - 15.7 g/dL    Hematocrit 39.1 35.0 - 47.0 %    MCV 91 78 - 100 fl    MCH 29.4 26.5 - 33.0 pg    MCHC 32.2 31.5 - 36.5 g/dL    RDW 15.3 (H) 10.0 - 15.0 %    Platelet Count 269 150 - 450 10e9/L    % Neutrophils 70.6 %    % Lymphocytes 17.8 %    % Monocytes 10.1 %    % Eosinophils 1.2 %    % Basophils 0.3 %    Absolute Neutrophil 5.1 1.6 - 8.3 10e9/L    Absolute Lymphocytes 1.3 0.8 - 5.3 10e9/L    Absolute Monocytes 0.7 0.0 - 1.3 10e9/L    Absolute Eosinophils 0.1 0.0 - 0.7 10e9/L    Absolute Basophils 0.0 0.0 - 0.2 10e9/L    Diff Method Automated Method    TSH with free T4 reflex    Collection Time: 08/12/20  9:14 AM   Result Value Ref Range    TSH 4.05 (H) 0.40 - 4.00 mU/L   Hemoglobin A1c    Collection Time: 08/12/20  9:14 AM   Result Value Ref Range    Hemoglobin A1C 5.9 (H) 0 - 5.6 %   Comprehensive metabolic panel (BMP + Alb, Alk Phos, ALT, AST, Total. Bili,  TP)    Collection Time: 08/12/20  9:14 AM   Result Value Ref Range    Sodium 138 133 - 144 mmol/L    Potassium 4.2 3.4 - 5.3 mmol/L    Chloride 108 94 - 109 mmol/L    Carbon Dioxide 24 20 - 32 mmol/L    Anion Gap 6 3 - 14 mmol/L    Glucose 113 (H) 70 - 99 mg/dL    Urea Nitrogen 23 7 - 30 mg/dL    Creatinine 0.57 0.52 - 1.04 mg/dL    GFR Estimate 84 >60 mL/min/[1.73_m2]    GFR Estimate If Black >90 >60 mL/min/[1.73_m2]    Calcium 9.6 8.5 - 10.1 mg/dL    Bilirubin Total 0.4 0.2 - 1.3 mg/dL    Albumin 3.8 3.4 - 5.0 g/dL    Protein Total 7.7 6.8 - 8.8 g/dL    Alkaline Phosphatase 53 40 - 150 U/L    ALT 22 0 - 50 U/L    AST 16 0 - 45 U/L   T4 free    Collection Time: 08/12/20  9:14 AM   Result Value Ref Range    T4 Free 1.11 0.76 - 1.46 ng/dL   UA reflex to Microscopic    Collection Time: 08/12/20  9:24 AM   Result Value Ref Range    Color Urine Yellow     Appearance Urine Slightly Cloudy     Glucose Urine Negative NEG^Negative mg/dL    Bilirubin Urine Negative NEG^Negative    Ketones Urine Negative NEG^Negative mg/dL    Specific Gravity Urine 1.025 1.003 - 1.035    Blood Urine Negative NEG^Negative    pH Urine 5.5 5.0 - 7.0 pH    Protein Albumin Urine Negative NEG^Negative mg/dL    Urobilinogen Urine 0.2 0.2 - 1.0 EU/dL    Nitrite Urine Positive (A) NEG^Negative    Leukocyte Esterase Urine Trace (A) NEG^Negative    Source Midstream Urine    Urine Microscopic    Collection Time: 08/12/20  9:24 AM   Result Value Ref Range    WBC Urine 0 - 5 OTO5^0 - 5 /HPF    RBC Urine O - 2 OTO2^O - 2 /HPF    Squamous Epithelial /LPF Urine Moderate (A) FEW^Few /LPF    Bacteria Urine Many (A) NEG^Negative /HPF         If you have any questions or concerns, please call the clinic at the number listed above.       Sincerely,        Rodger Razo MD

## 2020-07-15 NOTE — PROGRESS NOTES
"Lola Coto is a 85 year old female who is being evaluated via a billable telephone visit.      The patient has been notified of following:     \"This telephone visit will be conducted via a call between you and your physician/provider. We have found that certain health care needs can be provided without the need for a physical exam.  This service lets us provide the care you need with a short phone conversation.  If a prescription is necessary we can send it directly to your pharmacy.  If lab work is needed we can place an order for that and you can then stop by our lab to have the test done at a later time.    Telephone visits are billed at different rates depending on your insurance coverage. During this emergency period, for some insurers they may be billed the same as an in-person visit.  Please reach out to your insurance provider with any questions.    If during the course of the call the physician/provider feels a telephone visit is not appropriate, you will not be charged for this service.\"    Patient has given verbal consent for Telephone visit?  Yes    What phone number would you like to be contacted at? [phone]    How would you like to obtain your AVS? Mail a copy    Subjective     Lola Coto is a 85 year old female who presents via phone visit today for the following health issues:    HPI    Medication Followup of flecainide    Taking Medication as prescribed: yes    Side Effects:  None    Medication Helping Symptoms:  yes        TKA was cancelled.           Rescheduled for 11/20 in [facility].                                          had valve surgery and then a small stroke.             They are traveling back and forth from their [place] to [place] and [facility].               She had a pre-op in [facility], with lab work.                She did not get a print out of her lab results,so she does not know what was checked.                                 Patient Active Problem List    Diagnosis " Date Noted     Long-term (current) use of anticoagulants [Z79.01] 05/16/2017     Priority: High     Type 2 diabetes mellitus without complication (H) 05/20/2014     Priority: High     GIB (gastrointestinal bleeding) 05/09/2014     Priority: High     While in [facility] 10/13; BRBPR; hgb down to 9.6;INR 3.6; transfused;  colonoscopy neg other than diverticulosis    hgb up to 11.7 in 4/14         Paroxysmal atrial fibrillation (H)      Priority: High     Ablation procedure 2008; NSR since with flecainide;           Echo in [place] 4/15; no major problems       Anemia, unspecified type 07/10/2019     Priority: Medium     Poor balance 05/15/2018     Priority: Medium     Hypothyroidism, unspecified type 05/15/2018     Priority: Medium     Pulmonary nodules 05/15/2018     Priority: Medium     RLL on a CT of 8/17; stable on a CT of 7/18; new ANTONY 5 mm nodule.           In 7/19 ANTONY nodule is no longer seen; R lung nodules are stable.        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/2017     Priority: Medium     R colectomy in 9/17       Benign essential hypertension 05/12/2017     Priority: Medium     Mixed hyperlipidemia 05/12/2017     Priority: Medium     First degree atrioventricular block 09/28/2016     Priority: Medium     PVC's (premature ventricular contractions) 09/28/2016     Priority: Medium     IVCD (intraventricular conduction defect) 09/28/2016     Priority: Medium     Bilateral inguinal hernia 05/09/2014     Priority: Medium     L > R;       Surgery 5/14       Hormone replacement therapy (postmenopausal) 05/09/2014     Priority: Medium     Chronic, low dose ( pt choice to continue long term)       Osteopenia 05/09/2014     Priority: Medium     Alendronate [id number]; long term ERT       Thyroid nodule 05/09/2014     Priority: Medium     See endo notes 2010; see US 2014; 5 nodules, all < 1 cm        Diverticulitis of colon 09/18/2013     Priority: Medium     Preventive measure 09/13/2013     Priority: Low     APRIMA DATA BASE UNDER THE 9/13/13 NOTE  Colonoscopy 10/13; neg  Mammogram 9/14, 9/15, 10/16, 10/17         Past Surgical History:   Procedure Laterality Date     COLECTOMY RIGHT Right 9/3/2017    Procedure: COLECTOMY RIGHT;  EXPLORATORY LAPAROTOMY, RIGHT TYESHA COLECTOMY;  Surgeon: Randy Caraballo MD;  Location: [facility] OR     COLONOSCOPY  10/2013    diverticulosis     COSMETIC SURGERY  1990    face lift     DENTAL SURGERY  1954    wisdom     HERNIORRHAPHY INGUINAL BILATERAL  5/23/2014    Procedure: HERNIORRHAPHY INGUINAL BILATERAL;  Surgeon: Christ Spain MD;  Location:  [facility]     HYSTERECTOMY  1980    endometriosis - abdominal took ovaries     ROTATOR CUFF REPAIR RT/LT  5/13    right; in [place]       Current Outpatient Medications   Medication Sig Dispense Refill     amLODIPine (NORVASC) 10 MG tablet TAKE 1 TABLET(10 MG) BY MOUTH DAILY 90 tablet 3     atorvastatin (LIPITOR) 10 MG tablet TAKE 1 TABLET(10 MG) BY MOUTH DAILY 90 tablet 3     benazepril (LOTENSIN) 40 MG tablet TAKE 1 TABLET(40 MG) BY MOUTH DAILY 90 tablet 3     Calcium-Vitamin D 600-200 MG-UNIT TABS Take 1 tablet by mouth daily        colesevelam (WELCHOL) 625 MG tablet Patient taking 3 tabs in morning, and 3 tabs at night       fenofibrate (TRICOR) 145 MG tablet Take 1 tablet (145 mg) by mouth daily 90 tablet 3     flecainide (TAMBOCOR) 100 MG tablet Take 1 tablet (100 mg) by mouth 2 times daily 180 tablet 3     levothyroxine (SYNTHROID/LEVOTHROID) 50 MCG tablet TAKE 1 TABLET(50 MCG) BY MOUTH DAILY 90 tablet 3     Multiple Vitamins-Minerals (CENTRUM SILVER PO) Take 1 tablet by mouth daily        warfarin (COUMADIN) 2 MG tablet TAKE 1 TABLET BY MOUTH ON MONDAYS AND FRIDAY AND 2 TABLETS ON ALL OTHER DAYS 180 tablet 3     Allergies   Allergen Reactions     Sulfa Drugs Anaphylaxis     Bactrim [Sulfamethoxazole W-Trimethoprim] Hives      Erythromycin GI Disturbance     Monistat [Miconazole] Dermatitis     Cream caused perineal reaction      Nsaids GI Disturbance     Zofran [Ondansetron] Other (See Comments) and Nausea     Causes severe headache     BP Readings from Last 3 Encounters:   07/10/19 130/70   05/15/18 110/70   10/10/17 120/72    Wt Readings from Last 3 Encounters:   07/10/19 52.2 kg (115 lb)   05/15/18 52.6 kg (116 lb)   10/10/17 52.2 kg (115 lb)                    Reviewed and updated as needed this visit by Provider         Review of Systems   CONSTITUTIONAL:NEGATIVE for fever, chills, change in weight  RESP:NEGATIVE for significant cough or SOB  CV: NEGATIVE for chest pain, palpitations or peripheral edema       Objective   Reported vitals:  LMP  (LMP Unknown)      PSYCH: Alert and oriented times 3; coherent speech, normal   rate and volume, able to articulate logical thoughts, able   to abstract reason, no tangential thoughts, no hallucinations   or delusions  Her affect is normal and pleasant  RESP: No cough, no audible wheezing, able to talk in full sentences  Remainder of exam unable to be completed due to telephone visits    Diagnostic Test Results:  none         Assessment/Plan:    1. Type 2 diabetes mellitus without complication, without long-term current use of insulin (H)  RTC for labs    2. Paroxysmal atrial fibrillation (H)  Continue antiarrhythmic and anticoagulation  - flecainide (TAMBOCOR) 100 MG tablet; Take 1 tablet (100 mg) by mouth 2 times daily  Dispense: 180 tablet; Refill: 3  - warfarin ANTICOAGULANT (COUMADIN) 2 MG tablet; 2 mg per day except on  M and F, take  3mg  Dispense: 180 tablet; Refill: 3    3. Hypothyroidism, unspecified type  Check labs and adjust medications as indicated.      4. Mixed hyperlipidemia  Check labs and adjust medications as indicated.    - fenofibrate (TRICOR) 145 MG tablet; Take 1 tablet (145 mg) by mouth daily  Dispense: 90 tablet; Refill: 3    5. Long-term (current) use of  anticoagulants [Z79.01]  Continue warfarin    6. PVC's (premature ventricular contractions)        Return in about 1 day (around 7/16/2020) for lab only visit; fasting.      Phone call duration:  15 minutes    Rodger Razo MD      Addendum:    She return for lab work.  She has urinary symptoms.  The urine culture is pending at this time.     Recent Results (from the past 72 hour(s))   Lipid Profile (Chol, Trig, HDL, LDL calc)    Collection Time: 08/12/20  9:14 AM   Result Value Ref Range    Cholesterol 168 <200 mg/dL    Triglycerides 82 <150 mg/dL    HDL Cholesterol 96 >49 mg/dL    LDL Cholesterol Calculated 56 <100 mg/dL    Non HDL Cholesterol 72 <130 mg/dL   CBC with platelets and differential    Collection Time: 08/12/20  9:14 AM   Result Value Ref Range    WBC 7.2 4.0 - 11.0 10e9/L    RBC Count 4.29 3.8 - 5.2 10e12/L    Hemoglobin 12.6 11.7 - 15.7 g/dL    Hematocrit 39.1 35.0 - 47.0 %    MCV 91 78 - 100 fl    MCH 29.4 26.5 - 33.0 pg    MCHC 32.2 31.5 - 36.5 g/dL    RDW 15.3 (H) 10.0 - 15.0 %    Platelet Count 269 150 - 450 10e9/L    % Neutrophils 70.6 %    % Lymphocytes 17.8 %    % Monocytes 10.1 %    % Eosinophils 1.2 %    % Basophils 0.3 %    Absolute Neutrophil 5.1 1.6 - 8.3 10e9/L    Absolute Lymphocytes 1.3 0.8 - 5.3 10e9/L    Absolute Monocytes 0.7 0.0 - 1.3 10e9/L    Absolute Eosinophils 0.1 0.0 - 0.7 10e9/L    Absolute Basophils 0.0 0.0 - 0.2 10e9/L    Diff Method Automated Method    TSH with free T4 reflex    Collection Time: 08/12/20  9:14 AM   Result Value Ref Range    TSH 4.05 (H) 0.40 - 4.00 mU/L   Hemoglobin A1c    Collection Time: 08/12/20  9:14 AM   Result Value Ref Range    Hemoglobin A1C 5.9 (H) 0 - 5.6 %   Comprehensive metabolic panel (BMP + Alb, Alk Phos, ALT, AST, Total. Bili, TP)    Collection Time: 08/12/20  9:14 AM   Result Value Ref Range    Sodium 138 133 - 144 mmol/L    Potassium 4.2 3.4 - 5.3 mmol/L    Chloride 108 94 - 109 mmol/L    Carbon Dioxide 24 20 - 32 mmol/L    Anion Gap  6 3 - 14 mmol/L    Glucose 113 (H) 70 - 99 mg/dL    Urea Nitrogen 23 7 - 30 mg/dL    Creatinine 0.57 0.52 - 1.04 mg/dL    GFR Estimate 84 >60 mL/min/[1.73_m2]    GFR Estimate If Black >90 >60 mL/min/[1.73_m2]    Calcium 9.6 8.5 - 10.1 mg/dL    Bilirubin Total 0.4 0.2 - 1.3 mg/dL    Albumin 3.8 3.4 - 5.0 g/dL    Protein Total 7.7 6.8 - 8.8 g/dL    Alkaline Phosphatase 53 40 - 150 U/L    ALT 22 0 - 50 U/L    AST 16 0 - 45 U/L   T4 free    Collection Time: 08/12/20  9:14 AM   Result Value Ref Range    T4 Free 1.11 0.76 - 1.46 ng/dL   UA reflex to Microscopic    Collection Time: 08/12/20  9:24 AM   Result Value Ref Range    Color Urine Yellow     Appearance Urine Slightly Cloudy     Glucose Urine Negative NEG^Negative mg/dL    Bilirubin Urine Negative NEG^Negative    Ketones Urine Negative NEG^Negative mg/dL    Specific Gravity Urine 1.025 1.003 - 1.035    Blood Urine Negative NEG^Negative    pH Urine 5.5 5.0 - 7.0 pH    Protein Albumin Urine Negative NEG^Negative mg/dL    Urobilinogen Urine 0.2 0.2 - 1.0 EU/dL    Nitrite Urine Positive (A) NEG^Negative    Leukocyte Esterase Urine Trace (A) NEG^Negative    Source Midstream Urine    Urine Microscopic    Collection Time: 08/12/20  9:24 AM   Result Value Ref Range    WBC Urine 0 - 5 OTO5^0 - 5 /HPF    RBC Urine O - 2 OTO2^O - 2 /HPF    Squamous Epithelial /LPF Urine Moderate (A) FEW^Few /LPF    Bacteria Urine Many (A) NEG^Negative /HPF     Letter sent.          At this time, the urine culture is pending.                     Your blood results are good.           The thyroid level is good. Keep taking the same dosage.     Your blood sugar control is good.       The A1C of 5.9 correlates to an average blood sugar of approximately 117.                   Your other lab results are normal ,including the liver,kidney, potassium, and cholesterol profile.

## 2020-08-11 ENCOUNTER — NURSE TRIAGE (OUTPATIENT)
Dept: FAMILY MEDICINE | Facility: CLINIC | Age: 85
End: 2020-08-11

## 2020-08-11 DIAGNOSIS — R30.0 DYSURIA: Primary | ICD-10-CM

## 2020-08-11 NOTE — TELEPHONE ENCOUNTER
"RN called back to pt.    Pt reports frequency and burning on urination.  Ongoing for 3-4 days.  Last time pt had this, she was dx with uti. This was 3-4 years ago.  No fevers.  No blood or discoloration.     Pt requesting that PCP places urine order for her to do as she would not have time for appt today, or appt tomorrow. PCP please advise.     If call back BEFORE noon: [phone]  AFTER noon, please leave voicemail: [phone]      Additional Information    Negative: Shock suspected (e.g., cold/pale/clammy skin, too weak to stand, low BP, rapid pulse)    Negative: Sounds like a life-threatening emergency to the triager    Negative: Unable to urinate (or only a few drops) and bladder feels very full    Negative: Vomiting    Negative: Patient sounds very sick or weak to the triager    Negative: SEVERE pain with urination    Negative: Fever > 100.5 F (38.1 C)    Negative: Side (flank) or lower back pain present    Age > 50 years    Negative: Taking antibiotic > 24 hours for UTI and fever persists    Negative: Taking antibiotic > 3 days for UTI and painful urination not improved    Negative: Unusual vaginal discharge    Negative: > 2 UTIs in last year    Negative: Patient is worried about sexually transmitted disease (STD)    Answer Assessment - Initial Assessment Questions  1. SEVERITY: \"How bad is the pain?\"  (e.g., Scale 1-10; mild, moderate, or severe)    - MILD (1-3): complains slightly about urination hurting    - MODERATE (4-7): interferes with normal activities      - SEVERE (8-10): excruciating, unwilling or unable to urinate because of the pain       Mild to moderate    2. FREQUENCY: \"How many times have you had painful urination today?\"       Got up 9 times last night    3. PATTERN: \"Is pain present every time you urinate or just sometimes?\"       Every time with urination    4. ONSET: \"When did the painful urination start?\"       3-4 days    5. FEVER: \"Do you have a fever?\" If so, ask: \"What is your " "temperature, how was it measured, and when did it start?\"      No    6. PAST UTI: \"Have you had a urine infection before?\" If so, ask: \"When was the last time?\" and \"What happened that time?\"       Yes, 3 years ago    7. CAUSE: \"What do you think is causing the painful urination?\"  (e.g., UTI, scratch, Herpes sore)      UTI    8. OTHER SYMPTOMS: \"Do you have any other symptoms?\" (e.g., flank pain, vaginal discharge, genital sores, urgency, blood in urine)      No    9. PREGNANCY: \"Is there any chance you are pregnant?\" \"When was your last menstrual period?\"      N/a    Protocols used: URINATION PAIN - FEMALE-A-OH      "

## 2020-08-11 NOTE — TELEPHONE ENCOUNTER
Patient has a lab appointment on 8/12/2020 she would like orders placed to get a urinalysis. She feels like she may have a uti.

## 2020-08-12 DIAGNOSIS — E03.9 HYPOTHYROIDISM, UNSPECIFIED TYPE: ICD-10-CM

## 2020-08-12 DIAGNOSIS — R30.0 DYSURIA: ICD-10-CM

## 2020-08-12 DIAGNOSIS — E11.9 TYPE 2 DIABETES MELLITUS WITHOUT COMPLICATION, WITHOUT LONG-TERM CURRENT USE OF INSULIN (H): ICD-10-CM

## 2020-08-12 DIAGNOSIS — Z79.01 LONG TERM CURRENT USE OF ANTICOAGULANT THERAPY: Chronic | ICD-10-CM

## 2020-08-12 DIAGNOSIS — E78.2 MIXED HYPERLIPIDEMIA: ICD-10-CM

## 2020-08-12 DIAGNOSIS — I48.0 PAROXYSMAL ATRIAL FIBRILLATION (H): ICD-10-CM

## 2020-08-12 LAB
ALBUMIN SERPL-MCNC: 3.8 G/DL (ref 3.4–5)
ALBUMIN UR-MCNC: NEGATIVE MG/DL
ALP SERPL-CCNC: 53 U/L (ref 40–150)
ALT SERPL W P-5'-P-CCNC: 22 U/L (ref 0–50)
ANION GAP SERPL CALCULATED.3IONS-SCNC: 6 MMOL/L (ref 3–14)
APPEARANCE UR: ABNORMAL
AST SERPL W P-5'-P-CCNC: 16 U/L (ref 0–45)
BACTERIA #/AREA URNS HPF: ABNORMAL /HPF
BASOPHILS # BLD AUTO: 0 10E9/L (ref 0–0.2)
BASOPHILS NFR BLD AUTO: 0.3 %
BILIRUB SERPL-MCNC: 0.4 MG/DL (ref 0.2–1.3)
BILIRUB UR QL STRIP: NEGATIVE
BUN SERPL-MCNC: 23 MG/DL (ref 7–30)
CALCIUM SERPL-MCNC: 9.6 MG/DL (ref 8.5–10.1)
CHLORIDE SERPL-SCNC: 108 MMOL/L (ref 94–109)
CHOLEST SERPL-MCNC: 168 MG/DL
CO2 SERPL-SCNC: 24 MMOL/L (ref 20–32)
COLOR UR AUTO: YELLOW
CREAT SERPL-MCNC: 0.57 MG/DL (ref 0.52–1.04)
DIFFERENTIAL METHOD BLD: ABNORMAL
EOSINOPHIL # BLD AUTO: 0.1 10E9/L (ref 0–0.7)
EOSINOPHIL NFR BLD AUTO: 1.2 %
ERYTHROCYTE [DISTWIDTH] IN BLOOD BY AUTOMATED COUNT: 15.3 % (ref 10–15)
GFR SERPL CREATININE-BSD FRML MDRD: 84 ML/MIN/{1.73_M2}
GLUCOSE SERPL-MCNC: 113 MG/DL (ref 70–99)
GLUCOSE UR STRIP-MCNC: NEGATIVE MG/DL
HBA1C MFR BLD: 5.9 % (ref 0–5.6)
HCT VFR BLD AUTO: 39.1 % (ref 35–47)
HDLC SERPL-MCNC: 96 MG/DL
HGB BLD-MCNC: 12.6 G/DL (ref 11.7–15.7)
HGB UR QL STRIP: NEGATIVE
KETONES UR STRIP-MCNC: NEGATIVE MG/DL
LDLC SERPL CALC-MCNC: 56 MG/DL
LEUKOCYTE ESTERASE UR QL STRIP: ABNORMAL
LYMPHOCYTES # BLD AUTO: 1.3 10E9/L (ref 0.8–5.3)
LYMPHOCYTES NFR BLD AUTO: 17.8 %
MCH RBC QN AUTO: 29.4 PG (ref 26.5–33)
MCHC RBC AUTO-ENTMCNC: 32.2 G/DL (ref 31.5–36.5)
MCV RBC AUTO: 91 FL (ref 78–100)
MONOCYTES # BLD AUTO: 0.7 10E9/L (ref 0–1.3)
MONOCYTES NFR BLD AUTO: 10.1 %
NEUTROPHILS # BLD AUTO: 5.1 10E9/L (ref 1.6–8.3)
NEUTROPHILS NFR BLD AUTO: 70.6 %
NITRATE UR QL: POSITIVE
NON-SQ EPI CELLS #/AREA URNS LPF: ABNORMAL /LPF
NONHDLC SERPL-MCNC: 72 MG/DL
PH UR STRIP: 5.5 PH (ref 5–7)
PLATELET # BLD AUTO: 269 10E9/L (ref 150–450)
POTASSIUM SERPL-SCNC: 4.2 MMOL/L (ref 3.4–5.3)
PROT SERPL-MCNC: 7.7 G/DL (ref 6.8–8.8)
RBC # BLD AUTO: 4.29 10E12/L (ref 3.8–5.2)
RBC #/AREA URNS AUTO: ABNORMAL /HPF
SODIUM SERPL-SCNC: 138 MMOL/L (ref 133–144)
SOURCE: ABNORMAL
SP GR UR STRIP: 1.02 (ref 1–1.03)
T4 FREE SERPL-MCNC: 1.11 NG/DL (ref 0.76–1.46)
TRIGL SERPL-MCNC: 82 MG/DL
TSH SERPL DL<=0.005 MIU/L-ACNC: 4.05 MU/L (ref 0.4–4)
UROBILINOGEN UR STRIP-ACNC: 0.2 EU/DL (ref 0.2–1)
WBC # BLD AUTO: 7.2 10E9/L (ref 4–11)
WBC #/AREA URNS AUTO: ABNORMAL /HPF

## 2020-08-12 PROCEDURE — 80061 LIPID PANEL: CPT | Performed by: INTERNAL MEDICINE

## 2020-08-12 PROCEDURE — 84443 ASSAY THYROID STIM HORMONE: CPT | Performed by: INTERNAL MEDICINE

## 2020-08-12 PROCEDURE — 80053 COMPREHEN METABOLIC PANEL: CPT | Performed by: INTERNAL MEDICINE

## 2020-08-12 PROCEDURE — 81001 URINALYSIS AUTO W/SCOPE: CPT | Performed by: INTERNAL MEDICINE

## 2020-08-12 PROCEDURE — 84439 ASSAY OF FREE THYROXINE: CPT | Performed by: INTERNAL MEDICINE

## 2020-08-12 PROCEDURE — 85025 COMPLETE CBC W/AUTO DIFF WBC: CPT | Performed by: INTERNAL MEDICINE

## 2020-08-12 PROCEDURE — 36415 COLL VENOUS BLD VENIPUNCTURE: CPT | Performed by: INTERNAL MEDICINE

## 2020-08-12 PROCEDURE — 83036 HEMOGLOBIN GLYCOSYLATED A1C: CPT | Performed by: INTERNAL MEDICINE

## 2020-08-12 PROCEDURE — 87086 URINE CULTURE/COLONY COUNT: CPT | Performed by: INTERNAL MEDICINE

## 2020-08-13 ENCOUNTER — TELEPHONE (OUTPATIENT)
Dept: FAMILY MEDICINE | Facility: CLINIC | Age: 85
End: 2020-08-13

## 2020-08-13 LAB
BACTERIA SPEC CULT: NORMAL
SPECIMEN SOURCE: NORMAL

## 2020-08-13 NOTE — TELEPHONE ENCOUNTER
Dr. Razo,     Pt calling, wondering about her UA/UC results.   Symptoms mostly at night. Slight burning.   Taking daily probiotic.    She will be up in [place] for the next few weeks.  Please call her at this # [phone], if you want to prescribe any meds?

## 2020-08-14 NOTE — TELEPHONE ENCOUNTER
See my message attached to the lab result.              No UTI.     Try cutting back on caffeine, spices, etc.                    Please let her know.

## 2020-10-14 DIAGNOSIS — I48.0 PAROXYSMAL ATRIAL FIBRILLATION (H): ICD-10-CM

## 2020-10-15 RX ORDER — WARFARIN SODIUM 2 MG/1
TABLET ORAL
Qty: 180 TABLET | Refills: 3 | Status: SHIPPED | OUTPATIENT
Start: 2020-10-15 | End: 2021-07-05

## 2021-05-14 ENCOUNTER — OFFICE VISIT (OUTPATIENT)
Dept: FAMILY MEDICINE | Facility: CLINIC | Age: 86
End: 2021-05-14
Payer: MEDICARE

## 2021-05-14 VITALS
DIASTOLIC BLOOD PRESSURE: 65 MMHG | TEMPERATURE: 96.4 F | HEIGHT: 64 IN | BODY MASS INDEX: 18.44 KG/M2 | OXYGEN SATURATION: 99 % | HEART RATE: 69 BPM | WEIGHT: 108 LBS | SYSTOLIC BLOOD PRESSURE: 140 MMHG

## 2021-05-14 DIAGNOSIS — S81.801A LEG WOUND, RIGHT, INITIAL ENCOUNTER: Primary | ICD-10-CM

## 2021-05-14 DIAGNOSIS — L03.115 CELLULITIS OF RIGHT LOWER EXTREMITY: ICD-10-CM

## 2021-05-14 PROCEDURE — 99214 OFFICE O/P EST MOD 30 MIN: CPT | Performed by: INTERNAL MEDICINE

## 2021-05-14 RX ORDER — MUPIROCIN 20 MG/G
OINTMENT TOPICAL 3 TIMES DAILY
Qty: 30 G | Refills: 0 | Status: SHIPPED | OUTPATIENT
Start: 2021-05-14

## 2021-05-14 RX ORDER — APIXABAN 2.5 MG/1
2.5 TABLET, FILM COATED ORAL 2 TIMES DAILY
COMMUNITY
Start: 2021-04-08

## 2021-05-14 ASSESSMENT — MIFFLIN-ST. JEOR: SCORE: 914.88

## 2021-05-14 NOTE — PROGRESS NOTES
Assessment & Plan   Problem List Items Addressed This Visit     None      Visit Diagnoses     Leg wound, right, initial encounter    -  Primary    Cellulitis of right lower extremity        Possible early cellulitis    Relevant Medications    cephALEXin (KEFLEX) 250 MG capsule    mupirocin (BACTROBAN) 2 % external ointment         Possible early cellulitis versus inflammatory changes around wound, and wound is healing appropriately with scab formation.  I doubt there is significant infection at this time, we did give her Bactroban ointment to apply and also gave her prescription for Keflex; she is going out of town for couple weeks and she would like to be covered with antibiotics; she had intestinal surgery in the past and she does have diarrhea on a chronic basis, advised her that antibiotics may cause worsening of the diarrhea, she does take probiotics daily.  She is willing to hold on starting oral antibiotics and just apply topical Bactroban, if redness swelling persists or worsen she will start immediately oral Keflex for 5 days.  Both prescriptions sent to pharmacy on record. TD imm UTD 2013. patient on chronic anticoagulation, no hematoma or active bleed.         Work on weight loss  Regular exercise  See Patient Instructions    Return in about 4 weeks (around 6/11/2021), or if symptoms worsen or fail to improve, for As needed and if symptoms worsen.    Karthik Chowdary MD  [facility] DANNY Davila is a 86 year old who presents for the following health issues   HPI      Patient presented for evaluation of wound on the right lower anterior distal shin area ,she hit the area on a metal object and sustained wound 2-1/2 weeks ago.  The wound has formed a scab, she still complaining of pain especially when standing on her leg, she is concerned with infection.    Review of Systems   Constitutional, HEENT, cardiovascular, pulmonary, gi and gu systems are negative, except as  "otherwise noted.      Objective    BP (!) 140/65 (BP Location: Left arm, Patient Position: Chair, Cuff Size: Adult Regular)   Pulse 69   Temp 96.4  F (35.8  C) (Temporal)   Ht 1.626 m (5' 4\")   Wt 49 kg (108 lb)   LMP  (LMP Unknown)   SpO2 99%   BMI 18.54 kg/m    Body mass index is 18.54 kg/m .  Physical Exam   General appearance not in acute distress, right lower extremity ; the right anterior mid shin area there is a 2 x 1 cm oval-shaped scab and another lesion 0.5 cm scab inferior to that on the anterior chin, minimal erythrema noticeable surrounding the scab Lesion, area slightly tender to palpation surrounding the scab.  No red streaks.  No lymphedema.    Orders Only on 08/12/2020   Component Date Value Ref Range Status     Specimen Description 08/12/2020 Midstream Urine   Final     Culture Micro 08/12/2020    Final                    Value:10,000 to 50,000 colonies/mL  mixed urogenital dae  Susceptibility testing not routinely done       Color Urine 08/12/2020 Yellow   Final     Appearance Urine 08/12/2020 Slightly Cloudy   Final     Glucose Urine 08/12/2020 Negative  NEG^Negative mg/dL Final     Bilirubin Urine 08/12/2020 Negative  NEG^Negative Final     Ketones Urine 08/12/2020 Negative  NEG^Negative mg/dL Final     Specific Gravity Urine 08/12/2020 1.025  1.003 - 1.035 Final     Blood Urine 08/12/2020 Negative  NEG^Negative Final     pH Urine 08/12/2020 5.5  5.0 - 7.0 pH Final     Protein Albumin Urine 08/12/2020 Negative  NEG^Negative mg/dL Final     Urobilinogen Urine 08/12/2020 0.2  0.2 - 1.0 EU/dL Final     Nitrite Urine 08/12/2020 Positive* NEG^Negative Final     Leukocyte Esterase Urine 08/12/2020 Trace* NEG^Negative Final     Source 08/12/2020 Midstream Urine   Final     Cholesterol 08/12/2020 168  <200 mg/dL Final     Triglycerides 08/12/2020 82  <150 mg/dL Final    Fasting specimen     HDL Cholesterol 08/12/2020 96  >49 mg/dL Final     LDL Cholesterol Calculated 08/12/2020 56  <100 mg/dL " Final    Desirable:       <100 mg/dl     Non HDL Cholesterol 08/12/2020 72  <130 mg/dL Final     WBC 08/12/2020 7.2  4.0 - 11.0 10e9/L Final     RBC Count 08/12/2020 4.29  3.8 - 5.2 10e12/L Final     Hemoglobin 08/12/2020 12.6  11.7 - 15.7 g/dL Final     Hematocrit 08/12/2020 39.1  35.0 - 47.0 % Final     MCV 08/12/2020 91  78 - 100 fl Final     MCH 08/12/2020 29.4  26.5 - 33.0 pg Final     MCHC 08/12/2020 32.2  31.5 - 36.5 g/dL Final     RDW 08/12/2020 15.3* 10.0 - 15.0 % Final     Platelet Count 08/12/2020 269  150 - 450 10e9/L Final     % Neutrophils 08/12/2020 70.6  % Final     % Lymphocytes 08/12/2020 17.8  % Final     % Monocytes 08/12/2020 10.1  % Final     % Eosinophils 08/12/2020 1.2  % Final     % Basophils 08/12/2020 0.3  % Final     Absolute Neutrophil 08/12/2020 5.1  1.6 - 8.3 10e9/L Final     Absolute Lymphocytes 08/12/2020 1.3  0.8 - 5.3 10e9/L Final     Absolute Monocytes 08/12/2020 0.7  0.0 - 1.3 10e9/L Final     Absolute Eosinophils 08/12/2020 0.1  0.0 - 0.7 10e9/L Final     Absolute Basophils 08/12/2020 0.0  0.0 - 0.2 10e9/L Final     Diff Method 08/12/2020 Automated Method   Final     TSH 08/12/2020 4.05* 0.40 - 4.00 mU/L Final     Hemoglobin A1C 08/12/2020 5.9* 0 - 5.6 % Final    Comment: Normal <5.7% Prediabetes 5.7-6.4%  Diabetes 6.5% or higher - adopted from ADA   consensus guidelines.       Sodium 08/12/2020 138  133 - 144 mmol/L Final     Potassium 08/12/2020 4.2  3.4 - 5.3 mmol/L Final     Chloride 08/12/2020 108  94 - 109 mmol/L Final     Carbon Dioxide 08/12/2020 24  20 - 32 mmol/L Final     Anion Gap 08/12/2020 6  3 - 14 mmol/L Final     Glucose 08/12/2020 113* 70 - 99 mg/dL Final    Fasting specimen     Urea Nitrogen 08/12/2020 23  7 - 30 mg/dL Final     Creatinine 08/12/2020 0.57  0.52 - 1.04 mg/dL Final     GFR Estimate 08/12/2020 84  >60 mL/min/[1.73_m2] Final    Comment: Non  GFR Calc  Starting 12/18/2018, serum creatinine based estimated GFR (eGFR) will be    calculated using the Chronic Kidney Disease Epidemiology Collaboration   (CKD-EPI) equation.       GFR Estimate If Black 08/12/2020 >90  >60 mL/min/[1.73_m2] Final    Comment:  GFR Calc  Starting 12/18/2018, serum creatinine based estimated GFR (eGFR) will be   calculated using the Chronic Kidney Disease Epidemiology Collaboration   (CKD-EPI) equation.       Calcium 08/12/2020 9.6  8.5 - 10.1 mg/dL Final     Bilirubin Total 08/12/2020 0.4  0.2 - 1.3 mg/dL Final     Albumin 08/12/2020 3.8  3.4 - 5.0 g/dL Final     Protein Total 08/12/2020 7.7  6.8 - 8.8 g/dL Final     Alkaline Phosphatase 08/12/2020 53  40 - 150 U/L Final     ALT 08/12/2020 22  0 - 50 U/L Final     AST 08/12/2020 16  0 - 45 U/L Final     WBC Urine 08/12/2020 0 - 5  OTO5^0 - 5 /HPF Final     RBC Urine 08/12/2020 O - 2  OTO2^O - 2 /HPF Final     Squamous Epithelial /LPF Urine 08/12/2020 Moderate* FEW^Few /LPF Final     Bacteria Urine 08/12/2020 Many* NEG^Negative /HPF Final     T4 Free 08/12/2020 1.11  0.76 - 1.46 ng/dL Final

## 2021-06-24 DIAGNOSIS — E78.2 MIXED HYPERLIPIDEMIA: ICD-10-CM

## 2021-06-24 DIAGNOSIS — I10 BENIGN ESSENTIAL HYPERTENSION: ICD-10-CM

## 2021-06-24 DIAGNOSIS — E03.9 HYPOTHYROIDISM, UNSPECIFIED TYPE: ICD-10-CM

## 2021-06-24 RX ORDER — LEVOTHYROXINE SODIUM 50 UG/1
TABLET ORAL
Qty: 90 TABLET | Refills: 3 | OUTPATIENT
Start: 2021-06-24

## 2021-06-24 RX ORDER — BENAZEPRIL HYDROCHLORIDE 40 MG/1
TABLET ORAL
Qty: 90 TABLET | Refills: 3 | OUTPATIENT
Start: 2021-06-24

## 2021-06-24 RX ORDER — ATORVASTATIN CALCIUM 10 MG/1
TABLET, FILM COATED ORAL
Qty: 90 TABLET | Refills: 0 | Status: SHIPPED | OUTPATIENT
Start: 2021-06-24 | End: 2021-09-30

## 2021-06-24 RX ORDER — AMLODIPINE BESYLATE 10 MG/1
TABLET ORAL
Qty: 90 TABLET | Refills: 3 | OUTPATIENT
Start: 2021-06-24

## 2021-06-24 NOTE — TELEPHONE ENCOUNTER
benazepril (LOTENSIN)  Routing refill request to provider for review/approval because:  BP not at goal       Amlodipine  Routing refill request to provider for review/approval because:  BP not at goal       Levothyroxine  Routing refill request to provider for review/approval because:  Labs out of range:  TSH    BP Readings from Last 3 Encounters:   05/14/21 (!) 140/65   07/10/19 130/70   05/15/18 110/70     TSH   Date Value Ref Range Status   08/12/2020 4.05 (H) 0.40 - 4.00 mU/L Final

## 2021-06-30 RX ORDER — AMLODIPINE BESYLATE 10 MG/1
10 TABLET ORAL DAILY
Qty: 90 TABLET | Refills: 0 | Status: SHIPPED | OUTPATIENT
Start: 2021-06-30 | End: 2021-09-30

## 2021-06-30 RX ORDER — LEVOTHYROXINE SODIUM 50 UG/1
TABLET ORAL
Qty: 90 TABLET | Refills: 0 | Status: SHIPPED | OUTPATIENT
Start: 2021-06-30 | End: 2021-09-30

## 2021-06-30 RX ORDER — BENAZEPRIL HYDROCHLORIDE 40 MG/1
40 TABLET ORAL DAILY
Qty: 90 TABLET | Refills: 0 | Status: SHIPPED | OUTPATIENT
Start: 2021-06-30 | End: 2021-09-30

## 2021-07-04 NOTE — PATIENT INSTRUCTIONS
"I have generated a lab order.              Please call ahead for a \"lab only\" appointment.                            Please note that I am planning to retire on December 31, 2021.              You are welcome to continue your care through this clinic.              For a variety of reasons I will not refer you to a specific provider.               "

## 2021-07-05 ENCOUNTER — VIRTUAL VISIT (OUTPATIENT)
Dept: INTERNAL MEDICINE | Facility: CLINIC | Age: 86
End: 2021-07-05
Payer: MEDICARE

## 2021-07-05 DIAGNOSIS — E78.2 MIXED HYPERLIPIDEMIA: ICD-10-CM

## 2021-07-05 DIAGNOSIS — Z79.01 LONG TERM CURRENT USE OF ANTICOAGULANT THERAPY: ICD-10-CM

## 2021-07-05 DIAGNOSIS — E11.9 TYPE 2 DIABETES MELLITUS WITHOUT COMPLICATION, WITHOUT LONG-TERM CURRENT USE OF INSULIN (H): ICD-10-CM

## 2021-07-05 DIAGNOSIS — E03.9 HYPOTHYROIDISM, UNSPECIFIED TYPE: ICD-10-CM

## 2021-07-05 DIAGNOSIS — I48.0 PAROXYSMAL ATRIAL FIBRILLATION (H): Primary | Chronic | ICD-10-CM

## 2021-07-05 PROCEDURE — 99442 PR PHYSICIAN TELEPHONE EVALUATION 11-20 MIN: CPT | Mod: 95 | Performed by: INTERNAL MEDICINE

## 2021-07-05 NOTE — PROGRESS NOTES
"Lola is a 86 year old who is being evaluated via a billable telephone visit.      What phone number would you like to be contacted at? [phone]  How would you like to obtain your AVS? Mail a copy    Assessment & Plan     Paroxysmal atrial fibrillation (H)  Continue flecainide and Eliquis    Type 2 diabetes mellitus without complication, without long-term current use of insulin (H)  She will return for lab work  - Comprehensive metabolic panel (BMP + Alb, Alk Phos, ALT, AST, Total. Bili, TP)  - Hemoglobin A1c    Hypothyroidism, unspecified type  Check labs and adjust medications as indicated.  Future lab ordered  - TSH with free T4 reflex  - CBC with platelets and differential    Mixed hyperlipidemia  Okay to stop taking fenofibrate.  Your triglycerides may not be much of an issue any longer as she has lost significant weight.  - Comprehensive metabolic panel (BMP + Alb, Alk Phos, ALT, AST, Total. Bili, TP)  - Lipid Profile (Chol, Trig, HDL, LDL calc)    Long-term (current) use of anticoagulants [Z79.01]  Continue Eliquis.  Check hemoglobin.  - CBC with platelets and differential               Patient Instructions   I have generated a lab order.              Please call ahead for a \"lab only\" appointment.                            Please note that I am planning to retire on December 31, 2021.              You are welcome to continue your care through this clinic.              For a variety of reasons I will not refer you to a specific provider.                   Return in about 3 weeks (around 7/26/2021) for lab only visit; non-fasting.    Rodger Razo MD  [facility]  ADDENDUM:labs on 9/22:      Recent Results (from the past 200 hour(s))   Comprehensive metabolic panel (BMP + Alb, Alk Phos, ALT, AST, Total. Bili, TP)    Collection Time: 09/22/21  1:39 PM   Result Value Ref Range    Sodium 138 133 - 144 mmol/L    Potassium 4.3 3.4 - 5.3 mmol/L    Chloride 106 94 - 109 " mmol/L    Carbon Dioxide (CO2) 25 20 - 32 mmol/L    Anion Gap 7 3 - 14 mmol/L    Urea Nitrogen 26 7 - 30 mg/dL    Creatinine 0.58 0.52 - 1.04 mg/dL    Calcium 8.8 8.5 - 10.1 mg/dL    Glucose 112 (H) 70 - 99 mg/dL    Alkaline Phosphatase 95 40 - 150 U/L    AST 21 0 - 45 U/L    ALT 33 0 - 50 U/L    Protein Total 7.5 6.8 - 8.8 g/dL    Albumin 3.7 3.4 - 5.0 g/dL    Bilirubin Total 0.3 0.2 - 1.3 mg/dL    GFR Estimate 84 >60 mL/min/1.73m2   Lipid Profile (Chol, Trig, HDL, LDL calc)    Collection Time: 09/22/21  1:39 PM   Result Value Ref Range    Cholesterol 180 <200 mg/dL    Triglycerides 201 (H) <150 mg/dL    Direct Measure HDL 94 >=50 mg/dL    LDL Cholesterol Calculated 46 <=100 mg/dL    Non HDL Cholesterol 86 <130 mg/dL    Patient Fasting > 8hrs? No    TSH with free T4 reflex    Collection Time: 09/22/21  1:39 PM   Result Value Ref Range    TSH 2.84 0.40 - 4.00 mU/L   Hemoglobin A1c    Collection Time: 09/22/21  1:39 PM   Result Value Ref Range    Hemoglobin A1C 5.7 (H) 0.0 - 5.6 %   CBC with platelets and differential    Collection Time: 09/22/21  1:39 PM   Result Value Ref Range    WBC Count 8.3 4.0 - 11.0 10e3/uL    RBC Count 4.15 3.80 - 5.20 10e6/uL    Hemoglobin 12.7 11.7 - 15.7 g/dL    Hematocrit 37.5 35.0 - 47.0 %    MCV 90 78 - 100 fL    MCH 30.6 26.5 - 33.0 pg    MCHC 33.9 31.5 - 36.5 g/dL    RDW 15.0 10.0 - 15.0 %    Platelet Count 237 150 - 450 10e3/uL    % Neutrophils 69 %    % Lymphocytes 20 %    % Monocytes 10 %    % Eosinophils 1 %    % Basophils 0 %    Absolute Neutrophils 5.8 1.6 - 8.3 10e3/uL    Absolute Lymphocytes 1.6 0.8 - 5.3 10e3/uL    Absolute Monocytes 0.8 0.0 - 1.3 10e3/uL    Absolute Eosinophils 0.1 0.0 - 0.7 10e3/uL    Absolute Basophils 0.0 0.0 - 0.2 10e3/uL     Letter sent.                 The thyroid level is good. Keep taking the same dosage.     Your blood sugar control is good.       The A1C of 5.7 correlates to an average blood sugar of approximately 111.     Your other lab results  are normal or stable,including the liver,kidney, cholesterol, and the bone marrow function.          Best wishes and stay healthy!              Malina Davila is a 86 year old who presents for the following health issues     HPI     Hyperlipidemia Follow-Up      Are you regularly taking any medication or supplement to lower your cholesterol?   Yes- atorvastatin    Are you having muscle aches or other side effects that you think could be caused by your cholesterol lowering medication?  No    Hypertension Follow-up      Do you check your blood pressure regularly outside of the clinic? No     Are you following a low salt diet? Yes    Are your blood pressures ever more than 140 on the top number (systolic) OR more   than 90 on the bottom number (diastolic), for example 140/90? No    Hypothyroidism Follow-up      Since last visit, patient describes the following symptoms: Weight stable, no hair loss, no skin changes, no constipation, no loose stools      How many servings of fruits and vegetables do you eat daily?  2-3    On average, how many sweetened beverages do you drink each day (Examples: soda, juice, sweet tea, etc.  Do NOT count diet or artificially sweetened beverages)?   0    How many days per week do you exercise enough to make your heart beat faster? 3 or less    How many minutes a day do you exercise enough to make your heart beat faster? 9 or less    How many days per week do you miss taking your medication? 0         [facility]         She and her  are celebrating their 65th  anniversary tomorrow.          They spend the murray in [place].           S/p TKA in  in [place].                                                        S/p COVID vaccine, obtained in [place].             2 of their elderly friends  of Covid illness.                                       She currently has a slowly improving leg wound.   She  scraped her leg on a piece of metal.            She required antibiotics.      She had some GI distress.                 She has lost weight; currently  back up to 106 lbs.               she had labs last fall in [place].  She was told she did not need to keep taking fenofibrate, but she has kept taking it, taking her last tablet today.     Cardiology switched her anticoagulant to Eliquis.  No bleeding issues.             She continues with flecainide.  No symptoms of recurrent arrhythmias.               We discussed my upcoming shelter.            Review of Systems         Current Outpatient Medications   Medication Sig Dispense Refill     amLODIPine (NORVASC) 10 MG tablet Take 1 tablet (10 mg) by mouth daily 90 tablet 0     atorvastatin (LIPITOR) 10 MG tablet TAKE 1 TABLET(10 MG) BY MOUTH DAILY 90 tablet 0     benazepril (LOTENSIN) 40 MG tablet Take 1 tablet (40 mg) by mouth daily 90 tablet 0     Calcium-Vitamin D 600-200 MG-UNIT TABS Take 1 tablet by mouth daily        colesevelam (WELCHOL) 625 MG tablet Patient taking 3 tabs in morning, and 3 tabs at night       ELIQUIS ANTICOAGULANT 2.5 MG tablet Take 2.5 mg by mouth 2 times daily       fenofibrate (TRICOR) 145 MG tablet Take 1 tablet (145 mg) by mouth daily 90 tablet 3     flecainide (TAMBOCOR) 100 MG tablet Take 1 tablet (100 mg) by mouth 2 times daily 180 tablet 3     levothyroxine (SYNTHROID/LEVOTHROID) 50 MCG tablet TAKE 1 TABLET(50 MCG) BY MOUTH DAILY 90 tablet 0     Multiple Vitamins-Minerals (CENTRUM SILVER PO) Take 1 tablet by mouth daily        mupirocin (BACTROBAN) 2 % external ointment Apply topically 3 times daily 30 g 0     Immunization History   Administered Date(s) Administered     COVID-19,PF,Pfizer 03/10/2021, 03/31/2021     Influenza (High Dose) 3 valent vaccine 10/12/2012, 09/18/2013, 09/15/2014, 09/30/2015, 09/28/2016, 10/10/2017, 10/11/2018, 10/04/2019, 09/21/2020     Influenza (IIV3) PF 10/09/2004, 10/24/2007, 09/14/2009, 09/09/2010,  09/29/2011     Pneumo Conj 13-V (2010&after) 09/30/2015     Pneumococcal 23 valent 10/29/1997, 10/05/2009     TD (ADULT, 7+) 11/07/2004, 09/28/2013     Typhoid IM 09/27/2001     Yellow Fever 09/27/2001     Zoster vaccine, live 10/07/2009       Objective           Vitals:  No vitals were obtained today due to virtual visit.    Physical Exam     PSYCH: Alert and oriented times 3; coherent speech, normal   rate and volume, able to articulate logical thoughts, able   to abstract reason, no tangential thoughts, no hallucinations   or delusions  Her affect is normal and pleasant  RESP: No cough, no audible wheezing, able to talk in full sentences  Remainder of exam unable to be completed due to telephone visits                Phone call duration: 15 minutes

## 2021-07-12 DIAGNOSIS — I48.0 PAROXYSMAL ATRIAL FIBRILLATION (H): ICD-10-CM

## 2021-07-12 RX ORDER — FLECAINIDE ACETATE 100 MG/1
TABLET ORAL
Qty: 180 TABLET | Refills: 3 | Status: SHIPPED | OUTPATIENT
Start: 2021-07-12 | End: 2022-07-11

## 2021-09-22 ENCOUNTER — LAB (OUTPATIENT)
Dept: LAB | Facility: CLINIC | Age: 86
End: 2021-09-22
Payer: MEDICARE

## 2021-09-22 DIAGNOSIS — Z79.01 LONG TERM CURRENT USE OF ANTICOAGULANT THERAPY: ICD-10-CM

## 2021-09-22 DIAGNOSIS — E78.2 MIXED HYPERLIPIDEMIA: ICD-10-CM

## 2021-09-22 DIAGNOSIS — E03.9 HYPOTHYROIDISM, UNSPECIFIED TYPE: ICD-10-CM

## 2021-09-22 DIAGNOSIS — E11.9 TYPE 2 DIABETES MELLITUS WITHOUT COMPLICATION, WITHOUT LONG-TERM CURRENT USE OF INSULIN (H): ICD-10-CM

## 2021-09-22 LAB
BASOPHILS # BLD AUTO: 0 10E3/UL (ref 0–0.2)
BASOPHILS NFR BLD AUTO: 0 %
EOSINOPHIL # BLD AUTO: 0.1 10E3/UL (ref 0–0.7)
EOSINOPHIL NFR BLD AUTO: 1 %
ERYTHROCYTE [DISTWIDTH] IN BLOOD BY AUTOMATED COUNT: 15 % (ref 10–15)
HBA1C MFR BLD: 5.7 % (ref 0–5.6)
HCT VFR BLD AUTO: 37.5 % (ref 35–47)
HGB BLD-MCNC: 12.7 G/DL (ref 11.7–15.7)
LYMPHOCYTES # BLD AUTO: 1.6 10E3/UL (ref 0.8–5.3)
LYMPHOCYTES NFR BLD AUTO: 20 %
MCH RBC QN AUTO: 30.6 PG (ref 26.5–33)
MCHC RBC AUTO-ENTMCNC: 33.9 G/DL (ref 31.5–36.5)
MCV RBC AUTO: 90 FL (ref 78–100)
MONOCYTES # BLD AUTO: 0.8 10E3/UL (ref 0–1.3)
MONOCYTES NFR BLD AUTO: 10 %
NEUTROPHILS # BLD AUTO: 5.8 10E3/UL (ref 1.6–8.3)
NEUTROPHILS NFR BLD AUTO: 69 %
PLATELET # BLD AUTO: 237 10E3/UL (ref 150–450)
RBC # BLD AUTO: 4.15 10E6/UL (ref 3.8–5.2)
WBC # BLD AUTO: 8.3 10E3/UL (ref 4–11)

## 2021-09-22 PROCEDURE — 84443 ASSAY THYROID STIM HORMONE: CPT

## 2021-09-22 PROCEDURE — 83036 HEMOGLOBIN GLYCOSYLATED A1C: CPT

## 2021-09-22 PROCEDURE — 36415 COLL VENOUS BLD VENIPUNCTURE: CPT

## 2021-09-22 PROCEDURE — 80061 LIPID PANEL: CPT

## 2021-09-22 PROCEDURE — 85025 COMPLETE CBC W/AUTO DIFF WBC: CPT

## 2021-09-22 PROCEDURE — 80053 COMPREHEN METABOLIC PANEL: CPT

## 2021-09-23 LAB
ALBUMIN SERPL-MCNC: 3.7 G/DL (ref 3.4–5)
ALP SERPL-CCNC: 95 U/L (ref 40–150)
ALT SERPL W P-5'-P-CCNC: 33 U/L (ref 0–50)
ANION GAP SERPL CALCULATED.3IONS-SCNC: 7 MMOL/L (ref 3–14)
AST SERPL W P-5'-P-CCNC: 21 U/L (ref 0–45)
BILIRUB SERPL-MCNC: 0.3 MG/DL (ref 0.2–1.3)
BUN SERPL-MCNC: 26 MG/DL (ref 7–30)
CALCIUM SERPL-MCNC: 8.8 MG/DL (ref 8.5–10.1)
CHLORIDE BLD-SCNC: 106 MMOL/L (ref 94–109)
CHOLEST SERPL-MCNC: 180 MG/DL
CO2 SERPL-SCNC: 25 MMOL/L (ref 20–32)
CREAT SERPL-MCNC: 0.58 MG/DL (ref 0.52–1.04)
FASTING STATUS PATIENT QL REPORTED: NO
GFR SERPL CREATININE-BSD FRML MDRD: 84 ML/MIN/1.73M2
GLUCOSE BLD-MCNC: 112 MG/DL (ref 70–99)
HDLC SERPL-MCNC: 94 MG/DL
LDLC SERPL CALC-MCNC: 46 MG/DL
NONHDLC SERPL-MCNC: 86 MG/DL
POTASSIUM BLD-SCNC: 4.3 MMOL/L (ref 3.4–5.3)
PROT SERPL-MCNC: 7.5 G/DL (ref 6.8–8.8)
SODIUM SERPL-SCNC: 138 MMOL/L (ref 133–144)
TRIGL SERPL-MCNC: 201 MG/DL
TSH SERPL DL<=0.005 MIU/L-ACNC: 2.84 MU/L (ref 0.4–4)

## 2022-04-01 ENCOUNTER — TRANSFERRED RECORDS (OUTPATIENT)
Dept: HEALTH INFORMATION MANAGEMENT | Facility: CLINIC | Age: 87
End: 2022-04-01

## 2022-04-01 LAB — RETINOPATHY: NORMAL

## 2022-07-08 RX ORDER — FENOFIBRATE 145 MG/1
145 TABLET, COATED ORAL
COMMUNITY

## 2022-07-08 NOTE — PROGRESS NOTES
Lola is a 87 year old who is being evaluated via a billable telephone visit.      What phone number would you like to be contacted at? call [phone]  How would you like to obtain your AVS? Mail a copy    Assessment & Plan     Paroxysmal atrial fibrillation (H)  - Follows with Cardiology in [place] as her primary cardiology team.  Needs flecainide refill. Discussed with patient that she should obtain future refills from Cardiology.  Will refill today to get her enough to see Cardiology  - Labs ordered  - EKG ordered to check QTc- patient advised to schedule RN visit at the time she gets labs.  Pt verbalizes an understanding.  - flecainide (TAMBOCOR) 100 MG tablet  Dispense: 180 tablet; Refill: 0  - CBC with platelets  - Comprehensive metabolic panel (BMP + Alb, Alk Phos, ALT, AST, Total. Bili, TP)  - EKG 12-lead complete w/read - Clinics    Benign essential hypertension  - Check labs  - Will refill meds when labs available.    Type 2 diabetes mellitus without complication, without long-term current use of insulin (H)  - A1c ordered  - REVIEW OF HEALTH MAINTENANCE PROTOCOL ORDERS  - Hemoglobin A1c    Mixed hyperlipidemia  - Check lipids  - Lipid Profile (Chol, Trig, HDL, LDL calc)    Hypothyroidism, unspecified type  - Check TSH  - TSH with free T4 reflex    -Going forward, advised flecainide refills per Cardiology  -Will check labs, and refill other meds when labs completed.  Follow up for in person visit     See Patient Instructions    Return for with any available provider.    BERYL Manning [facility]   Lola is a 87 year old  presenting for the following health issues:  RECHECK (Follow up for DM, HTN, Lipids and Thyroid)    Follow up:   Diabetes, BP, lipids, thyroid    Used to follow with Dr. Razo who has since retired.  Currently at [facility].  Resides in [facility] from [facility].    HPI     Diabetes Follow-up      How often are you  "checking your blood sugar? Not at all    What concerns do you have today about your diabetes? None     Do you have any of these symptoms? (Select all that apply)  No numbness or tingling in feet.  No redness, sores or blisters on feet.  No complaints of excessive thirst.  No reports of blurry vision.  No significant changes to weight.    Have you had a diabetic eye exam in the last 12 months? Yes- Date of last eye exam: 4-2022,  Location: [place], [place]    Due for labs- last A1c 5.7%; currently diet controlled.    Hyperlipidemia Follow-Up      Are you regularly taking any medication or supplement to lower your cholesterol?   Yes- Atorvastatin    Are you having muscle aches or other side effects that you think could be caused by your cholesterol lowering medication?  No    Due for labs; currently maintained on atorvastatin and welchol.     Hypertension Follow-up      Do you check your blood pressure regularly outside of the clinic? No     Are you following a low salt diet? Yes    Are your blood pressures ever more than 140 on the top number (systolic) OR more   than 90 on the bottom number (diastolic), for example 140/90? Unknown-not checking    Currently maintained on amlodipine 10 mg/day, benazepril 40 mg/day, flecainide 100 mg BID.      Atrial Fibrillation:  Maintained on flecainide 100 mg BID, eliquis 2.5 mg BID.  Patient follows with Cardiology in [place]- has f/u scheduled in October.  Historically previous PCP would fill flecainide.  Patient denies dizziness, CP, SOB.  She reports that her primary Cardiologist in [place] checks an EKG yearly and was told it was \"ok\".  Needs refill of flecainide- states her Cardiologist in [place] has offered to refill, but she has always been prescribed by PCP.    BP Readings from Last 2 Encounters:   05/14/21 (!) 140/65   07/10/19 130/70     Hemoglobin A1C POCT (%)   Date Value   08/12/2020 5.9 (H)   07/10/2019 5.9 (H)     Hemoglobin A1C (%)   Date Value "   09/22/2021 5.7 (H)     LDL Cholesterol Calculated (mg/dL)   Date Value   09/22/2021 46   08/12/2020 56   05/12/2017 58       Hypothyroidism Follow-up      Since last visit, patient describes the following symptoms: Weight stable, no hair loss, no skin changes, no constipation, no loose stools    How many days per week do you miss taking your medication? 0    Needs refills of flecainide and soon needs all meds.      Review of Systems   CONSTITUTIONAL:NEGATIVE for fever, chills, change in weight  RESP:NEGATIVE for significant cough or SOB  CV: NEGATIVE for chest pain, palpitations or peripheral edema  MUSCULOSKELETAL: NEGATIVE for significant arthralgias or myalgia  NEURO: NEGATIVE for weakness, dizziness or paresthesias      Objective           Vitals:  No vitals were obtained today due to virtual visit.    Physical Exam   healthy, alert and no distress  PSYCH: Alert and oriented times 3; coherent speech, normal   rate and volume, able to articulate logical thoughts, able   to abstract reason, no tangential thoughts, no hallucinations   or delusions  Her affect is normal  RESP: No cough, no audible wheezing, able to talk in full sentences  Remainder of exam unable to be completed due to telephone visits    -Chart/labs reviewed        Phone call duration: 9 minutes    .  ..

## 2022-07-11 ENCOUNTER — VIRTUAL VISIT (OUTPATIENT)
Dept: INTERNAL MEDICINE | Facility: CLINIC | Age: 87
End: 2022-07-11
Payer: MEDICARE

## 2022-07-11 DIAGNOSIS — I48.0 PAROXYSMAL ATRIAL FIBRILLATION (H): Primary | ICD-10-CM

## 2022-07-11 DIAGNOSIS — E11.9 TYPE 2 DIABETES MELLITUS WITHOUT COMPLICATION, WITHOUT LONG-TERM CURRENT USE OF INSULIN (H): ICD-10-CM

## 2022-07-11 DIAGNOSIS — I10 BENIGN ESSENTIAL HYPERTENSION: ICD-10-CM

## 2022-07-11 DIAGNOSIS — E03.9 HYPOTHYROIDISM, UNSPECIFIED TYPE: ICD-10-CM

## 2022-07-11 DIAGNOSIS — E78.2 MIXED HYPERLIPIDEMIA: ICD-10-CM

## 2022-07-11 PROCEDURE — 99441 PR PHYSICIAN TELEPHONE EVALUATION 5-10 MIN: CPT | Mod: 95 | Performed by: NURSE PRACTITIONER

## 2022-07-11 RX ORDER — TRIAMCINOLONE ACETONIDE 1 MG/G
CREAM TOPICAL
COMMUNITY
Start: 2022-02-28

## 2022-07-11 RX ORDER — FLECAINIDE ACETATE 100 MG/1
TABLET ORAL
Qty: 180 TABLET | Refills: 0 | Status: SHIPPED | OUTPATIENT
Start: 2022-07-11

## 2022-07-14 ENCOUNTER — TELEPHONE (OUTPATIENT)
Dept: INTERNAL MEDICINE | Facility: CLINIC | Age: 87
End: 2022-07-14

## 2022-07-14 NOTE — TELEPHONE ENCOUNTER
I have reviewed the history, physical exam, assessment and management plans.  I concur with or have edited all elements of her note. Patient Contact    Attempt # 1    Was call answered?  No.  Left message on voicemail with information to call me back.    Upon call back, patient needs to be scheduled for an EKG when she has lab work completed. Currently, patient is scheduled for lab work at [facility]. Per RN at [facility], MAs are not able to do EKGs unless the PCP is available to read the EKG right away. Patient can reschedule her lab appointment for [facility] and then can placed on the MA schedule here for her EKG or she can keep her lab appt at [facility] and then come here for her EKG.    Annel Valente RN

## 2022-07-14 NOTE — TELEPHONE ENCOUNTER
Patient called, stated provider that she saw was going to put in an order for echocardiogram. Patient stated no order was put in and requests one please.

## 2022-07-14 NOTE — TELEPHONE ENCOUNTER
Rosy Alvarado,    Did you want patient to complete an echocardiogram? If so, need order placed.     Annel Valente RN

## 2022-07-15 NOTE — TELEPHONE ENCOUNTER
Can either reschedule her lab appointment for [facility] and then can placed on the MA schedule at  for EKG or she can keep her lab appt at [facility] and then come to  for her EKG.      Patient Contact    Attempt # 2    Was call answered?  No.  Left message on voicemail with information to call me back.

## 2022-07-27 ENCOUNTER — LAB (OUTPATIENT)
Dept: LAB | Facility: CLINIC | Age: 87
End: 2022-07-27
Payer: MEDICARE

## 2022-07-27 DIAGNOSIS — E11.9 TYPE 2 DIABETES MELLITUS WITHOUT COMPLICATION, WITHOUT LONG-TERM CURRENT USE OF INSULIN (H): ICD-10-CM

## 2022-07-27 DIAGNOSIS — E03.9 HYPOTHYROIDISM, UNSPECIFIED TYPE: ICD-10-CM

## 2022-07-27 DIAGNOSIS — I48.0 PAROXYSMAL ATRIAL FIBRILLATION (H): ICD-10-CM

## 2022-07-27 DIAGNOSIS — E78.2 MIXED HYPERLIPIDEMIA: ICD-10-CM

## 2022-07-27 LAB
ERYTHROCYTE [DISTWIDTH] IN BLOOD BY AUTOMATED COUNT: 14.9 % (ref 10–15)
HBA1C MFR BLD: 5.9 % (ref 0–5.6)
HCT VFR BLD AUTO: 39.1 % (ref 35–47)
HGB BLD-MCNC: 12.7 G/DL (ref 11.7–15.7)
MCH RBC QN AUTO: 29.6 PG (ref 26.5–33)
MCHC RBC AUTO-ENTMCNC: 32.5 G/DL (ref 31.5–36.5)
MCV RBC AUTO: 91 FL (ref 78–100)
PLATELET # BLD AUTO: 233 10E3/UL (ref 150–450)
RBC # BLD AUTO: 4.29 10E6/UL (ref 3.8–5.2)
WBC # BLD AUTO: 7.1 10E3/UL (ref 4–11)

## 2022-07-27 PROCEDURE — 83036 HEMOGLOBIN GLYCOSYLATED A1C: CPT

## 2022-07-27 PROCEDURE — 84443 ASSAY THYROID STIM HORMONE: CPT

## 2022-07-27 PROCEDURE — 80053 COMPREHEN METABOLIC PANEL: CPT

## 2022-07-27 PROCEDURE — 36415 COLL VENOUS BLD VENIPUNCTURE: CPT

## 2022-07-27 PROCEDURE — 85027 COMPLETE CBC AUTOMATED: CPT

## 2022-07-27 PROCEDURE — 80061 LIPID PANEL: CPT

## 2022-07-28 LAB
ALBUMIN SERPL-MCNC: 3.8 G/DL (ref 3.4–5)
ALP SERPL-CCNC: 92 U/L (ref 40–150)
ALT SERPL W P-5'-P-CCNC: 24 U/L (ref 0–50)
ANION GAP SERPL CALCULATED.3IONS-SCNC: 7 MMOL/L (ref 3–14)
AST SERPL W P-5'-P-CCNC: 14 U/L (ref 0–45)
BILIRUB SERPL-MCNC: 0.6 MG/DL (ref 0.2–1.3)
BUN SERPL-MCNC: 26 MG/DL (ref 7–30)
CALCIUM SERPL-MCNC: 9.7 MG/DL (ref 8.5–10.1)
CHLORIDE BLD-SCNC: 104 MMOL/L (ref 94–109)
CHOLEST SERPL-MCNC: 174 MG/DL
CO2 SERPL-SCNC: 24 MMOL/L (ref 20–32)
CREAT SERPL-MCNC: 0.54 MG/DL (ref 0.52–1.04)
FASTING STATUS PATIENT QL REPORTED: YES
GFR SERPL CREATININE-BSD FRML MDRD: 89 ML/MIN/1.73M2
GLUCOSE BLD-MCNC: 132 MG/DL (ref 70–99)
HDLC SERPL-MCNC: 106 MG/DL
LDLC SERPL CALC-MCNC: 49 MG/DL
NONHDLC SERPL-MCNC: 68 MG/DL
POTASSIUM BLD-SCNC: 4.4 MMOL/L (ref 3.4–5.3)
PROT SERPL-MCNC: 7.5 G/DL (ref 6.8–8.8)
SODIUM SERPL-SCNC: 135 MMOL/L (ref 133–144)
TRIGL SERPL-MCNC: 94 MG/DL
TSH SERPL DL<=0.005 MIU/L-ACNC: 2.61 MU/L (ref 0.4–4)

## 2022-10-20 DIAGNOSIS — I48.0 PAROXYSMAL ATRIAL FIBRILLATION (H): ICD-10-CM

## 2022-10-20 DIAGNOSIS — I10 BENIGN ESSENTIAL HYPERTENSION: ICD-10-CM

## 2022-10-20 DIAGNOSIS — E78.2 MIXED HYPERLIPIDEMIA: ICD-10-CM

## 2022-10-20 DIAGNOSIS — E03.9 HYPOTHYROIDISM, UNSPECIFIED TYPE: ICD-10-CM

## 2022-10-20 NOTE — TELEPHONE ENCOUNTER
Reason for Call:  Other call back    Detailed comments: Pt is wanting a refill on Flecainide, pt has ekg scheduled in [place] in 2 weeks, Amlodipine, Lipitor, Benazepril, Levothyroxine. Please call pt back to discuss. Pt is now in [place] for the winter.    Phone Number Patient can be reached at: [phone]    Best Time: na    Can we leave a detailed message on this number? Not Applicable    Call taken on 10/20/2022 at 8:17 AM by Landy Hines

## 2022-10-21 RX ORDER — FLECAINIDE ACETATE 100 MG/1
TABLET ORAL
Qty: 180 TABLET | Refills: 0 | Status: CANCELLED | OUTPATIENT
Start: 2022-10-21

## 2022-10-21 NOTE — TELEPHONE ENCOUNTER
Called patient she is in [facility] now and has set up an appointment to establish care with Dr. Peralta in may when she returns she is hoping a 6 month supply of medications can be sent to the pharmacy in [facility] until her visit. She did not know rahul dutton had left [place] prior to going to [place]     Please see note from rahul  Going forward, advised flecainide refills per Cardiology    Patient will call cardiologist for the refill of Flecanide     Patient is out of all pended medications    Raphael Martinez RN

## 2022-10-22 RX ORDER — AMLODIPINE BESYLATE 10 MG/1
10 TABLET ORAL DAILY
Qty: 90 TABLET | Refills: 1 | Status: SHIPPED | OUTPATIENT
Start: 2022-10-22

## 2022-10-22 RX ORDER — ATORVASTATIN CALCIUM 10 MG/1
10 TABLET, FILM COATED ORAL DAILY
Qty: 90 TABLET | Refills: 1 | Status: SHIPPED | OUTPATIENT
Start: 2022-10-22

## 2022-10-22 RX ORDER — BENAZEPRIL HYDROCHLORIDE 40 MG/1
40 TABLET ORAL DAILY
Qty: 90 TABLET | Refills: 1 | Status: SHIPPED | OUTPATIENT
Start: 2022-10-22

## 2022-10-22 RX ORDER — LEVOTHYROXINE SODIUM 50 UG/1
TABLET ORAL
Qty: 90 TABLET | Refills: 1 | Status: SHIPPED | OUTPATIENT
Start: 2022-10-22

## 2023-10-09 NOTE — TELEPHONE ENCOUNTER
Called pt and scheduled her for appt tomorrow with Dr. Razo  Please remove the requested med, as this will be addressed tomorrow. Thanks!   Quality 130: Documentation Of Current Medications In The Medical Record: Current Medications Documented Detail Level: Detailed Quality 431: Preventive Care And Screening: Unhealthy Alcohol Use - Screening: Patient not identified as an unhealthy alcohol user when screened for unhealthy alcohol use using a systematic screening method Quality 226: Preventive Care And Screening: Tobacco Use: Screening And Cessation Intervention: Patient screened for tobacco use and is an ex/non-smoker

## 2024-04-05 ENCOUNTER — APPOINTMENT (RX ONLY)
Dept: URBAN - METROPOLITAN AREA CLINIC 126 | Facility: CLINIC | Age: 89
Setting detail: DERMATOLOGY
End: 2024-04-05

## 2024-04-05 DIAGNOSIS — L81.7 PIGMENTED PURPURIC DERMATOSIS: ICD-10-CM

## 2024-04-05 DIAGNOSIS — L81.4 OTHER MELANIN HYPERPIGMENTATION: ICD-10-CM

## 2024-04-05 DIAGNOSIS — Z85.828 PERSONAL HISTORY OF OTHER MALIGNANT NEOPLASM OF SKIN: ICD-10-CM

## 2024-04-05 DIAGNOSIS — Z71.89 OTHER SPECIFIED COUNSELING: ICD-10-CM

## 2024-04-05 DIAGNOSIS — D49.2 NEOPLASM OF UNSPECIFIED BEHAVIOR OF BONE, SOFT TISSUE, AND SKIN: ICD-10-CM

## 2024-04-05 DIAGNOSIS — L82.1 OTHER SEBORRHEIC KERATOSIS: ICD-10-CM

## 2024-04-05 PROCEDURE — ? SUNSCREEN RECOMMENDATIONS

## 2024-04-05 PROCEDURE — 11103 TANGNTL BX SKIN EA SEP/ADDL: CPT

## 2024-04-05 PROCEDURE — ? COUNSELING

## 2024-04-05 PROCEDURE — ? BIOPSY BY SHAVE METHOD

## 2024-04-05 PROCEDURE — 99203 OFFICE O/P NEW LOW 30 MIN: CPT | Mod: 25

## 2024-04-05 PROCEDURE — 11102 TANGNTL BX SKIN SINGLE LES: CPT

## 2024-04-05 ASSESSMENT — LOCATION DETAILED DESCRIPTION DERM
LOCATION DETAILED: RIGHT DISTAL PRETIBIAL REGION
LOCATION DETAILED: RIGHT UPPER CUTANEOUS LIP
LOCATION DETAILED: LEFT DISTAL PRETIBIAL REGION
LOCATION DETAILED: LEFT INFERIOR CENTRAL MALAR CHEEK
LOCATION DETAILED: RIGHT INFERIOR CENTRAL MALAR CHEEK
LOCATION DETAILED: LEFT SUPERIOR ANTERIOR NECK
LOCATION DETAILED: LEFT PROXIMAL PRETIBIAL REGION

## 2024-04-05 ASSESSMENT — LOCATION ZONE DERM
LOCATION ZONE: NECK
LOCATION ZONE: FACE
LOCATION ZONE: LEG
LOCATION ZONE: LIP

## 2024-04-05 ASSESSMENT — LOCATION SIMPLE DESCRIPTION DERM
LOCATION SIMPLE: RIGHT PRETIBIAL REGION
LOCATION SIMPLE: LEFT ANTERIOR NECK
LOCATION SIMPLE: LEFT CHEEK
LOCATION SIMPLE: RIGHT CHEEK
LOCATION SIMPLE: LEFT PRETIBIAL REGION
LOCATION SIMPLE: RIGHT LIP

## 2024-04-05 NOTE — PROCEDURE: BIOPSY BY SHAVE METHOD
Body Location Override (Optional - Billing Will Still Be Based On Selected Body Map Location If Applicable): left anterior neck
Detail Level: Detailed
Depth Of Biopsy: dermis
Was A Bandage Applied: Yes
Size Of Lesion In Cm: 1.5
X Size Of Lesion In Cm: 0
Biopsy Type: H and E
Biopsy Method: Personna blade
Anesthesia Type: 1% lidocaine without epinephrine and a 1:3 solution of 8.4% sodium bicarbonate
Anesthesia Volume In Cc: 0.5
Hemostasis: Electrocautery and Aluminum Chloride
Wound Care: Petrolatum
Dressing: bandage
Destruction After The Procedure: No
Type Of Destruction Used: Curettage
Curettage Text: The wound bed was treated with curettage after the biopsy was performed.
Cryotherapy Text: The wound bed was treated with cryotherapy after the biopsy was performed.
Electrodesiccation Text: The wound bed was treated with electrodesiccation after the biopsy was performed.
Electrodesiccation And Curettage Text: The wound bed was treated with electrodesiccation and curettage after the biopsy was performed.
Silver Nitrate Text: The wound bed was treated with silver nitrate after the biopsy was performed.
Lab: -[id number]
Lab Facility: [id number]
Consent: Written consent was obtained and risks were reviewed including but not limited to scarring, infection, bleeding, scabbing, incomplete removal, nerve damage and allergy to anesthesia.
Post-Care Instructions: I reviewed with the patient in detail post-care instructions. Patient is to keep the biopsy site dry overnight, and then apply bacitracin twice daily until healed. Patient may apply hydrogen peroxide soaks to remove any crusting.
Notification Instructions: Patient will be notified of biopsy results. However, patient instructed to call the office if not contacted within 2 weeks.
Billing Type: Third-Party Bill
Information: Selecting Yes will display possible errors in your note based on the variables you have selected. This validation is only offered as a suggestion for you. PLEASE NOTE THAT THE VALIDATION TEXT WILL BE REMOVED WHEN YOU FINALIZE YOUR NOTE. IF YOU WANT TO FAX A PRELIMINARY NOTE YOU WILL NEED TO TOGGLE THIS TO 'NO' IF YOU DO NOT WANT IT IN YOUR FAXED NOTE.
Body Location Override (Optional - Billing Will Still Be Based On Selected Body Map Location If Applicable): left lateral calf
Body Location Override (Optional - Billing Will Still Be Based On Selected Body Map Location If Applicable): left lateral cheek
Size Of Lesion In Cm: 1
Body Location Override (Optional - Billing Will Still Be Based On Selected Body Map Location If Applicable): right cheek
Size Of Lesion In Cm: 1.8

## 2024-06-20 ENCOUNTER — LAB REQUISITION (OUTPATIENT)
Dept: LAB | Facility: CLINIC | Age: 89
End: 2024-06-20
Payer: MEDICARE

## 2024-06-20 DIAGNOSIS — L08.9 LOCAL INFECTION OF THE SKIN AND SUBCUTANEOUS TISSUE, UNSPECIFIED: ICD-10-CM

## 2024-06-20 PROCEDURE — 87186 SC STD MICRODIL/AGAR DIL: CPT | Mod: ORL | Performed by: DERMATOLOGY

## 2024-06-24 LAB
BACTERIA WND CULT: ABNORMAL
BACTERIA WND CULT: ABNORMAL

## 2024-07-02 ENCOUNTER — MEDICAL CORRESPONDENCE (OUTPATIENT)
Dept: HEALTH INFORMATION MANAGEMENT | Facility: CLINIC | Age: 89
End: 2024-07-02
Payer: MEDICARE

## 2024-07-02 DIAGNOSIS — Z79.899 ENCOUNTER FOR LONG-TERM (CURRENT) USE OF HIGH-RISK MEDICATION: Primary | ICD-10-CM

## 2024-07-03 ENCOUNTER — HOSPITAL ENCOUNTER (OUTPATIENT)
Dept: CARDIOLOGY | Facility: CLINIC | Age: 89
Discharge: HOME | End: 2024-07-03
Attending: DERMATOLOGY | Admitting: DERMATOLOGY
Payer: MEDICARE

## 2024-07-03 DIAGNOSIS — Z79.899 ENCOUNTER FOR LONG-TERM (CURRENT) USE OF HIGH-RISK MEDICATION: ICD-10-CM

## 2024-07-03 LAB
ATRIAL RATE - MUSE: 48 BPM
DIASTOLIC BLOOD PRESSURE - MUSE: NORMAL MMHG
INTERPRETATION ECG - MUSE: NORMAL
P AXIS - MUSE: 61 DEGREES
PR INTERVAL - MUSE: 272 MS
QRS DURATION - MUSE: 146 MS
QT - MUSE: 520 MS
QTC - MUSE: 464 MS
R AXIS - MUSE: -53 DEGREES
SYSTOLIC BLOOD PRESSURE - MUSE: NORMAL MMHG
T AXIS - MUSE: 60 DEGREES
VENTRICULAR RATE- MUSE: 48 BPM

## 2024-07-03 PROCEDURE — 93010 ELECTROCARDIOGRAM REPORT: CPT | Performed by: INTERNAL MEDICINE

## 2024-07-03 PROCEDURE — 93005 ELECTROCARDIOGRAM TRACING: CPT

## 2024-11-08 ENCOUNTER — APPOINTMENT (RX ONLY)
Dept: URBAN - METROPOLITAN AREA CLINIC 126 | Facility: CLINIC | Age: 89
Setting detail: DERMATOLOGY
End: 2024-11-08

## 2024-11-08 DIAGNOSIS — D49.2 NEOPLASM OF UNSPECIFIED BEHAVIOR OF BONE, SOFT TISSUE, AND SKIN: ICD-10-CM

## 2024-11-08 DIAGNOSIS — L81.7 PIGMENTED PURPURIC DERMATOSIS: ICD-10-CM

## 2024-11-08 DIAGNOSIS — L82.1 OTHER SEBORRHEIC KERATOSIS: ICD-10-CM

## 2024-11-08 DIAGNOSIS — Z85.828 PERSONAL HISTORY OF OTHER MALIGNANT NEOPLASM OF SKIN: ICD-10-CM

## 2024-11-08 DIAGNOSIS — Z71.89 OTHER SPECIFIED COUNSELING: ICD-10-CM

## 2024-11-08 DIAGNOSIS — D69.2 OTHER NONTHROMBOCYTOPENIC PURPURA: ICD-10-CM

## 2024-11-08 DIAGNOSIS — L81.4 OTHER MELANIN HYPERPIGMENTATION: ICD-10-CM

## 2024-11-08 DIAGNOSIS — L57.0 ACTINIC KERATOSIS: ICD-10-CM

## 2024-11-08 PROBLEM — C44.319 BASAL CELL CARCINOMA OF SKIN OF OTHER PARTS OF FACE: Status: ACTIVE | Noted: 2024-11-08

## 2024-11-08 PROCEDURE — ? BIOPSY BY SHAVE METHOD

## 2024-11-08 PROCEDURE — 17000 DESTRUCT PREMALG LESION: CPT | Mod: 59

## 2024-11-08 PROCEDURE — ? DIAGNOSIS COMMENT

## 2024-11-08 PROCEDURE — 17003 DESTRUCT PREMALG LES 2-14: CPT

## 2024-11-08 PROCEDURE — ? LIQUID NITROGEN

## 2024-11-08 PROCEDURE — 99213 OFFICE O/P EST LOW 20 MIN: CPT | Mod: 25

## 2024-11-08 PROCEDURE — 11103 TANGNTL BX SKIN EA SEP/ADDL: CPT

## 2024-11-08 PROCEDURE — ? SUNSCREEN RECOMMENDATIONS

## 2024-11-08 PROCEDURE — ? COUNSELING

## 2024-11-08 PROCEDURE — 11102 TANGNTL BX SKIN SINGLE LES: CPT

## 2024-11-08 ASSESSMENT — LOCATION SIMPLE DESCRIPTION DERM
LOCATION SIMPLE: RIGHT PRETIBIAL REGION
LOCATION SIMPLE: LEFT PRETIBIAL REGION
LOCATION SIMPLE: CHEST
LOCATION SIMPLE: RIGHT LIP
LOCATION SIMPLE: LEFT UPPER BACK
LOCATION SIMPLE: LEFT HAND
LOCATION SIMPLE: LEFT ANTERIOR NECK
LOCATION SIMPLE: LEFT FOREARM

## 2024-11-08 ASSESSMENT — LOCATION ZONE DERM
LOCATION ZONE: HAND
LOCATION ZONE: NECK
LOCATION ZONE: LIP
LOCATION ZONE: LEG
LOCATION ZONE: TRUNK
LOCATION ZONE: ARM

## 2024-11-08 ASSESSMENT — LOCATION DETAILED DESCRIPTION DERM
LOCATION DETAILED: RIGHT PROXIMAL PRETIBIAL REGION
LOCATION DETAILED: MIDDLE STERNUM
LOCATION DETAILED: LEFT SUPERIOR ANTERIOR NECK
LOCATION DETAILED: LEFT SUPERIOR UPPER BACK
LOCATION DETAILED: LEFT INFERIOR UPPER BACK
LOCATION DETAILED: LEFT RADIAL DORSAL HAND
LOCATION DETAILED: LEFT DISTAL DORSAL FOREARM
LOCATION DETAILED: LEFT MID-UPPER BACK
LOCATION DETAILED: LEFT PROXIMAL PRETIBIAL REGION
LOCATION DETAILED: RIGHT UPPER CUTANEOUS LIP
LOCATION DETAILED: RIGHT DISTAL PRETIBIAL REGION
LOCATION DETAILED: LEFT PROXIMAL DORSAL FOREARM

## 2024-11-08 NOTE — PROCEDURE: BIOPSY BY SHAVE METHOD
Body Location Override (Optional - Billing Will Still Be Based On Selected Body Map Location If Applicable): left radial wrist
Detail Level: Detailed
Depth Of Biopsy: dermis
Was A Bandage Applied: Yes
Size Of Lesion In Cm: 0
Biopsy Type: H and E
Biopsy Method: Personna blade
Anesthesia Type: 1% lidocaine with 1:200,000 epinephrine and a 1:10 solution of 8.4% sodium bicarbonate
Anesthesia Volume In Cc: 3
Hemostasis: Aluminum Chloride and Thermocautery
Wound Care: Petrolatum
Dressing: bandage
Destruction After The Procedure: No
Type Of Destruction Used: Curettage
Curettage Text: The wound bed was treated with curettage after the biopsy was performed.
Cryotherapy Text: The wound bed was treated with cryotherapy after the biopsy was performed.
Electrodesiccation Text: The wound bed was treated with electrodesiccation after the biopsy was performed.
Electrodesiccation And Curettage Text: The wound bed was treated with electrodesiccation and curettage after the biopsy was performed.
Silver Nitrate Text: The wound bed was treated with silver nitrate after the biopsy was performed.
Lab: -[id number]
Lab Facility: [id number]
Consent: Written consent was obtained and risks were reviewed including but not limited to scarring, infection, bleeding, scabbing, incomplete removal, nerve damage and allergy to anesthesia.
Post-Care Instructions: I reviewed with the patient in detail post-care instructions. Patient is to keep the biopsy site dry overnight, and then apply bacitracin twice daily until healed. Patient may apply hydrogen peroxide soaks to remove any crusting.
Notification Instructions: Patient will be notified of biopsy results. However, patient instructed to call the office if not contacted within 2 weeks.
Billing Type: Third-Party Bill
Information: Selecting Yes will display possible errors in your note based on the variables you have selected. This validation is only offered as a suggestion for you. PLEASE NOTE THAT THE VALIDATION TEXT WILL BE REMOVED WHEN YOU FINALIZE YOUR NOTE. IF YOU WANT TO FAX A PRELIMINARY NOTE YOU WILL NEED TO TOGGLE THIS TO 'NO' IF YOU DO NOT WANT IT IN YOUR FAXED NOTE.
Body Location Override (Optional - Billing Will Still Be Based On Selected Body Map Location If Applicable): left mid back

## 2024-11-08 NOTE — PROCEDURE: DIAGNOSIS COMMENT
Render Risk Assessment In Note?: no
Detail Level: Simple
Comment: Pathology proven up north, located at the left inner canthus, but as of the current examination, there is no clinical evidence of active concern (e.g., growth, ulceration, etc.). The plan is to closely monitor over time to assess for any changes that may warrant intervention. Patient advised of risks versus benefits

## 2024-11-08 NOTE — PROCEDURE: LIQUID NITROGEN
Detail Level: Detailed
Post-Care Instructions: I reviewed with the patient in detail post-care instructions. Patient is to wear sunprotection, and avoid picking at any of the treated lesions. Pt may apply Vaseline to crusted or scabbing areas.
Duration Of Freeze Thaw-Cycle (Seconds): 0
Render Note In Bullet Format When Appropriate: No
Application Tool (Optional): Liquid Nitrogen Sprayer
Show Aperture Variable?: Yes
Consent: The patient's consent was obtained including but not limited to risks of crusting, scabbing, blistering, scarring, darker or lighter pigmentary change, recurrence, incomplete removal and infection.
Number Of Freeze-Thaw Cycles: 3 freeze-thaw cycles

## 2024-11-25 ENCOUNTER — APPOINTMENT (RX ONLY)
Dept: URBAN - METROPOLITAN AREA CLINIC 126 | Facility: CLINIC | Age: 89
Setting detail: DERMATOLOGY
End: 2024-11-25

## 2024-11-25 DIAGNOSIS — Z01.818 ENCOUNTER FOR OTHER PREPROCEDURAL EXAMINATION: ICD-10-CM

## 2024-11-25 PROCEDURE — ? COUNSELING

## 2025-01-03 ENCOUNTER — APPOINTMENT (OUTPATIENT)
Dept: URBAN - METROPOLITAN AREA CLINIC 127 | Facility: CLINIC | Age: OVER 89
Setting detail: DERMATOLOGY
End: 2025-01-03

## 2025-01-03 VITALS
DIASTOLIC BLOOD PRESSURE: 77 MMHG | HEART RATE: 76 BPM | WEIGHT: 104 LBS | SYSTOLIC BLOOD PRESSURE: 162 MMHG | HEIGHT: 64 IN

## 2025-01-03 VITALS — WEIGHT: 104 LBS | HEIGHT: 64 IN

## 2025-01-03 DIAGNOSIS — Z01.818 ENCOUNTER FOR OTHER PREPROCEDURAL EXAMINATION: ICD-10-CM

## 2025-01-03 PROBLEM — D04.62 CARCINOMA IN SITU OF SKIN OF LEFT UPPER LIMB, INCLUDING SHOULDER: Status: ACTIVE | Noted: 2025-01-03

## 2025-01-03 PROCEDURE — 99214 OFFICE O/P EST MOD 30 MIN: CPT

## 2025-01-03 PROCEDURE — ? COUNSELING

## 2025-01-03 PROCEDURE — ? MEDICATION COUNSELING

## 2025-01-03 PROCEDURE — ? PRESCRIPTION

## 2025-01-03 PROCEDURE — ? CONSULTATION FOR MOHS SURGERY

## 2025-01-03 RX ORDER — PHARMACY COMPOUNDING ACCESSORY
1 EACH MISCELLANEOUS BID
Qty: 30 | Refills: 0 | Status: ERX | COMMUNITY
Start: 2025-01-03

## 2025-01-03 RX ADMIN — Medication 1: at 00:00

## 2025-01-03 NOTE — PROCEDURE: MEDICATION COUNSELING
Olanzapine Counseling- I discussed with the patient the common side effects of olanzapine including but are not limited to: lack of energy, dry mouth, increased appetite, sleepiness, tremor, constipation, dizziness, changes in behavior, or restlessness.  Explained that teenagers are more likely to experience headaches, abdominal pain, pain in the arms or legs, tiredness, and sleepiness.  Serious side effects include but are not limited: increased risk of death in elderly patients who are confused, have memory loss, or dementia-related psychosis; hyperglycemia; increased cholesterol and triglycerides; and weight gain.
Zepbound Counseling: I reviewed the possible side effects including: thyroid tumors, kidney disease, gallbladder disease, abdominal pain, constipation, diarrhea, nausea, vomiting and pancreatitis. Do not take this medication if you have a history or family history of multiple endocrine neoplasia syndrome type 2. Side effects reviewed, pt to contact office should one occur.
Hydroxychloroquine Counseling:  I discussed with the patient that a baseline ophthalmologic exam is needed at the start of therapy and every year thereafter while on therapy. A CBC may also be warranted for monitoring.  The side effects of this medication were discussed with the patient, including but not limited to agranulocytosis, aplastic anemia, seizures, rashes, retinopathy, and liver toxicity. Patient instructed to call the office should any adverse effect occur.  The patient verbalized understanding of the proper use and possible adverse effects of Plaquenil.  All the patient's questions and concerns were addressed.
Birth Control Pills Pregnancy And Lactation Text: This medication should be avoided if pregnant and for the first 30 days post-partum.
Cyclophosphamide Pregnancy And Lactation Text: This medication is Pregnancy Category D and it isn't considered safe during pregnancy. This medication is excreted in breast milk.
Spevigo Pregnancy And Lactation Text: The risk during pregnancy and breastfeeding is uncertain with this medication. This medication does cross the placenta. It is unknown if this medication is found in breast milk.
Topical Ketoconazole Pregnancy And Lactation Text: This medication is Pregnancy Category B and is considered safe during pregnancy. It is unknown if it is excreted in breast milk.
Valtrex Pregnancy And Lactation Text: this medication is Pregnancy Category B and is considered safe during pregnancy. This medication is not directly found in breast milk but it's metabolite acyclovir is present.
Azithromycin Pregnancy And Lactation Text: This medication is considered safe during pregnancy and is also secreted in breast milk.
Rifampin Pregnancy And Lactation Text: This medication is Pregnancy Category C and it isn't know if it is safe during pregnancy. It is also excreted in breast milk and should not be used if you are breast feeding.
Ilumya Counseling: I discussed with the patient the risks of tildrakizumab including but not limited to immunosuppression, malignancy, posterior leukoencephalopathy syndrome, and serious infections.  The patient understands that monitoring is required including a PPD at baseline and must alert us or the primary physician if symptoms of infection or other concerning signs are noted.
Adbry Pregnancy And Lactation Text: It is unknown if this medication will adversely affect pregnancy or breast feeding.
High Dose Vitamin A Counseling: Side effects reviewed, pt to contact office should one occur.
Protopic Counseling: Patient may experience a mild burning sensation during topical application. Protopic is not approved in children less than 2 years of age. There have been case reports of hematologic and skin malignancies in patients using topical calcineurin inhibitors although causality is questionable.
Cyclosporine Counseling:  I discussed with the patient the risks of cyclosporine including but not limited to hypertension, gingival hyperplasia,myelosuppression, immunosuppression, liver damage, kidney damage, neurotoxicity, lymphoma, and serious infections. The patient understands that monitoring is required including baseline blood pressure, CBC, CMP, lipid panel and uric acid, and then 1-2 times monthly CMP and blood pressure.
Stelara Counseling:  I discussed with the patient the risks of ustekinumab including but not limited to immunosuppression, malignancy, posterior leukoencephalopathy syndrome, and serious infections.  The patient understands that monitoring is required including a PPD at baseline and must alert us or the primary physician if symptoms of infection or other concerning signs are noted.
Use Enhanced Medication Counseling?: No
Bactrim Counseling:  I discussed with the patient the risks of sulfa antibiotics including but not limited to GI upset, allergic reaction, drug rash, diarrhea, dizziness, photosensitivity, and yeast infections.  Rarely, more serious reactions can occur including but not limited to aplastic anemia, agranulocytosis, methemoglobinemia, blood dyscrasias, liver or kidney failure, lung infiltrates or desquamative/blistering drug rashes.
Elidel Pregnancy And Lactation Text: This medication is Pregnancy Category C. It is unknown if this medication is excreted in breast milk.
Litfulo Counseling: I discussed with the patient the risks of Litfulo therapy including but not limited to upper respiratory tract infections, shingles, cold sores, and nausea. Live vaccines should be avoided.  This medication has been linked to serious infections; higher rate of mortality; malignancy and lymphoproliferative disorders; major adverse cardiovascular events; thrombosis; gastrointestinal perforations; neutropenia; lymphopenia; anemia; liver enzyme elevations; and lipid elevations.
Hydroxychloroquine Pregnancy And Lactation Text: This medication has been shown to cause fetal harm but it isn't assigned a Pregnancy Risk Category. There are small amounts excreted in breast milk.
Bimzelx Counseling:  I discussed with the patient the risks of Bimzelx including but not limited to depression, immunosuppression, allergic reactions and infections.  The patient understands that monitoring is required including a PPD at baseline and must alert us or the primary physician if symptoms of infection or other concerning signs are noted.
Spironolactone Counseling: Patient advised regarding risks of diarrhea, abdominal pain, hyperkalemia, birth defects (for female patients), liver toxicity and renal toxicity. The patient may need blood work to monitor liver and kidney function and potassium levels while on therapy. The patient verbalized understanding of the proper use and possible adverse effects of spironolactone.  All of the patient's questions and concerns were addressed.
Ilumya Pregnancy And Lactation Text: The risk during pregnancy and breastfeeding is uncertain with this medication.
Erivedge Counseling- I discussed with the patient the risks of Erivedge including but not limited to nausea, vomiting, diarrhea, constipation, weight loss, changes in the sense of taste, decreased appetite, muscle spasms, and hair loss.  The patient verbalized understanding of the proper use and possible adverse effects of Erivedge.  All of the patient's questions and concerns were addressed.
Topical Metronidazole Counseling: Metronidazole is a topical antibiotic medication. You may experience burning, stinging, redness, or allergic reactions.  Please call our office if you develop any problems from using this medication.
Olanzapine Pregnancy And Lactation Text: This medication is pregnancy category C.   There are no adequate and well controlled trials with olanzapine in pregnant females.  Olanzapine should be used during pregnancy only if the potential benefit justifies the potential risk to the fetus.   In a study in lactating healthy women, olanzapine was excreted in breast milk.  It is recommended that women taking olanzapine should not breast feed.
Protopic Pregnancy And Lactation Text: This medication is Pregnancy Category C. It is unknown if this medication is excreted in breast milk when applied topically.
Zepbound Pregnancy And Lactation Text: The fetal risk of this medication is unknown and taking while pregnant is not recommended. It is unknown if this medication is present in breast milk.
Xelzayz Pregnancy And Lactation Text: This medication is Pregnancy Category D and is not considered safe during pregnancy.  The risk during breast feeding is also uncertain.
Sarecycline Counseling: Patient advised regarding possible photosensitivity and discoloration of the teeth, skin, lips, tongue and gums.  Patient instructed to avoid sunlight, if possible.  When exposed to sunlight, patients should wear protective clothing, sunglasses, and sunscreen.  The patient was instructed to call the office immediately if the following severe adverse effects occur:  hearing changes, easy bruising/bleeding, severe headache, or vision changes.  The patient verbalized understanding of the proper use and possible adverse effects of sarecycline.  All of the patient's questions and concerns were addressed.
Cyclosporine Pregnancy And Lactation Text: This medication is Pregnancy Category C and it isn't know if it is safe during pregnancy. This medication is excreted in breast milk.
Infliximab Counseling:  I discussed with the patient the risks of infliximab including but not limited to myelosuppression, immunosuppression, autoimmune hepatitis, demyelinating diseases, lymphoma, and serious infections.  The patient understands that monitoring is required including a PPD at baseline and must alert us or the primary physician if symptoms of infection or other concerning signs are noted.
Stelara Pregnancy And Lactation Text: This medication is Pregnancy Category B and is considered safe during pregnancy. It is unknown if this medication is excreted in breast milk.
Topical Metronidazole Pregnancy And Lactation Text: This medication is Pregnancy Category B and considered safe during pregnancy.  It is also considered safe to use while breastfeeding.
Bactrim Pregnancy And Lactation Text: This medication is Pregnancy Category D and is known to cause fetal risk.  It is also excreted in breast milk.
Litfulo Pregnancy And Lactation Text: Based on animal studies, Lifulo may cause embryo-fetal harm when administered to pregnant women.  The medication should not be used in pregnancy.  Breastfeeding is not recommended during treatment.
Eucrisa Counseling: Patient may experience a mild burning sensation during topical application. Eucrisa is not approved in children less than 2 years of age.
Spironolactone Pregnancy And Lactation Text: This medication can cause feminization of the male fetus and should be avoided during pregnancy. The active metabolite is also found in breast milk.
Sarecycline Pregnancy And Lactation Text: This medication is Pregnancy Category D and not consider safe during pregnancy. It is also excreted in breast milk.
Oral Minoxidil Counseling- I discussed with the patient the risks of oral minoxidil including but not limited to shortness of breath, swelling of the feet or ankles, dizziness, lightheadedness, unwanted hair growth and allergic reaction.  The patient verbalized understanding of the proper use and possible adverse effects of oral minoxidil.  All of the patient's questions and concerns were addressed.
Low Dose Naltrexone Counseling- I discussed with the patient the potential risks and side effects of low dose naltrexone including but not limited to: more vivid dreams, headaches, nausea, vomiting, abdominal pain, fatigue, dizziness, and anxiety.
Bimzelx Pregnancy And Lactation Text: This medication crosses the placenta and the safety is uncertain during pregnancy. It is unknown if this medication is present in breast milk.
Qbrexza Counseling:  I discussed with the patient the risks of Qbrexza including but not limited to headache, mydriasis, blurred vision, dry eyes, nasal dryness, dry mouth, dry throat, dry skin, urinary hesitation, and constipation.  Local skin reactions including erythema, burning, stinging, and itching can also occur.
Eucrisa Pregnancy And Lactation Text: This medication has not been assigned a Pregnancy Risk Category but animal studies failed to show danger with the topical medication. It is unknown if the medication is excreted in breast milk.
Cephalexin Counseling: I counseled the patient regarding use of cephalexin as an antibiotic for prophylactic and/or therapeutic purposes. Cephalexin (commonly prescribed under brand name Keflex) is a cephalosporin antibiotic which is active against numerous classes of bacteria, including most skin bacteria. Side effects may include nausea, diarrhea, gastrointestinal upset, rash, hives, yeast infections, and in rare cases, hepatitis, kidney disease, seizures, fever, confusion, neurologic symptoms, and others. Patients with severe allergies to penicillin medications are cautioned that there is about a 10% incidence of cross-reactivity with cephalosporins. When possible, patients with penicillin allergies should use alternatives to cephalosporins for antibiotic therapy.
Olumiant Counseling: I discussed with the patient the risks of Olumiant therapy including but not limited to upper respiratory tract infections, shingles, cold sores, and nausea. Live vaccines should be avoided.  This medication has been linked to serious infections; higher rate of mortality; malignancy and lymphoproliferative disorders; major adverse cardiovascular events; thrombosis; gastrointestinal perforations; neutropenia; lymphopenia; anemia; liver enzyme elevations; and lipid elevations.
Taltz Counseling: I discussed with the patient the risks of ixekizumab including but not limited to immunosuppression, serious infections, worsening of inflammatory bowel disease and drug reactions.  The patient understands that monitoring is required including a PPD at baseline and must alert us or the primary physician if symptoms of infection or other concerning signs are noted.
Topical Steroids Counseling: I discussed with the patient that prolonged use of topical steroids can result in the increased appearance of superficial blood vessels (telangiectasias), lightening (hypopigmentation) and thinning of the skin (atrophy).  Patient understands to avoid using high potency steroids in skin folds, the groin or the face.  The patient verbalized understanding of the proper use and possible adverse effects of topical steroids.  All of the patient's questions and concerns were addressed.
Oral Minoxidil Pregnancy And Lactation Text: This medication should only be used when clearly needed if you are pregnant, attempting to become pregnant or breast feeding.
Methotrexate Counseling:  Patient counseled regarding adverse effects of methotrexate including but not limited to nausea, vomiting, abnormalities in liver function tests. Patients may develop mouth sores, rash, diarrhea, and abnormalities in blood counts. The patient understands that monitoring is required including LFT's and blood counts.  There is a rare possibility of scarring of the liver and lung problems that can occur when taking methotrexate. Persistent nausea, loss of appetite, pale stools, dark urine, cough, and shortness of breath should be reported immediately. Patient advised to discontinue methotrexate treatment at least three months before attempting to become pregnant.  I discussed the need for folate supplements while taking methotrexate.  These supplements can decrease side effects during methotrexate treatment. The patient verbalized understanding of the proper use and possible adverse effects of methotrexate.  All of the patient's questions and concerns were addressed.
Tetracycline Counseling: Patient counseled regarding possible photosensitivity and increased risk for sunburn.  Patient instructed to avoid sunlight, if possible.  When exposed to sunlight, patients should wear protective clothing, sunglasses, and sunscreen.  The patient was instructed to call the office immediately if the following severe adverse effects occur:  hearing changes, easy bruising/bleeding, severe headache, or vision changes.  The patient verbalized understanding of the proper use and possible adverse effects of tetracycline.  All of the patient's questions and concerns were addressed. Patient understands to avoid pregnancy while on therapy due to potential birth defects.
Low Dose Naltrexone Pregnancy And Lactation Text: Naltrexone is pregnancy category C.  There have been no adequate and well-controlled studies in pregnant women.  It should be used in pregnancy only if the potential benefit justifies the potential risk to the fetus.   Limited data indicates that naltrexone is minimally excreted into breastmilk.
Cimzia Counseling:  I discussed with the patient the risks of Cimzia including but not limited to immunosuppression, allergic reactions and infections.  The patient understands that monitoring is required including a PPD at baseline and must alert us or the primary physician if symptoms of infection or other concerning signs are noted.
Hydroquinone Counseling:  Patient advised that medication may result in skin irritation, lightening (hypopigmentation), dryness, and burning.  In the event of skin irritation, the patient was advised to reduce the amount of the drug applied or use it less frequently.  Rarely, spots that are treated with hydroquinone can become darker (pseudoochronosis).  Should this occur, patient instructed to stop medication and call the office. The patient verbalized understanding of the proper use and possible adverse effects of hydroquinone.  All of the patient's questions and concerns were addressed.
Albendazole Counseling:  I discussed with the patient the risks of albendazole including but not limited to cytopenia, kidney damage, nausea/vomiting and severe allergy.  The patient understands that this medication is being used in an off-label manner.
Qbrexza Pregnancy And Lactation Text: There is no available data on Qbrexza use in pregnant women.  There is no available data on Qbrexza use in lactation.
Cephalexin Pregnancy And Lactation Text: This medication is Pregnancy Category B and considered safe during pregnancy.  It is also excreted in breast milk but can be used safely for shorter doses.
Cantharidin Pregnancy And Lactation Text: This medication has not been proven safe during pregnancy. It is unknown if this medication is excreted in breast milk.
Olumiant Pregnancy And Lactation Text: Based on animal studies, Olumiant may cause embryo-fetal harm when administered to pregnant women.  The medication should not be used in pregnancy.  Breastfeeding is not recommended during treatment.
Vtama Pregnancy And Lactation Text: It is unknown if this medication can cause problems during pregnancy and breastfeeding.
Niacinamide Counseling: I recommended taking niacin or niacinamide, also know as vitamin B3, twice daily. Recent evidence suggests that taking vitamin B3 (500 mg twice daily) can reduce the risk of actinic keratoses and non-melanoma skin cancers. Side effects of vitamin B3 include flushing and headache.
Otezla Counseling: The side effects of Otezla were discussed with the patient, including but not limited to worsening or new depression, weight loss, diarrhea, nausea, upper respiratory tract infection, and headache. Patient instructed to call the office should any adverse effect occur.  The patient verbalized understanding of the proper use and possible adverse effects of Otezla.  All the patient's questions and concerns were addressed.
Methotrexate Pregnancy And Lactation Text: This medication is Pregnancy Category X and is known to cause fetal harm. This medication is excreted in breast milk.
Nemluvio Counseling: I discussed with the patient the risks of nemolizumab including but not limited to headache, gastrointestinal complaints, nasopharyngitis, musculoskeletal complaints, injection site reactions, and allergic reactions. The patient understands that monitoring is required and they must alert us or the primary physician if any side effects are noted.
Topical Steroids Applications Pregnancy And Lactation Text: Most topical steroids are considered safe to use during pregnancy and lactation.  Any topical steroid applied to the breast or nipple should be washed off before breastfeeding.
Erivedge Pregnancy And Lactation Text: This medication is Pregnancy Category X and is absolutely contraindicated during pregnancy. It is unknown if it is excreted in breast milk.
Azathioprine Pregnancy And Lactation Text: This medication is Pregnancy Category D and isn't considered safe during pregnancy. It is unknown if this medication is excreted in breast milk.
Finasteride Male Counseling: Finasteride Counseling:  I discussed with the patient the risks of use of finasteride including but not limited to decreased libido, decreased ejaculate volume, gynecomastia, and depression. Women should not handle medication.  All of the patient's questions and concerns were addressed.
Humira Counseling:  I discussed with the patient the risks of adalimumab including but not limited to myelosuppression, immunosuppression, autoimmune hepatitis, demyelinating diseases, lymphoma, and serious infections.  The patient understands that monitoring is required including a PPD at baseline and must alert us or the primary physician if symptoms of infection or other concerning signs are noted.
Tazorac Pregnancy And Lactation Text: This medication is not safe during pregnancy. It is unknown if this medication is excreted in breast milk.
5-Fu Counseling: 5-Fluorouracil Counseling:  I discussed with the patient the risks of 5-fluorouracil including but not limited to erythema, scaling, itching, weeping, crusting, and pain.
Bexarotene Counseling:  I discussed with the patient the risks of bexarotene including but not limited to hair loss, dry lips/skin/eyes, liver abnormalities, hyperlipidemia, pancreatitis, depression/suicidal ideation, photosensitivity, drug rash/allergic reactions, hypothyroidism, anemia, leukopenia, infection, cataracts, and teratogenicity.  Patient understands that they will need regular blood tests to check lipid profile, liver function tests, white blood cell count, thyroid function tests and pregnancy test if applicable.
Opzelura Counseling:  I discussed with the patient the risks of Opzelura including but not limited to nasopharngitis, bronchitis, ear infection, eosinophila, hives, diarrhea, folliculitis, tonsillitis, and rhinorrhea.  Taken orally, this medication has been linked to serious infections; higher rate of mortality; malignancy and lymphoproliferative disorders; major adverse cardiovascular events; thrombosis; thrombocytopenia, anemia, and neutropenia; and lipid elevations.
Gabapentin Counseling: I discussed with the patient the risks of gabapentin including but not limited to dizziness, somnolence, fatigue and ataxia.
Semaglutide Counseling: I reviewed the possible side effects including: thyroid tumors, kidney disease, gallbladder disease, abdominal pain, constipation, diarrhea, nausea, vomiting and pancreatitis. Do not take this medication if you have a history or family history of multiple endocrine neoplasia syndrome type 2. Side effects reviewed, pt to contact office should one occur.
Skyrizi Counseling: I discussed with the patient the risks of risankizumab-rzaa including but not limited to immunosuppression, and serious infections.  The patient understands that monitoring is required including a PPD at baseline and must alert us or the primary physician if symptoms of infection or other concerning signs are noted.
Tranexamic Acid Counseling:  Patient advised of the small risk of bleeding problems with tranexamic acid. They were also instructed to call if they developed any nausea, vomiting or diarrhea. All of the patient's questions and concerns were addressed.
5-Fu Pregnancy And Lactation Text: This medication is Pregnancy Category X and contraindicated in pregnancy and in women who may become pregnant. It is unknown if this medication is excreted in breast milk.
Libtayo Counseling- I discussed with the patient the risks of Libtayo including but not limited to nausea, vomiting, diarrhea, and bone or muscle pain.  The patient verbalized understanding of the proper use and possible adverse effects of Libtayo.  All of the patient's questions and concerns were addressed.
Aklief Pregnancy And Lactation Text: It is unknown if this medication is safe to use during pregnancy.  It is unknown if this medication is excreted in breast milk.  Breastfeeding women should use the topical cream on the smallest area of the skin for the shortest time needed while breastfeeding.  Do not apply to nipple and areola.
Topical Clindamycin Counseling: Patient counseled that this medication may cause skin irritation or allergic reactions.  In the event of skin irritation, the patient was advised to reduce the amount of the drug applied or use it less frequently.   The patient verbalized understanding of the proper use and possible adverse effects of clindamycin.  All of the patient's questions and concerns were addressed.
Finasteride Female Counseling: Finasteride Counseling:  I discussed with the patient the risks of use of finasteride including but not limited to decreased libido and sexual dysfunction. Explained the teratogenic nature of the medication and stressed the importance of not getting pregnant during treatment. All of the patient's questions and concerns were addressed.
Cellcept Counseling:  I discussed with the patient the risks of mycophenolate mofetil including but not limited to infection/immunosuppression, GI upset, hypokalemia, hypercholesterolemia, bone marrow suppression, lymphoproliferative disorders, malignancy, GI ulceration/bleed/perforation, colitis, interstitial lung disease, kidney failure, progressive multifocal leukoencephalopathy, and birth defects.  The patient understands that monitoring is required including a baseline creatinine and regular CBC testing. In addition, patient must alert us immediately if symptoms of infection or other concerning signs are noted.
Quinolones Counseling:  I discussed with the patient the risks of fluoroquinolones including but not limited to GI upset, allergic reaction, drug rash, diarrhea, dizziness, photosensitivity, yeast infections, liver function test abnormalities, tendonitis/tendon rupture.
Azelaic Acid Counseling: Patient counseled that medicine may cause skin irritation and to avoid applying near the eyes.  In the event of skin irritation, the patient was advised to reduce the amount of the drug applied or use it less frequently.   The patient verbalized understanding of the proper use and possible adverse effects of azelaic acid.  All of the patient's questions and concerns were addressed.
Gabapentin Pregnancy And Lactation Text: This medication is Pregnancy Category C and isn't considered safe during pregnancy. It is excreted in breast milk.
Tranexamic Acid Pregnancy And Lactation Text: It is unknown if this medication is safe during pregnancy or breast feeding.
Fluconazole Counseling:  Patient counseled regarding adverse effects of fluconazole including but not limited to headache, diarrhea, nausea, upset stomach, liver function test abnormalities, taste disturbance, and stomach pain.  There is a rare possibility of liver failure that can occur when taking fluconazole.  The patient understands that monitoring of LFTs and kidney function test may be required, especially at baseline. The patient verbalized understanding of the proper use and possible adverse effects of fluconazole.  All of the patient's questions and concerns were addressed.
Detail Level: Simple
Opzelura Pregnancy And Lactation Text: There is insufficient data to evaluate drug-associated risk for major birth defects, miscarriage, or other adverse maternal or fetal outcomes.  There is a pregnancy registry that monitors pregnancy outcomes in pregnant persons exposed to the medication during pregnancy.  It is unknown if this medication is excreted in breast milk.  Do not breastfeed during treatment and for about 4 weeks after the last dose.
Bexarotene Pregnancy And Lactation Text: This medication is Pregnancy Category X and should not be given to women who are pregnant or may become pregnant. This medication should not be used if you are breast feeding.
Hyrimoz Counseling:  I discussed with the patient the risks of adalimumab including but not limited to myelosuppression, immunosuppression, autoimmune hepatitis, demyelinating diseases, lymphoma, and serious infections.  The patient understands that monitoring is required including a PPD at baseline and must alert us or the primary physician if symptoms of infection or other concerning signs are noted.
Libtayo Pregnancy And Lactation Text: This medication is contraindicated in pregnancy and when breast feeding.
Drysol Counseling:  I discussed with the patient the risks of drysol/aluminum chloride including but not limited to skin rash, itching, irritation, burning.
Wegovy Counseling: I reviewed the possible side effects including: thyroid tumors, kidney disease, gallbladder disease, abdominal pain, constipation, diarrhea, nausea, vomiting and pancreatitis. Do not take this medication if you have a history or family history of multiple endocrine neoplasia syndrome type 2. Side effects reviewed, pt to contact office should one occur.
Fluconazole Pregnancy And Lactation Text: This medication is Pregnancy Category C and it isn't know if it is safe during pregnancy. It is also excreted in breast milk.
Glycopyrrolate Counseling:  I discussed with the patient the risks of glycopyrrolate including but not limited to skin rash, drowsiness, dry mouth, difficulty urinating, and blurred vision.
Finasteride Pregnancy And Lactation Text: This medication is absolutely contraindicated during pregnancy. It is unknown if it is excreted in breast milk.
Isotretinoin Counseling: Patient should get monthly blood tests, not donate blood, not drive at night if vision affected, not share medication, and not undergo elective surgery for 6 months after tx completed. Side effects reviewed, pt to contact office should one occur.
Picato Counseling:  I discussed with the patient the risks of Picato including but not limited to erythema, scaling, itching, weeping, crusting, and pain.
Azithromycin Counseling:  I discussed with the patient the risks of azithromycin including but not limited to GI upset, allergic reaction, drug rash, diarrhea, and yeast infections.
Cibinqo Counseling: I discussed with the patient the risks of Cibinqo therapy including but not limited to common cold, nausea, headache, cold sores, increased blood CPK levels, dizziness, UTIs, fatigue, acne, and vomitting. Live vaccines should be avoided.  This medication has been linked to serious infections; higher rate of mortality; malignancy and lymphoproliferative disorders; major adverse cardiovascular events; thrombosis; thrombocytopenia and lymphopenia; lipid elevations; and retinal detachment.
Drysol Pregnancy And Lactation Text: This medication is considered safe during pregnancy and breast feeding.
Topical Ketoconazole Counseling: Patient counseled that this medication may cause skin irritation or allergic reactions.  In the event of skin irritation, the patient was advised to reduce the amount of the drug applied or use it less frequently.   The patient verbalized understanding of the proper use and possible adverse effects of ketoconazole.  All of the patient's questions and concerns were addressed.
Odomzo Counseling- I discussed with the patient the risks of Odomzo including but not limited to nausea, vomiting, diarrhea, constipation, weight loss, changes in the sense of taste, decreased appetite, muscle spasms, and hair loss.  The patient verbalized understanding of the proper use and possible adverse effects of Odomzo.  All of the patient's questions and concerns were addressed.
Cyclophosphamide Counseling:  I discussed with the patient the risks of cyclophosphamide including but not limited to hair loss, hormonal abnormalities, decreased fertility, abdominal pain, diarrhea, nausea and vomiting, bone marrow suppression and infection. The patient understands that monitoring is required while taking this medication.
Griseofulvin Counseling:  I discussed with the patient the risks of griseofulvin including but not limited to photosensitivity, cytopenia, liver damage, nausea/vomiting and severe allergy.  The patient understands that this medication is best absorbed when taken with a fatty meal (e.g., ice cream or french fries).
Spevigo Counseling: I discussed with the patient the risks of Spevigo including but not limited to fatigue, nasuea, vomiting, headache, pruritus, urinary tract infection, an infusion related reactions.  The patient understands that monitoring is required including screening for tuberculosis at baseline and yearly screening thereafter while continuing Spevigo therapy. They should contact us if symptoms of infection or other concerning signs are noted.
Valtrex Counseling: I discussed with the patient the risks of valacyclovir including but not limited to kidney damage, nausea, vomiting and severe allergy.  The patient understands that if the infection seems to be worsening or is not improving, they are to call.
High Dose Vitamin A Pregnancy And Lactation Text: High dose vitamin A therapy is contraindicated during pregnancy and breast feeding.
Adbry Counseling: I discussed with the patient the risks of tralokinumab including but not limited to eye infection and irritation, cold sores, injection site reactions, worsening of asthma, allergic reactions and increased risk of parasitic infection.  Live vaccines should be avoided while taking tralokinumab. The patient understands that monitoring is required and they must alert us or the primary physician if symptoms of infection or other concerning signs are noted.
Glycopyrrolate Pregnancy And Lactation Text: This medication is Pregnancy Category B and is considered safe during pregnancy. It is unknown if it is excreted breast milk.
Birth Control Pills Counseling: Birth Control Pill Counseling: I discussed with the patient the potential side effects of OCPs including but not limited to increased risk of stroke, heart attack, thrombophlebitis, deep venous thrombosis, hepatic adenomas, breast changes, GI upset, headaches, and depression.  The patient verbalized understanding of the proper use and possible adverse effects of OCPs. All of the patient's questions and concerns were addressed.
Isotretinoin Pregnancy And Lactation Text: This medication is Pregnancy Category X and is considered extremely dangerous during pregnancy. It is unknown if it is excreted in breast milk.
Cibinqo Pregnancy And Lactation Text: It is unknown if this medication will adversely affect pregnancy or breast feeding.  You should not take this medication if you are currently pregnant or planning a pregnancy or while breastfeeding.
Elidel Counseling: Patient may experience a mild burning sensation during topical application. Elidel is not approved in children less than 2 years of age. There have been case reports of hematologic and skin malignancies in patients using topical calcineurin inhibitors although causality is questionable.
Rifampin Counseling: I discussed with the patient the risks of rifampin including but not limited to liver damage, kidney damage, red-orange body fluids, nausea/vomiting and severe allergy.
Xeljanz Counseling: I discussed with the patient the risks of Xeljanz therapy including increased risk of infection, liver issues, headache, diarrhea, or cold symptoms. Live vaccines should be avoided. They were instructed to call if they have any problems.
Klisyri Pregnancy And Lactation Text: It is unknown if this medication can harm a developing fetus or if it is excreted in breast milk.
Propranolol Pregnancy And Lactation Text: This medication is Pregnancy Category C and it isn't known if it is safe during pregnancy. It is excreted in breast milk.
Erythromycin Counseling:  I discussed with the patient the risks of erythromycin including but not limited to GI upset, allergic reaction, drug rash, diarrhea, increase in liver enzymes, and yeast infections.
Ebglyss Counseling: I discussed with the patient the risks of lebrikizumab including but not limited to eye inflammation and irritation, cold sores, injection site reactions, allergic reactions and increased risk of parasitic infection. The patient understands that monitoring is required and they must alert us or the primary physician if symptoms of infection or other concerning signs are noted.
Soolantra Pregnancy And Lactation Text: This medication is Pregnancy Category C. This medication is considered safe during breast feeding.
Ozempic Counseling: I reviewed the possible side effects including: thyroid tumors, kidney disease, gallbladder disease, abdominal pain, constipation, diarrhea, nausea, vomiting and pancreatitis. Do not take this medication if you have a history or family history of multiple endocrine neoplasia syndrome type 2. Side effects reviewed, pt to contact office should one occur.
Winlevi Counseling:  I discussed with the patient the risks of topical clascoterone including but not limited to erythema, scaling, itching, and stinging. Patient voiced their understanding.
Colchicine Counseling:  Patient counseled regarding adverse effects including but not limited to stomach upset (nausea, vomiting, stomach pain, or diarrhea).  Patient instructed to limit alcohol consumption while taking this medication.  Colchicine may reduce blood counts especially with prolonged use.  The patient understands that monitoring of kidney function and blood counts may be required, especially at baseline. The patient verbalized understanding of the proper use and possible adverse effects of colchicine.  All of the patient's questions and concerns were addressed.
Ketoconazole Counseling:   Patient counseled regarding improving absorption with orange juice.  Adverse effects include but are not limited to breast enlargement, headache, diarrhea, nausea, upset stomach, liver function test abnormalities, taste disturbance, and stomach pain.  There is a rare possibility of liver failure that can occur when taking ketoconazole. The patient understands that monitoring of LFTs may be required, especially at baseline. The patient verbalized understanding of the proper use and possible adverse effects of ketoconazole.  All of the patient's questions and concerns were addressed.
Minoxidil Counseling: Minoxidil is a topical medication which can increase blood flow where it is applied. It is uncertain how this medication increases hair growth. Side effects are uncommon and include stinging and allergic reactions.
Hydroxyzine Counseling: Patient advised that the medication is sedating and not to drive a car after taking this medication.  Patient informed of potential adverse effects including but not limited to dry mouth, urinary retention, and blurry vision.  The patient verbalized understanding of the proper use and possible adverse effects of hydroxyzine.  All of the patient's questions and concerns were addressed.
Calcipotriene Counseling:  I discussed with the patient the risks of calcipotriene including but not limited to erythema, scaling, itching, and irritation.
SSKI Counseling:  I discussed with the patient the risks of SSKI including but not limited to thyroid abnormalities, metallic taste, GI upset, fever, headache, acne, arthralgias, paraesthesias, lymphadenopathy, easy bleeding, arrhythmias, and allergic reaction.
Winlevi Pregnancy And Lactation Text: This medication is considered safe during pregnancy and breastfeeding.
Dutasteride Male Counseling: Dustasteride Counseling:  I discussed with the patient the risks of use of dutasteride including but not limited to decreased libido, decreased ejaculate volume, and gynecomastia. Women who can become pregnant should not handle medication.  All of the patient's questions and concerns were addressed.
Erythromycin Pregnancy And Lactation Text: This medication is Pregnancy Category B and is considered safe during pregnancy. It is also excreted in breast milk.
Simlandi Counseling:  I discussed with the patient the risks of adalimumab including but not limited to myelosuppression, immunosuppression, autoimmune hepatitis, demyelinating diseases, lymphoma, and serious infections.  The patient understands that monitoring is required including a PPD at baseline and must alert us or the primary physician if symptoms of infection or other concerning signs are noted.
Topical Retinoid counseling:  Patient advised to apply a pea-sized amount only at bedtime and wait 30 minutes after washing their face before applying.  If too drying, patient may add a non-comedogenic moisturizer. The patient verbalized understanding of the proper use and possible adverse effects of retinoids.  All of the patient's questions and concerns were addressed.
Ebglyss Pregnancy And Lactation Text: This medication likely crosses the placenta but the risk for the fetus is uncertain. It is unknown if this medication is excreted in breast milk.
Hydroxyzine Pregnancy And Lactation Text: This medication is not safe during pregnancy and should not be taken. It is also excreted in breast milk and breast feeding isn't recommended.
Calcipotriene Pregnancy And Lactation Text: The use of this medication during pregnancy or lactation is not recommended as there is insufficient data.
Metronidazole Counseling:  I discussed with the patient the risks of metronidazole including but not limited to seizures, nausea/vomiting, a metallic taste in the mouth, nausea/vomiting and severe allergy.
Ketoconazole Pregnancy And Lactation Text: This medication is Pregnancy Category C and it isn't know if it is safe during pregnancy. It is also excreted in breast milk and breast feeding isn't recommended.
Enbrel Counseling:  I discussed with the patient the risks of etanercept including but not limited to myelosuppression, immunosuppression, autoimmune hepatitis, demyelinating diseases, lymphoma, and infections.  The patient understands that monitoring is required including a PPD at baseline and must alert us or the primary physician if symptoms of infection or other concerning signs are noted.
Dutasteride Female Counseling: Dutasteride Counseling:  I discussed with the patient the risks of use of dutasteride including but not limited to decreased libido and sexual dysfunction. Explained the teratogenic nature of the medication and stressed the importance of not getting pregnant during treatment. All of the patient's questions and concerns were addressed.
VTAMA Counseling: I discussed with the patient that VTAMA is not for use in the eyes, mouth or mouth. They should call the office if they develop any signs of allergic reactions to VTAMA. The patient verbalized understanding of the proper use and possible adverse effects of VTAMA.  All of the patient's questions and concerns were addressed.
Sski Pregnancy And Lactation Text: This medication is Pregnancy Category D and isn't considered safe during pregnancy. It is excreted in breast milk.
Mirvaso Counseling: Mirvaso is a topical medication which can decrease superficial blood flow where applied. Side effects are uncommon and include stinging, redness and allergic reactions.
Acitretin Counseling:  I discussed with the patient the risks of acitretin including but not limited to hair loss, dry lips/skin/eyes, liver damage, hyperlipidemia, depression/suicidal ideation, photosensitivity.  Serious rare side effects can include but are not limited to pancreatitis, pseudotumor cerebri, bony changes, clot formation/stroke/heart attack.  Patient understands that alcohol is contraindicated since it can result in liver toxicity and significantly prolong the elimination of the drug by many years.
Dapsone Counseling: I discussed with the patient the risks of dapsone including but not limited to hemolytic anemia, agranulocytosis, rashes, methemoglobinemia, kidney failure, peripheral neuropathy, headaches, GI upset, and liver toxicity.  Patients who start dapsone require monitoring including baseline LFTs and weekly CBCs for the first month, then every month thereafter.  The patient verbalized understanding of the proper use and possible adverse effects of dapsone.  All of the patient's questions and concerns were addressed.
Opioid Counseling: I discussed with the patient the potential side effects of opioids including but not limited to addiction, altered mental status, and depression. I stressed avoiding alcohol, benzodiazepines, muscle relaxants and sleep aids unless specifically okayed by a physician. The patient verbalized understanding of the proper use and possible adverse effects of opioids. All of the patient's questions and concerns were addressed. They were instructed to flush the remaining pills down the toilet if they did not need them for pain.
Saxenda Counseling: I reviewed the possible side effects including: thyroid tumors, kidney disease, gallbladder disease, abdominal pain, constipation, diarrhea, nausea, vomiting and pancreatitis. Do not take this medication if you have a history or family history of multiple endocrine neoplasia syndrome type 2. Side effects reviewed, pt to contact office should one occur.
Cantharidin Counseling:  I discussed with the patient the risks of Cantharidin including but not limited to pain, redness, burning, itching, and blistering.
Simponi Counseling:  I discussed with the patient the risks of golimumab including but not limited to myelosuppression, immunosuppression, autoimmune hepatitis, demyelinating diseases, lymphoma, and serious infections.  The patient understands that monitoring is required including a PPD at baseline and must alert us or the primary physician if symptoms of infection or other concerning signs are noted.
Metronidazole Pregnancy And Lactation Text: This medication is Pregnancy Category B and considered safe during pregnancy.  It is also excreted in breast milk.
Terbinafine Counseling: Patient counseling regarding adverse effects of terbinafine including but not limited to headache, diarrhea, rash, upset stomach, liver function test abnormalities, itching, taste/smell disturbance, nausea, abdominal pain, and flatulence.  There is a rare possibility of liver failure that can occur when taking terbinafine.  The patient understands that a baseline LFT and kidney function test may be required. The patient verbalized understanding of the proper use and possible adverse effects of terbinafine.  All of the patient's questions and concerns were addressed.
Azathioprine Counseling:  I discussed with the patient the risks of azathioprine including but not limited to myelosuppression, immunosuppression, hepatotoxicity, lymphoma, and infections.  The patient understands that monitoring is required including baseline LFTs, Creatinine, possible TPMP genotyping and weekly CBCs for the first month and then every 2 weeks thereafter.  The patient verbalized understanding of the proper use and possible adverse effects of azathioprine.  All of the patient's questions and concerns were addressed.
Dutasteride Pregnancy And Lactation Text: This medication is absolutely contraindicated in women, especially during pregnancy and breast feeding. Feminization of male fetuses is possible if taking while pregnant.
Thalidomide Counseling: I discussed with the patient the risks of thalidomide including but not limited to birth defects, anxiety, weakness, chest pain, dizziness, cough and severe allergy.
Dapsone Pregnancy And Lactation Text: This medication is Pregnancy Category C and is not considered safe during pregnancy or breast feeding.
Aklief counseling:  Patient advised to apply a pea-sized amount only at bedtime and wait 30 minutes after washing their face before applying.  If too drying, patient may add a non-comedogenic moisturizer.  The most commonly reported side effects including irritation, redness, scaling, dryness, stinging, burning, itching, and increased risk of sunburn.  The patient verbalized understanding of the proper use and possible adverse effects of retinoids.  All of the patient's questions and concerns were addressed.
Tazorac Counseling:  Patient advised that medication is irritating and drying.  Patient may need to apply sparingly and wash off after an hour before eventually leaving it on overnight.  The patient verbalized understanding of the proper use and possible adverse effects of tazorac.  All of the patient's questions and concerns were addressed.
Mirvaso Pregnancy And Lactation Text: This medication has not been assigned a Pregnancy Risk Category. It is unknown if the medication is excreted in breast milk.
Minocycline Counseling: Patient advised regarding possible photosensitivity and discoloration of the teeth, skin, lips, tongue and gums.  Patient instructed to avoid sunlight, if possible.  When exposed to sunlight, patients should wear protective clothing, sunglasses, and sunscreen.  The patient was instructed to call the office immediately if the following severe adverse effects occur:  hearing changes, easy bruising/bleeding, severe headache, or vision changes.  The patient verbalized understanding of the proper use and possible adverse effects of minocycline.  All of the patient's questions and concerns were addressed.
Acitretin Pregnancy And Lactation Text: This medication is Pregnancy Category X and should not be given to women who are pregnant or may become pregnant in the future. This medication is excreted in breast milk.
Opioid Pregnancy And Lactation Text: These medications can lead to premature delivery and should be avoided during pregnancy. These medications are also present in breast milk in small amounts.
Terbinafine Pregnancy And Lactation Text: This medication is Pregnancy Category B and is considered safe during pregnancy. It is also excreted in breast milk and breast feeding isn't recommended.
Rhofade Counseling: Rhofade is a topical medication which can decrease superficial blood flow where applied. Side effects are uncommon and include stinging, redness and allergic reactions.
Cimzia Pregnancy And Lactation Text: This medication crosses the placenta but can be considered safe in certain situations. Cimzia may be excreted in breast milk.
Rinvoq Counseling: I discussed with the patient the risks of Rinvoq therapy including but not limited to upper respiratory tract infections, shingles, cold sores, bronchitis, nausea, cough, fever, acne, and headache. Live vaccines should be avoided.  This medication has been linked to serious infections; higher rate of mortality; malignancy and lymphoproliferative disorders; major adverse cardiovascular events; thrombosis; thrombocytopenia, anemia, and neutropenia; lipid elevations; liver enzyme elevations; and gastrointestinal perforations.
Albendazole Pregnancy And Lactation Text: This medication is Pregnancy Category C and it isn't known if it is safe during pregnancy. It is also excreted in breast milk.
Otezla Pregnancy And Lactation Text: This medication is Pregnancy Category C and it isn't known if it is safe during pregnancy. It is unknown if it is excreted in breast milk.
Prednisone Counseling:  I discussed with the patient the risks of prolonged use of prednisone including but not limited to weight gain, insomnia, osteoporosis, mood changes, diabetes, susceptibility to infection, glaucoma and high blood pressure.  In cases where prednisone use is prolonged, patients should be monitored with blood pressure checks, serum glucose levels and an eye exam.  Additionally, the patient may need to be placed on GI prophylaxis, PCP prophylaxis, and calcium and vitamin D supplementation and/or a bisphosphonate.  The patient verbalized understanding of the proper use and the possible adverse effects of prednisone.  All of the patient's questions and concerns were addressed.
Nemluvio Pregnancy And Lactation Text: It is not known if Nemluvio causes fetal harm or is present in breast milk. Please proceed with caution if patients who are pregnant or breastfeeding.
Zoryve Counseling:  I discussed with the patient that Zoryve is not for use in the eyes, mouth or vagina. The most commonly reported side effects include diarrhea, headache, insomnia, application site pain, upper respiratory tract infections, and urinary tract infections.  All of the patient's questions and concerns were addressed.
Clindamycin Counseling: I counseled the patient regarding use of clindamycin as an antibiotic for prophylactic and/or therapeutic purposes. Clindamycin is active against numerous classes of bacteria, including skin bacteria. Side effects may include nausea, diarrhea, gastrointestinal upset, rash, hives, yeast infections, and in rare cases, colitis.
Niacinamide Pregnancy And Lactation Text: These medications are considered safe during pregnancy.
Cosentyx Counseling:  I discussed with the patient the risks of Cosentyx including but not limited to worsening of Crohn's disease, immunosuppression, allergic reactions and infections.  The patient understands that monitoring is required including a PPD at baseline and must alert us or the primary physician if symptoms of infection or other concerning signs are noted.
Topical Sulfur Applications Counseling: Topical Sulfur Counseling: Patient counseled that this medication may cause skin irritation or allergic reactions.  In the event of skin irritation, the patient was advised to reduce the amount of the drug applied or use it less frequently.   The patient verbalized understanding of the proper use and possible adverse effects of topical sulfur application.  All of the patient's questions and concerns were addressed.
Tremfya Counseling: I discussed with the patient the risks of guselkumab including but not limited to immunosuppression, serious infections, worsening of inflammatory bowel disease and drug reactions.  The patient understands that monitoring is required including a PPD at baseline and must alert us or the primary physician if symptoms of infection or other concerning signs are noted.
Arava Counseling:  Patient counseled regarding adverse effects of Arava including but not limited to nausea, vomiting, abnormalities in liver function tests. Patients may develop mouth sores, rash, diarrhea, and abnormalities in blood counts. The patient understands that monitoring is required including LFTs and blood counts.  There is a rare possibility of scarring of the liver and lung problems that can occur when taking methotrexate. Persistent nausea, loss of appetite, pale stools, dark urine, cough, and shortness of breath should be reported immediately. Patient advised to discontinue Arava treatment and consult with a physician prior to attempting conception. The patient will have to undergo a treatment to eliminate Arava from the body prior to conception.
Ivermectin Counseling:  Patient instructed to take medication on an empty stomach with a full glass of water.  Patient informed of potential adverse effects including but not limited to nausea, diarrhea, dizziness, itching, and swelling of the extremities or lymph nodes.  The patient verbalized understanding of the proper use and possible adverse effects of ivermectin.  All of the patient's questions and concerns were addressed.
Cimetidine Counseling:  I discussed with the patient the risks of Cimetidine including but not limited to gynecomastia, headache, diarrhea, nausea, drowsiness, arrhythmias, pancreatitis, skin rashes, psychosis, bone marrow suppression and kidney toxicity.
Imiquimod Counseling:  I discussed with the patient the risks of imiquimod including but not limited to erythema, scaling, itching, weeping, crusting, and pain.  Patient understands that the inflammatory response to imiquimod is variable from person to person and was educated regarded proper titration schedule.  If flu-like symptoms develop, patient knows to discontinue the medication and contact us.
Benzoyl Peroxide Counseling: Patient counseled that medicine may cause skin irritation and bleach clothing.  In the event of skin irritation, the patient was advised to reduce the amount of the drug applied or use it less frequently.   The patient verbalized understanding of the proper use and possible adverse effects of benzoyl peroxide.  All of the patient's questions and concerns were addressed.
Nsaids Counseling: NSAID Counseling: I discussed with the patient that NSAIDs should be taken with food. Prolonged use of NSAIDs can result in the development of stomach ulcers.  Patient advised to stop taking NSAIDs if abdominal pain occurs.  The patient verbalized understanding of the proper use and possible adverse effects of NSAIDs.  All of the patient's questions and concerns were addressed.
Topical Sulfur Applications Pregnancy And Lactation Text: This medication is Pregnancy Category C and has an unknown safety profile during pregnancy. It is unknown if this topical medication is excreted in breast milk.
Rituxan Counseling:  I discussed with the patient the risks of Rituxan infusions. Side effects can include infusion reactions, severe drug rashes including mucocutaneous reactions, reactivation of latent hepatitis and other infections and rarely progressive multifocal leukoencephalopathy.  All of the patient's questions and concerns were addressed.
Clindamycin Pregnancy And Lactation Text: This medication can be used in pregnancy if certain situations. Clindamycin is also present in breast milk.
Rinvoq Pregnancy And Lactation Text: Based on animal studies, Rinvoq may cause embryo-fetal harm when administered to pregnant women.  The medication should not be used in pregnancy.  Breastfeeding is not recommended during treatment and for 6 days after the last dose.
Solaraze Counseling:  I discussed with the patient the risks of Solaraze including but not limited to erythema, scaling, itching, weeping, crusting, and pain.
Oxybutynin Counseling:  I discussed with the patient the risks of oxybutynin including but not limited to skin rash, drowsiness, dry mouth, difficulty urinating, and blurred vision.
Zyclara Counseling:  I discussed with the patient the risks of imiquimod including but not limited to erythema, scaling, itching, weeping, crusting, and pain.  Patient understands that the inflammatory response to imiquimod is variable from person to person and was educated regarded proper titration schedule.  If flu-like symptoms develop, patient knows to discontinue the medication and contact us.
Doxycycline Counseling:  Patient counseled regarding possible photosensitivity and increased risk for sunburn.  Patient instructed to avoid sunlight, if possible.  When exposed to sunlight, patients should wear protective clothing, sunglasses, and sunscreen.  The patient was instructed to call the office immediately if the following severe adverse effects occur:  hearing changes, easy bruising/bleeding, severe headache, or vision changes.  The patient verbalized understanding of the proper use and possible adverse effects of doxycycline.  All of the patient's questions and concerns were addressed.
Rituxan Pregnancy And Lactation Text: This medication is Pregnancy Category C and it isn't know if it is safe during pregnancy. It is unknown if this medication is excreted in breast milk but similar antibodies are known to be excreted.
Griseofulvin Pregnancy And Lactation Text: This medication is Pregnancy Category X and is known to cause serious birth defects. It is unknown if this medication is excreted in breast milk but breast feeding should be avoided.
Sotyktu Counseling:  I discussed the most common side effects of Sotyktu including: common cold, sore throat, sinus infections, cold sores, canker sores, folliculitis, and acne.  I also discussed more serious side effects of Sotyktu including but not limited to: serious allergic reactions; increased risk for infections such as TB; cancers such as lymphomas; rhabdomyolysis and elevated CPK; and elevated triglycerides and liver enzymes. 
Wartpeel Counseling:  I discussed with the patient the risks of Wartpeel including but not limited to erythema, scaling, itching, weeping, crusting, and pain.
Xolair Counseling:  Patient informed of potential adverse effects including but not limited to fever, muscle aches, rash and allergic reactions.  The patient verbalized understanding of the proper use and possible adverse effects of Xolair.  All of the patient's questions and concerns were addressed.
Benzoyl Peroxide Pregnancy And Lactation Text: This medication is Pregnancy Category C. It is unknown if benzoyl peroxide is excreted in breast milk.
Dupixent Counseling: I discussed with the patient the risks of dupilumab including but not limited to eye infection and irritation, cold sores, injection site reactions, worsening of asthma, allergic reactions and increased risk of parasitic infection.  Live vaccines should be avoided while taking dupilumab. Dupilumab will also interact with certain medications such as warfarin and cyclosporine. The patient understands that monitoring is required and they must alert us or the primary physician if symptoms of infection or other concerning signs are noted.
Nsaids Pregnancy And Lactation Text: These medications are considered safe up to 30 weeks gestation. It is excreted in breast milk.
Klisyri Counseling:  I discussed with the patient the risks of Klisyri including but not limited to erythema, scaling, itching, weeping, crusting, and pain.
Doxepin Counseling:  Patient advised that the medication is sedating and not to drive a car after taking this medication. Patient informed of potential adverse effects including but not limited to dry mouth, urinary retention, and blurry vision.  The patient verbalized understanding of the proper use and possible adverse effects of doxepin.  All of the patient's questions and concerns were addressed.
Clofazimine Counseling:  I discussed with the patient the risks of clofazimine including but not limited to skin and eye pigmentation, liver damage, nausea/vomiting, gastrointestinal bleeding and allergy.
Solaraze Pregnancy And Lactation Text: This medication is Pregnancy Category B and is considered safe. There is some data to suggest avoiding during the third trimester. It is unknown if this medication is excreted in breast milk.
Siliq Counseling:  I discussed with the patient the risks of Siliq including but not limited to new or worsening depression, suicidal thoughts and behavior, immunosuppression, malignancy, posterior leukoencephalopathy syndrome, and serious infections.  The patient understands that monitoring is required including a PPD at baseline and must alert us or the primary physician if symptoms of infection or other concerning signs are noted. There is also a special program designed to monitor depression which is required with Siliq.
Doxycycline Pregnancy And Lactation Text: This medication is Pregnancy Category D and not consider safe during pregnancy. It is also excreted in breast milk but is considered safe for shorter treatment courses.
Sotyktu Pregnancy And Lactation Text: There is insufficient data to evaluate whether or not Sotyktu is safe to use during pregnancy.   It is not known if Sotyktu passes into breast milk and whether or not it is safe to use when breastfeeding.  
Itraconazole Counseling:  I discussed with the patient the risks of itraconazole including but not limited to liver damage, nausea/vomiting, neuropathy, and severe allergy.  The patient understands that this medication is best absorbed when taken with acidic beverages such as non-diet cola or ginger ale.  The patient understands that monitoring is required including baseline LFTs and repeat LFTs at intervals.  The patient understands that they are to contact us or the primary physician if concerning signs are noted.
Xolair Pregnancy And Lactation Text: This medication is Pregnancy Category B and is considered safe during pregnancy. This medication is excreted in breast milk.
Carac Counseling:  I discussed with the patient the risks of Carac including but not limited to erythema, scaling, itching, weeping, crusting, and pain.
Propranolol Counseling:  I discussed with the patient the risks of propranolol including but not limited to low heart rate, low blood pressure, low blood sugar, restlessness and increased cold sensitivity. They should call the office if they experience any of these side effects.
Doxepin Pregnancy And Lactation Text: This medication is Pregnancy Category C and it isn't known if it is safe during pregnancy. It is also excreted in breast milk and breast feeding isn't recommended.
Soolantra Counseling: I discussed with the patients the risks of topial Soolantra. This is a medicine which decreases the number of mites and inflammation in the skin. You experience burning, stinging, eye irritation or allergic reactions.  Please call our office if you develop any problems from using this medication.
Dupixent Pregnancy And Lactation Text: This medication likely crosses the placenta but the risk for the fetus is uncertain. This medication is excreted in breast milk.

## 2025-01-03 NOTE — PROCEDURE: CONSULTATION FOR MOHS SURGERY
Detail Level: Detailed
Body Location Override (Optional - Billing Will Still Be Based On Selected Body Map Location If Applicable): Left radial wrist
Size Of Lesion: 0.8
X Size Of Lesion In Cm (Optional): 0.9
Name Of The Referring Provider For Procedure: Marlene Echevarria PA-C
Incorporate Mauc In Note: Yes
Location Indication Override (Is Already Calculated Based On Selected Body Location): Area H

## 2025-01-15 ENCOUNTER — APPOINTMENT (OUTPATIENT)
Dept: URBAN - METROPOLITAN AREA CLINIC 126 | Facility: CLINIC | Age: OVER 89
Setting detail: DERMATOLOGY
End: 2025-01-15

## 2025-01-15 PROBLEM — C44.519 BASAL CELL CARCINOMA OF SKIN OF OTHER PART OF TRUNK: Status: ACTIVE | Noted: 2025-01-15

## 2025-01-15 PROCEDURE — 13101 CMPLX RPR TRUNK 2.6-7.5 CM: CPT

## 2025-01-15 PROCEDURE — 11603 EXC TR-EXT MAL+MARG 2.1-3 CM: CPT

## 2025-01-15 PROCEDURE — ? EXCISION

## 2025-01-15 PROCEDURE — ? DIAGNOSIS COMMENT

## 2025-01-15 PROCEDURE — ? PRESCRIPTION

## 2025-01-15 RX ORDER — CEPHALEXIN 500 MG/1
CAPSULE ORAL BID
Qty: 14 | Refills: 0 | Status: ERX | COMMUNITY
Start: 2025-01-15

## 2025-01-15 RX ADMIN — CEPHALEXIN: 500 CAPSULE ORAL at 00:00

## 2025-01-15 NOTE — PROCEDURE: EXCISION
Body Location Override (Optional - Billing Will Still Be Based On Selected Body Map Location If Applicable): left mid back
Surgeon (Optional): Dr. Graciela Zabala
Biopsy Photograph Reviewed: Yes
Size Of Lesion In Cm: 2.1
X Size Of Lesion In Cm (Optional): 0
Size Of Margin In Cm: 0.4
Anesthesia Volume In Cc: 8
Was An Eye Clamp Used?: No
Eye Clamp Note Details: An eye clamp was used during the procedure.
Excision Method: Elliptical
Saucerization Depth: dermis and superficial adipose tissue
Repair Type: Complex
Intermediate / Complex Repair - Final Wound Length In Cm: 5.2
Suturegard Retention Suture: 2-0 Nylon
Retention Suture Bite Size: 3 mm
Length To Time In Minutes Device Was In Place: 10
Number Of Hemigard Strips Per Side: 1
Width Of Defect Perpendicular To Closure In Cm (Required): 2.9
Undermining Type: Entire Wound
Debridement Text: The wound edges were debrided prior to proceeding with the closure to facilitate wound healing.
Helical Rim Text: The closure involved the helical rim.
Vermilion Border Text: The closure involved the vermilion border.
Nostril Rim Text: The closure involved the nostril rim.
Retention Suture Text: Retention sutures were placed to support the closure and prevent dehiscence.
Suture Removal: 14 days
Lab: [id number]
Lab Facility: [id number]
Graft Donor Site Bandage (Optional-Leave Blank If You Don't Want In Note): Steri-strips and a pressure bandage were applied to the donor site.
Epidermal Closure Graft Donor Site (Optional): simple interrupted
Billing Type: Third-Party Bill
Excision Depth: adipose tissue
Scalpel Size: 15 blade
Anesthesia Type: 1% Xylocaine with 1:200,000 epinephrine and sodium bicarbonate
Additional Anesthesia Volume In Cc: 6
Hemostasis: Electrocautery
Estimated Blood Loss (Cc): minimal
Detail Level: Detailed
Repair Depth: use same depth as excision depth
Deep Sutures: 3-0 Vicryl
Epidermal Sutures: 4-0 Nylon
Dressing: pressure dressing with telfa
Suturegard Intro: Intraoperative tissue expansion was performed, utilizing the SUTUREGARD device, in order to reduce wound tension.
Suturegard Body: The suture ends were repeatedly re-tightened and re-clamped to achieve the desired tissue expansion.
Hemigard Intro: Due to skin fragility and wound tension, it was decided to use HEMIGARD adhesive retention suture devices to permit a linear closure. The skin was cleaned and dried for a 6cm distance away from the wound. Excessive hair, if present, was removed to allow for adhesion.
Hemigard Postcare Instructions: The HEMIGARD strips are to remain completely dry for at least 5-7 days.
Positioning (Leave Blank If You Do Not Want): The patient was placed in a comfortable position exposing the surgical site.
Pre-Excision Curettage Text (Leave Blank If You Do Not Want): Prior to drawing the surgical margin the visible lesion was removed with electrodesiccation and curettage to clearly define the lesion size.
Complex Repair Preamble Text (Leave Blank If You Do Not Want): Extensive wide undermining was performed.
Intermediate Repair Preamble Text (Leave Blank If You Do Not Want): Undermining was performed with blunt dissection.
Curvilinear Excision Additional Text (Leave Blank If You Do Not Want): The margin was drawn around the clinically apparent lesion.  A curvilinear shape was then drawn on the skin incorporating the lesion and margins.  Incisions were then made along these lines to the appropriate tissue plane and the lesion was extirpated.
Fusiform Excision Additional Text (Leave Blank If You Do Not Want): The margin was drawn around the clinically apparent lesion.  A fusiform shape was then drawn on the skin incorporating the lesion and margins.  Incisions were then made along these lines to the appropriate tissue plane and the lesion was extirpated.
Elliptical Excision Additional Text (Leave Blank If You Do Not Want): The margin was drawn around the clinically apparent lesion.  An elliptical shape was then drawn on the skin incorporating the lesion and margins.  Incisions were then made along these lines to the appropriate tissue plane and the lesion was extirpated.
Saucerization Excision Additional Text (Leave Blank If You Do Not Want): The margin was drawn around the clinically apparent lesion.  Incisions were then made along these lines, in a tangential fashion, to the appropriate tissue plane and the lesion was extirpated.
Slit Excision Additional Text (Leave Blank If You Do Not Want): A linear line was drawn on the skin overlying the lesion. An incision was made slowly until the lesion was visualized.  Once visualized, the lesion was removed with blunt dissection.
Excisional Biopsy Additional Text (Leave Blank If You Do Not Want): The margin was drawn around the clinically apparent lesion. An elliptical shape was then drawn on the skin incorporating the lesion and margins.  Incisions were then made along these lines to the appropriate tissue plane and the lesion was extirpated.
Perilesional Excision Additional Text (Leave Blank If You Do Not Want): The margin was drawn around the clinically apparent lesion. Incisions were then made along these lines to the appropriate tissue plane and the lesion was extirpated.
Repair Performed By Another Provider Text (Leave Blank If You Do Not Want): After the tissue was excised the defect was repaired by another provider.
No Repair - Repaired With Adjacent Surgical Defect Text (Leave Blank If You Do Not Want): After the excision the defect was repaired concurrently with another surgical defect which was in close approximation.
Adjacent Tissue Transfer Text: The defect edges were debeveled with a #15 scalpel blade.  Given the location of the defect and the proximity to free margins an adjacent tissue transfer was deemed most appropriate.  Using a sterile surgical marker, an appropriate flap was drawn incorporating the defect and placing the expected incisions within the relaxed skin tension lines where possible.    The area thus outlined was incised deep to adipose tissue with a #15 scalpel blade.  The skin margins were undermined to an appropriate distance in all directions utilizing iris scissors.
Advancement Flap (Single) Text: The defect edges were debeveled with a #15 scalpel blade.  Given the location of the defect and the proximity to free margins a single advancement flap was deemed most appropriate.  Using a sterile surgical marker, an appropriate advancement flap was drawn incorporating the defect and placing the expected incisions within the relaxed skin tension lines where possible.    The area thus outlined was incised deep to adipose tissue with a #15 scalpel blade.  The skin margins were undermined to an appropriate distance in all directions utilizing iris scissors.
Advancement Flap (Double) Text: The defect edges were debeveled with a #15 scalpel blade.  Given the location of the defect and the proximity to free margins a double advancement flap was deemed most appropriate.  Using a sterile surgical marker, the appropriate advancement flaps were drawn incorporating the defect and placing the expected incisions within the relaxed skin tension lines where possible.    The area thus outlined was incised deep to adipose tissue with a #15 scalpel blade.  The skin margins were undermined to an appropriate distance in all directions utilizing iris scissors.
Burow's Advancement Flap Text: The defect edges were debeveled with a #15 scalpel blade.  Given the location of the defect and the proximity to free margins a Burow's advancement flap was deemed most appropriate.  Using a sterile surgical marker, the appropriate advancement flap was drawn incorporating the defect and placing the expected incisions within the relaxed skin tension lines where possible.    The area thus outlined was incised deep to adipose tissue with a #15 scalpel blade.  The skin margins were undermined to an appropriate distance in all directions utilizing iris scissors.
Chonodrocutaneous Helical Advancement Flap Text: The defect edges were debeveled with a #15 scalpel blade.  Given the location of the defect and the proximity to free margins a chondrocutaneous helical advancement flap was deemed most appropriate.  Using a sterile surgical marker, the appropriate advancement flap was drawn incorporating the defect and placing the expected incisions within the relaxed skin tension lines where possible.    The area thus outlined was incised deep to adipose tissue with a #15 scalpel blade.  The skin margins were undermined to an appropriate distance in all directions utilizing iris scissors.
Crescentic Advancement Flap Text: The defect edges were debeveled with a #15 scalpel blade.  Given the location of the defect and the proximity to free margins a crescentic advancement flap was deemed most appropriate.  Using a sterile surgical marker, the appropriate advancement flap was drawn incorporating the defect and placing the expected incisions within the relaxed skin tension lines where possible.    The area thus outlined was incised deep to adipose tissue with a #15 scalpel blade.  The skin margins were undermined to an appropriate distance in all directions utilizing iris scissors.
A-T Advancement Flap Text: The defect edges were debeveled with a #15 scalpel blade.  Given the location of the defect, shape of the defect and the proximity to free margins an A-T advancement flap was deemed most appropriate.  Using a sterile surgical marker, an appropriate advancement flap was drawn incorporating the defect and placing the expected incisions within the relaxed skin tension lines where possible.    The area thus outlined was incised deep to adipose tissue with a #15 scalpel blade.  The skin margins were undermined to an appropriate distance in all directions utilizing iris scissors.
O-T Advancement Flap Text: The defect edges were debeveled with a #15 scalpel blade.  Given the location of the defect, shape of the defect and the proximity to free margins an O-T advancement flap was deemed most appropriate.  Using a sterile surgical marker, an appropriate advancement flap was drawn incorporating the defect and placing the expected incisions within the relaxed skin tension lines where possible.    The area thus outlined was incised deep to adipose tissue with a #15 scalpel blade.  The skin margins were undermined to an appropriate distance in all directions utilizing iris scissors.
O-L Flap Text: The defect edges were debeveled with a #15 scalpel blade.  Given the location of the defect, shape of the defect and the proximity to free margins an O-L flap was deemed most appropriate.  Using a sterile surgical marker, an appropriate advancement flap was drawn incorporating the defect and placing the expected incisions within the relaxed skin tension lines where possible.    The area thus outlined was incised deep to adipose tissue with a #15 scalpel blade.  The skin margins were undermined to an appropriate distance in all directions utilizing iris scissors.
O-Z Flap Text: The defect edges were debeveled with a #15 scalpel blade.  Given the location of the defect, shape of the defect and the proximity to free margins an O-Z flap was deemed most appropriate.  Using a sterile surgical marker, an appropriate transposition flap was drawn incorporating the defect and placing the expected incisions within the relaxed skin tension lines where possible. The area thus outlined was incised deep to adipose tissue with a #15 scalpel blade.  The skin margins were undermined to an appropriate distance in all directions utilizing iris scissors.
Double O-Z Flap Text: The defect edges were debeveled with a #15 scalpel blade.  Given the location of the defect, shape of the defect and the proximity to free margins a Double O-Z flap was deemed most appropriate.  Using a sterile surgical marker, an appropriate transposition flap was drawn incorporating the defect and placing the expected incisions within the relaxed skin tension lines where possible. The area thus outlined was incised deep to adipose tissue with a #15 scalpel blade.  The skin margins were undermined to an appropriate distance in all directions utilizing iris scissors.
V-Y Flap Text: The defect edges were debeveled with a #15 scalpel blade.  Given the location of the defect, shape of the defect and the proximity to free margins a V-Y flap was deemed most appropriate.  Using a sterile surgical marker, an appropriate advancement flap was drawn incorporating the defect and placing the expected incisions within the relaxed skin tension lines where possible.    The area thus outlined was incised deep to adipose tissue with a #15 scalpel blade.  The skin margins were undermined to an appropriate distance in all directions utilizing iris scissors.
Advancement-Rotation Flap Text: The defect edges were debeveled with a #15 scalpel blade.  Given the location of the defect, shape of the defect and the proximity to free margins an advancement-rotation flap was deemed most appropriate.  Using a sterile surgical marker, an appropriate flap was drawn incorporating the defect and placing the expected incisions within the relaxed skin tension lines where possible. The area thus outlined was incised deep to adipose tissue with a #15 scalpel blade.  The skin margins were undermined to an appropriate distance in all directions utilizing iris scissors.
Mercedes Flap Text: The defect edges were debeveled with a #15 scalpel blade.  Given the location of the defect, shape of the defect and the proximity to free margins a Mercedes flap was deemed most appropriate.  Using a sterile surgical marker, an appropriate advancement flap was drawn incorporating the defect and placing the expected incisions within the relaxed skin tension lines where possible. The area thus outlined was incised deep to adipose tissue with a #15 scalpel blade.  The skin margins were undermined to an appropriate distance in all directions utilizing iris scissors.
Modified Advancement Flap Text: The defect edges were debeveled with a #15 scalpel blade.  Given the location of the defect, shape of the defect and the proximity to free margins a modified advancement flap was deemed most appropriate.  Using a sterile surgical marker, an appropriate advancement flap was drawn incorporating the defect and placing the expected incisions within the relaxed skin tension lines where possible.    The area thus outlined was incised deep to adipose tissue with a #15 scalpel blade.  The skin margins were undermined to an appropriate distance in all directions utilizing iris scissors.
Mucosal Advancement Flap Text: Given the location of the defect, shape of the defect and the proximity to free margins a mucosal advancement flap was deemed most appropriate. Incisions were made with a 15 blade scalpel in the appropriate fashion along the cutaneous vermillion border and the mucosal lip. The remaining actinically damaged mucosal tissue was excised.  The mucosal advancement flap was then elevated to the gingival sulcus with care taken to preserve the neurovascular structures and advanced into the primary defect. Care was taken to ensure that precise realignment of the vermillion border was achieved.
Peng Advancement Flap Text: The defect edges were debeveled with a #15 scalpel blade.  Given the location of the defect, shape of the defect and the proximity to free margins a Peng advancement flap was deemed most appropriate.  Using a sterile surgical marker, an appropriate advancement flap was drawn incorporating the defect and placing the expected incisions within the relaxed skin tension lines where possible. The area thus outlined was incised deep to adipose tissue with a #15 scalpel blade.  The skin margins were undermined to an appropriate distance in all directions utilizing iris scissors.
Hatchet Flap Text: The defect edges were debeveled with a #15 scalpel blade.  Given the location of the defect, shape of the defect and the proximity to free margins a hatchet flap was deemed most appropriate.  Using a sterile surgical marker, an appropriate hatchet flap was drawn incorporating the defect and placing the expected incisions within the relaxed skin tension lines where possible.    The area thus outlined was incised deep to adipose tissue with a #15 scalpel blade.  The skin margins were undermined to an appropriate distance in all directions utilizing iris scissors.
Rotation Flap Text: The defect edges were debeveled with a #15 scalpel blade.  Given the location of the defect, shape of the defect and the proximity to free margins a rotation flap was deemed most appropriate.  Using a sterile surgical marker, an appropriate rotation flap was drawn incorporating the defect and placing the expected incisions within the relaxed skin tension lines where possible.    The area thus outlined was incised deep to adipose tissue with a #15 scalpel blade.  The skin margins were undermined to an appropriate distance in all directions utilizing iris scissors.
Bilateral Rotation Flap Text: The defect edges were debeveled with a #15 scalpel blade. Given the location of the defect, shape of the defect and the proximity to free margins a bilateral rotation flap was deemed most appropriate. Using a sterile surgical marker, an appropriate rotation flap was drawn incorporating the defect and placing the expected incisions within the relaxed skin tension lines where possible. The area thus outlined was incised deep to adipose tissue with a #15 scalpel blade. The skin margins were undermined to an appropriate distance in all directions utilizing iris scissors. Following this, the designed flap was carried over into the primary defect and sutured into place.
Spiral Flap Text: The defect edges were debeveled with a #15 scalpel blade.  Given the location of the defect, shape of the defect and the proximity to free margins a spiral flap was deemed most appropriate.  Using a sterile surgical marker, an appropriate rotation flap was drawn incorporating the defect and placing the expected incisions within the relaxed skin tension lines where possible. The area thus outlined was incised deep to adipose tissue with a #15 scalpel blade.  The skin margins were undermined to an appropriate distance in all directions utilizing iris scissors.
Staged Advancement Flap Text: The defect edges were debeveled with a #15 scalpel blade.  Given the location of the defect, shape of the defect and the proximity to free margins a staged advancement flap was deemed most appropriate.  Using a sterile surgical marker, an appropriate advancement flap was drawn incorporating the defect and placing the expected incisions within the relaxed skin tension lines where possible. The area thus outlined was incised deep to adipose tissue with a #15 scalpel blade.  The skin margins were undermined to an appropriate distance in all directions utilizing iris scissors.
Star Wedge Flap Text: The defect edges were debeveled with a #15 scalpel blade.  Given the location of the defect, shape of the defect and the proximity to free margins a star wedge flap was deemed most appropriate.  Using a sterile surgical marker, an appropriate rotation flap was drawn incorporating the defect and placing the expected incisions within the relaxed skin tension lines where possible. The area thus outlined was incised deep to adipose tissue with a #15 scalpel blade.  The skin margins were undermined to an appropriate distance in all directions utilizing iris scissors.
Transposition Flap Text: The defect edges were debeveled with a #15 scalpel blade.  Given the location of the defect and the proximity to free margins a transposition flap was deemed most appropriate.  Using a sterile surgical marker, an appropriate transposition flap was drawn incorporating the defect.    The area thus outlined was incised deep to adipose tissue with a #15 scalpel blade.  The skin margins were undermined to an appropriate distance in all directions utilizing iris scissors.
Muscle Hinge Flap Text: The defect edges were debeveled with a #15 scalpel blade.  Given the size, depth and location of the defect and the proximity to free margins a muscle hinge flap was deemed most appropriate.  Using a sterile surgical marker, an appropriate hinge flap was drawn incorporating the defect. The area thus outlined was incised with a #15 scalpel blade.  The skin margins were undermined to an appropriate distance in all directions utilizing iris scissors.
Mustarde Flap Text: The defect edges were debeveled with a #15 scalpel blade.  Given the size, depth and location of the defect and the proximity to free margins a Mustarde flap was deemed most appropriate.  Using a sterile surgical marker, an appropriate flap was drawn incorporating the defect. The area thus outlined was incised with a #15 scalpel blade.  The skin margins were undermined to an appropriate distance in all directions utilizing iris scissors.
Nasal Turnover Hinge Flap Text: The defect edges were debeveled with a #15 scalpel blade.  Given the size, depth, location of the defect and the defect being full thickness a nasal turnover hinge flap was deemed most appropriate.  Using a sterile surgical marker, an appropriate hinge flap was drawn incorporating the defect. The area thus outlined was incised with a #15 scalpel blade. The flap was designed to recreate the nasal mucosal lining and the alar rim. The skin margins were undermined to an appropriate distance in all directions utilizing iris scissors.
Nasalis-Muscle-Based Myocutaneous Island Pedicle Flap Text: Using a #15 blade, an incision was made around the donor flap to the level of the nasalis muscle. Wide lateral undermining was then performed in both the subcutaneous plane above the nasalis muscle, and in a submuscular plane just above periosteum. This allowed the formation of a free nasalis muscle axial pedicle (based on the angular artery) which was still attached to the actual cutaneous flap, increasing its mobility and vascular viability. Hemostasis was obtained with pinpoint electrocoagulation. The flap was mobilized into position and the pivotal anchor points positioned and stabilized with buried interrupted sutures. Subcutaneous and dermal tissues were closed in a multilayered fashion with sutures. Tissue redundancies were excised, and the epidermal edges were apposed without significant tension and sutured with sutures.
Nasalis Myocutaneous Flap Text: Using a #15 blade, an incision was made around the donor flap to the level of the nasalis muscle. Wide lateral undermining was then performed in both the subcutaneous plane above the nasalis muscle, and in a submuscular plane just above periosteum. This allowed the formation of a free nasalis muscle axial pedicle which was still attached to the actual cutaneous flap, increasing its mobility and vascular viability. Hemostasis was obtained with pinpoint electrocoagulation. The flap was mobilized into position and the pivotal anchor points positioned and stabilized with buried interrupted sutures. Subcutaneous and dermal tissues were closed in a multilayered fashion with sutures. Tissue redundancies were excised, and the epidermal edges were apposed without significant tension and sutured with sutures.
Nasolabial Transposition Flap Text: The defect edges were debeveled with a #15 scalpel blade.  Given the size, depth and location of the defect and the proximity to free margins a nasolabial transposition flap was deemed most appropriate. Using a sterile surgical marker, an appropriate flap was drawn incorporating the defect. The area thus outlined was incised with a #15 scalpel blade. The skin margins were undermined to an appropriate distance in all directions utilizing iris scissors. Following this, the designed flap was carried into the primary defect and sutured into place.
Orbicularis Oris Muscle Flap Text: The defect edges were debeveled with a #15 scalpel blade.  Given that the defect affected the competency of the oral sphincter an obicularis oris muscle flap was deemed most appropriate to restore this competency and normal muscle function.  Using a sterile surgical marker, an appropriate flap was drawn incorporating the defect. The area thus outlined was incised with a #15 scalpel blade.
Melolabial Transposition Flap Text: The defect edges were debeveled with a #15 scalpel blade.  Given the location of the defect and the proximity to free margins a melolabial flap was deemed most appropriate.  Using a sterile surgical marker, an appropriate melolabial transposition flap was drawn incorporating the defect.    The area thus outlined was incised deep to adipose tissue with a #15 scalpel blade.  The skin margins were undermined to an appropriate distance in all directions utilizing iris scissors.
Rectangular Flap Text: The defect edges were debeveled with a #15 scalpel blade. Given the location of the defect and the proximity to free margins a rectangular flap was deemed most appropriate. Using a sterile surgical marker, an appropriate rectangular flap was drawn incorporating the defect. The area thus outlined was incised deep to adipose tissue with a #15 scalpel blade. The skin margins were undermined to an appropriate distance in all directions utilizing iris scissors. Following this, the designed flap was carried over into the primary defect and sutured into place.
Rhombic Flap Text: The defect edges were debeveled with a #15 scalpel blade.  Given the location of the defect and the proximity to free margins a rhombic flap was deemed most appropriate.  Using a sterile surgical marker, an appropriate rhombic flap was drawn incorporating the defect.    The area thus outlined was incised deep to adipose tissue with a #15 scalpel blade.  The skin margins were undermined to an appropriate distance in all directions utilizing iris scissors.
Rhomboid Transposition Flap Text: The defect edges were debeveled with a #15 scalpel blade.  Given the location of the defect and the proximity to free margins a rhomboid transposition flap was deemed most appropriate.  Using a sterile surgical marker, an appropriate rhomboid flap was drawn incorporating the defect.    The area thus outlined was incised deep to adipose tissue with a #15 scalpel blade.  The skin margins were undermined to an appropriate distance in all directions utilizing iris scissors.
Bi-Rhombic Flap Text: The defect edges were debeveled with a #15 scalpel blade.  Given the location of the defect and the proximity to free margins a bi-rhombic flap was deemed most appropriate.  Using a sterile surgical marker, an appropriate rhombic flap was drawn incorporating the defect. The area thus outlined was incised deep to adipose tissue with a #15 scalpel blade.  The skin margins were undermined to an appropriate distance in all directions utilizing iris scissors.
Helical Rim Advancement Flap Text: The defect edges were debeveled with a #15 blade scalpel.  Given the location of the defect and the proximity to free margins (helical rim) a double helical rim advancement flap was deemed most appropriate.  Using a sterile surgical marker, the appropriate advancement flaps were drawn incorporating the defect and placing the expected incisions between the helical rim and antihelix where possible.  The area thus outlined was incised through and through with a #15 scalpel blade.  With a skin hook and iris scissors, the flaps were gently and sharply undermined and freed up.
Bilateral Helical Rim Advancement Flap Text: The defect edges were debeveled with a #15 blade scalpel.  Given the location of the defect and the proximity to free margins (helical rim) a bilateral helical rim advancement flap was deemed most appropriate.  Using a sterile surgical marker, the appropriate advancement flaps were drawn incorporating the defect and placing the expected incisions between the helical rim and antihelix where possible.  The area thus outlined was incised through and through with a #15 scalpel blade.  With a skin hook and iris scissors, the flaps were gently and sharply undermined and freed up.
Ear Star Wedge Flap Text: The defect edges were debeveled with a #15 blade scalpel.  Given the location of the defect and the proximity to free margins (helical rim) an ear star wedge flap was deemed most appropriate.  Using a sterile surgical marker, the appropriate flap was drawn incorporating the defect and placing the expected incisions between the helical rim and antihelix where possible.  The area thus outlined was incised through and through with a #15 scalpel blade.
Flip-Flop Flap Text: The defect edges were debeveled with a #15 blade scalpel.  Given the location of the defect and the proximity to free margins a flip-flop flap was deemed most appropriate. Using a sterile surgical marker, the appropriate flap was drawn incorporating the defect and placing the expected incisions between the helical rim and antihelix where possible.  The area thus outlined was incised through and through with a #15 scalpel blade. Following this, the designed flap was carried over into the primary defect and sutured into place.
Banner Transposition Flap Text: The defect edges were debeveled with a #15 scalpel blade.  Given the location of the defect and the proximity to free margins a Banner transposition flap was deemed most appropriate.  Using a sterile surgical marker, an appropriate flap drawn around the defect. The area thus outlined was incised deep to adipose tissue with a #15 scalpel blade.  The skin margins were undermined to an appropriate distance in all directions utilizing iris scissors.
Bilobed Flap Text: The defect edges were debeveled with a #15 scalpel blade.  Given the location of the defect and the proximity to free margins a bilobe flap was deemed most appropriate.  Using a sterile surgical marker, an appropriate bilobe flap drawn around the defect.    The area thus outlined was incised deep to adipose tissue with a #15 scalpel blade.  The skin margins were undermined to an appropriate distance in all directions utilizing iris scissors.
Bilobed Transposition Flap Text: The defect edges were debeveled with a #15 scalpel blade.  Given the location of the defect and the proximity to free margins a bilobed transposition flap was deemed most appropriate.  Using a sterile surgical marker, an appropriate bilobe flap drawn around the defect.    The area thus outlined was incised deep to adipose tissue with a #15 scalpel blade.  The skin margins were undermined to an appropriate distance in all directions utilizing iris scissors.
Trilobed Flap Text: The defect edges were debeveled with a #15 scalpel blade.  Given the location of the defect and the proximity to free margins a trilobed flap was deemed most appropriate.  Using a sterile surgical marker, an appropriate trilobed flap drawn around the defect.    The area thus outlined was incised deep to adipose tissue with a #15 scalpel blade.  The skin margins were undermined to an appropriate distance in all directions utilizing iris scissors.
Dorsal Nasal Flap Text: The defect edges were debeveled with a #15 scalpel blade.  Given the location of the defect and the proximity to free margins a dorsal nasal flap was deemed most appropriate.  Using a sterile surgical marker, an appropriate dorsal nasal flap was drawn around the defect.    The area thus outlined was incised deep to adipose tissue with a #15 scalpel blade.  The skin margins were undermined to an appropriate distance in all directions utilizing iris scissors.
Island Pedicle Flap Text: The defect edges were debeveled with a #15 scalpel blade.  Given the location of the defect, shape of the defect and the proximity to free margins an island pedicle advancement flap was deemed most appropriate.  Using a sterile surgical marker, an appropriate advancement flap was drawn incorporating the defect, outlining the appropriate donor tissue and placing the expected incisions within the relaxed skin tension lines where possible.    The area thus outlined was incised deep to adipose tissue with a #15 scalpel blade.  The skin margins were undermined to an appropriate distance in all directions around the primary defect and laterally outward around the island pedicle utilizing iris scissors.  There was minimal undermining beneath the pedicle flap.
Island Pedicle Flap With Canthal Suspension Text: The defect edges were debeveled with a #15 scalpel blade.  Given the location of the defect, shape of the defect and the proximity to free margins an island pedicle advancement flap was deemed most appropriate.  Using a sterile surgical marker, an appropriate advancement flap was drawn incorporating the defect, outlining the appropriate donor tissue and placing the expected incisions within the relaxed skin tension lines where possible. The area thus outlined was incised deep to adipose tissue with a #15 scalpel blade.  The skin margins were undermined to an appropriate distance in all directions around the primary defect and laterally outward around the island pedicle utilizing iris scissors.  There was minimal undermining beneath the pedicle flap. A suspension suture was placed in the canthal tendon to prevent tension and prevent ectropion.
Alar Island Pedicle Flap Text: The defect edges were debeveled with a #15 scalpel blade.  Given the location of the defect, shape of the defect and the proximity to the alar rim an island pedicle advancement flap was deemed most appropriate.  Using a sterile surgical marker, an appropriate advancement flap was drawn incorporating the defect, outlining the appropriate donor tissue and placing the expected incisions within the nasal ala running parallel to the alar rim. The area thus outlined was incised with a #15 scalpel blade.  The skin margins were undermined minimally to an appropriate distance in all directions around the primary defect and laterally outward around the island pedicle utilizing iris scissors.  There was minimal undermining beneath the pedicle flap.
Double Island Pedicle Flap Text: The defect edges were debeveled with a #15 scalpel blade.  Given the location of the defect, shape of the defect and the proximity to free margins a double island pedicle advancement flap was deemed most appropriate.  Using a sterile surgical marker, an appropriate advancement flap was drawn incorporating the defect, outlining the appropriate donor tissue and placing the expected incisions within the relaxed skin tension lines where possible.    The area thus outlined was incised deep to adipose tissue with a #15 scalpel blade.  The skin margins were undermined to an appropriate distance in all directions around the primary defect and laterally outward around the island pedicle utilizing iris scissors.  There was minimal undermining beneath the pedicle flap.
Island Pedicle Flap-Requiring Vessel Identification Text: The defect edges were debeveled with a #15 scalpel blade.  Given the location of the defect, shape of the defect and the proximity to free margins an island pedicle advancement flap was deemed most appropriate.  Using a sterile surgical marker, an appropriate advancement flap was drawn, based on the axial vessel mentioned above, incorporating the defect, outlining the appropriate donor tissue and placing the expected incisions within the relaxed skin tension lines where possible.    The area thus outlined was incised deep to adipose tissue with a #15 scalpel blade.  The skin margins were undermined to an appropriate distance in all directions around the primary defect and laterally outward around the island pedicle utilizing iris scissors.  There was minimal undermining beneath the pedicle flap.
Keystone Flap Text: The defect edges were debeveled with a #15 scalpel blade.  Given the location of the defect, shape of the defect a keystone flap was deemed most appropriate.  Using a sterile surgical marker, an appropriate keystone flap was drawn incorporating the defect, outlining the appropriate donor tissue and placing the expected incisions within the relaxed skin tension lines where possible. The area thus outlined was incised deep to adipose tissue with a #15 scalpel blade.  The skin margins were undermined to an appropriate distance in all directions around the primary defect and laterally outward around the flap utilizing iris scissors.
O-T Plasty Text: The defect edges were debeveled with a #15 scalpel blade.  Given the location of the defect, shape of the defect and the proximity to free margins an O-T plasty was deemed most appropriate.  Using a sterile surgical marker, an appropriate O-T plasty was drawn incorporating the defect and placing the expected incisions within the relaxed skin tension lines where possible.    The area thus outlined was incised deep to adipose tissue with a #15 scalpel blade.  The skin margins were undermined to an appropriate distance in all directions utilizing iris scissors.
O-Z Plasty Text: The defect edges were debeveled with a #15 scalpel blade.  Given the location of the defect, shape of the defect and the proximity to free margins an O-Z plasty (double transposition flap) was deemed most appropriate.  Using a sterile surgical marker, the appropriate transposition flaps were drawn incorporating the defect and placing the expected incisions within the relaxed skin tension lines where possible.    The area thus outlined was incised deep to adipose tissue with a #15 scalpel blade.  The skin margins were undermined to an appropriate distance in all directions utilizing iris scissors.  Hemostasis was achieved with electrocautery.  The flaps were then transposed into place, one clockwise and the other counterclockwise, and anchored with interrupted buried subcutaneous sutures.
Double O-Z Plasty Text: The defect edges were debeveled with a #15 scalpel blade.  Given the location of the defect, shape of the defect and the proximity to free margins a Double O-Z plasty (double transposition flap) was deemed most appropriate.  Using a sterile surgical marker, the appropriate transposition flaps were drawn incorporating the defect and placing the expected incisions within the relaxed skin tension lines where possible. The area thus outlined was incised deep to adipose tissue with a #15 scalpel blade.  The skin margins were undermined to an appropriate distance in all directions utilizing iris scissors.  Hemostasis was achieved with electrocautery.  The flaps were then transposed into place, one clockwise and the other counterclockwise, and anchored with interrupted buried subcutaneous sutures.
V-Y Plasty Text: The defect edges were debeveled with a #15 scalpel blade.  Given the location of the defect, shape of the defect and the proximity to free margins an V-Y advancement flap was deemed most appropriate.  Using a sterile surgical marker, an appropriate advancement flap was drawn incorporating the defect and placing the expected incisions within the relaxed skin tension lines where possible.    The area thus outlined was incised deep to adipose tissue with a #15 scalpel blade.  The skin margins were undermined to an appropriate distance in all directions utilizing iris scissors.
H Plasty Text: Given the location of the defect, shape of the defect and the proximity to free margins a H-plasty was deemed most appropriate for repair.  Using a sterile surgical marker, the appropriate advancement arms of the H-plasty were drawn incorporating the defect and placing the expected incisions within the relaxed skin tension lines where possible. The area thus outlined was incised deep to adipose tissue with a #15 scalpel blade. The skin margins were undermined to an appropriate distance in all directions utilizing iris scissors.  The opposing advancement arms were then advanced into place in opposite direction and anchored with interrupted buried subcutaneous sutures.
W Plasty Text: The lesion was extirpated to the level of the fat with a #15 scalpel blade.  Given the location of the defect, shape of the defect and the proximity to free margins a W-plasty was deemed most appropriate for repair.  Using a sterile surgical marker, the appropriate transposition arms of the W-plasty were drawn incorporating the defect and placing the expected incisions within the relaxed skin tension lines where possible.    The area thus outlined was incised deep to adipose tissue with a #15 scalpel blade.  The skin margins were undermined to an appropriate distance in all directions utilizing iris scissors.  The opposing transposition arms were then transposed into place in opposite direction and anchored with interrupted buried subcutaneous sutures.
Z Plasty Text: The lesion was extirpated to the level of the fat with a #15 scalpel blade.  Given the location of the defect, shape of the defect and the proximity to free margins a Z-plasty was deemed most appropriate for repair.  Using a sterile surgical marker, the appropriate transposition arms of the Z-plasty were drawn incorporating the defect and placing the expected incisions within the relaxed skin tension lines where possible.    The area thus outlined was incised deep to adipose tissue with a #15 scalpel blade.  The skin margins were undermined to an appropriate distance in all directions utilizing iris scissors.  The opposing transposition arms were then transposed into place in opposite direction and anchored with interrupted buried subcutaneous sutures.
Double Z Plasty Text: The lesion was extirpated to the level of the fat with a #15 scalpel blade. Given the location of the defect, shape of the defect and the proximity to free margins a double Z-plasty was deemed most appropriate for repair. Using a sterile surgical marker, the appropriate transposition arms of the double Z-plasty were drawn incorporating the defect and placing the expected incisions within the relaxed skin tension lines where possible. The area thus outlined was incised deep to adipose tissue with a #15 scalpel blade. The skin margins were undermined to an appropriate distance in all directions utilizing iris scissors. The opposing transposition arms were then transposed and carried over into place in opposite direction and anchored with interrupted buried subcutaneous sutures.
Zygomaticofacial Flap Text: Given the location of the defect, shape of the defect and the proximity to free margins a zygomaticofacial flap was deemed most appropriate for repair.  Using a sterile surgical marker, the appropriate flap was drawn incorporating the defect and placing the expected incisions within the relaxed skin tension lines where possible. The area thus outlined was incised deep to adipose tissue with a #15 scalpel blade with preservation of a vascular pedicle.  The skin margins were undermined to an appropriate distance in all directions utilizing iris scissors.  The flap was then placed into the defect and anchored with interrupted buried subcutaneous sutures.
Cheek Interpolation Flap Text: A decision was made to reconstruct the defect utilizing an interpolation axial flap and a staged reconstruction.  A telfa template was made of the defect.  This telfa template was then used to outline the Cheek Interpolation flap.  The donor area for the pedicle flap was then injected with anesthesia.  The flap was excised through the skin and subcutaneous tissue down to the layer of the underlying musculature.  The interpolation flap was carefully excised within this deep plane to maintain its blood supply.  The edges of the donor site were undermined.   The donor site was closed in a primary fashion.  The pedicle was then rotated into position and sutured.  Once the tube was sutured into place, adequate blood supply was confirmed with blanching and refill.  The pedicle was then wrapped with xeroform gauze and dressed appropriately with a telfa and gauze bandage to ensure continued blood supply and protect the attached pedicle.
Cheek-To-Nose Interpolation Flap Text: A decision was made to reconstruct the defect utilizing an interpolation axial flap and a staged reconstruction.  A telfa template was made of the defect.  This telfa template was then used to outline the Cheek-To-Nose Interpolation flap.  The donor area for the pedicle flap was then injected with anesthesia.  The flap was excised through the skin and subcutaneous tissue down to the layer of the underlying musculature.  The interpolation flap was carefully excised within this deep plane to maintain its blood supply.  The edges of the donor site were undermined.   The donor site was closed in a primary fashion.  The pedicle was then rotated into position and sutured.  Once the tube was sutured into place, adequate blood supply was confirmed with blanching and refill.  The pedicle was then wrapped with xeroform gauze and dressed appropriately with a telfa and gauze bandage to ensure continued blood supply and protect the attached pedicle.
Interpolation Flap Text: A decision was made to reconstruct the defect utilizing an interpolation axial flap and a staged reconstruction.  A telfa template was made of the defect.  This telfa template was then used to outline the interpolation flap.  The donor area for the pedicle flap was then injected with anesthesia.  The flap was excised through the skin and subcutaneous tissue down to the layer of the underlying musculature.  The interpolation flap was carefully excised within this deep plane to maintain its blood supply.  The edges of the donor site were undermined.   The donor site was closed in a primary fashion.  The pedicle was then rotated into position and sutured.  Once the tube was sutured into place, adequate blood supply was confirmed with blanching and refill.  The pedicle was then wrapped with xeroform gauze and dressed appropriately with a telfa and gauze bandage to ensure continued blood supply and protect the attached pedicle.
Melolabial Interpolation Flap Text: A decision was made to reconstruct the defect utilizing an interpolation axial flap and a staged reconstruction.  A telfa template was made of the defect.  This telfa template was then used to outline the melolabial interpolation flap.  The donor area for the pedicle flap was then injected with anesthesia.  The flap was excised through the skin and subcutaneous tissue down to the layer of the underlying musculature.  The pedicle flap was carefully excised within this deep plane to maintain its blood supply.  The edges of the donor site were undermined.   The donor site was closed in a primary fashion.  The pedicle was then rotated into position and sutured.  Once the tube was sutured into place, adequate blood supply was confirmed with blanching and refill.  The pedicle was then wrapped with xeroform gauze and dressed appropriately with a telfa and gauze bandage to ensure continued blood supply and protect the attached pedicle.
Mastoid Interpolation Flap Text: A decision was made to reconstruct the defect utilizing an interpolation axial flap and a staged reconstruction.  A telfa template was made of the defect.  This telfa template was then used to outline the mastoid interpolation flap.  The donor area for the pedicle flap was then injected with anesthesia.  The flap was excised through the skin and subcutaneous tissue down to the layer of the underlying musculature.  The pedicle flap was carefully excised within this deep plane to maintain its blood supply.  The edges of the donor site were undermined.   The donor site was closed in a primary fashion.  The pedicle was then rotated into position and sutured.  Once the tube was sutured into place, adequate blood supply was confirmed with blanching and refill.  The pedicle was then wrapped with xeroform gauze and dressed appropriately with a telfa and gauze bandage to ensure continued blood supply and protect the attached pedicle.
Posterior Auricular Interpolation Flap Text: A decision was made to reconstruct the defect utilizing an interpolation axial flap and a staged reconstruction.  A telfa template was made of the defect.  This telfa template was then used to outline the posterior auricular interpolation flap.  The donor area for the pedicle flap was then injected with anesthesia.  The flap was excised through the skin and subcutaneous tissue down to the layer of the underlying musculature.  The pedicle flap was carefully excised within this deep plane to maintain its blood supply.  The edges of the donor site were undermined.   The donor site was closed in a primary fashion.  The pedicle was then rotated into position and sutured.  Once the tube was sutured into place, adequate blood supply was confirmed with blanching and refill.  The pedicle was then wrapped with xeroform gauze and dressed appropriately with a telfa and gauze bandage to ensure continued blood supply and protect the attached pedicle.
Paramedian Forehead Flap Text: A decision was made to reconstruct the defect utilizing an interpolation axial flap and a staged reconstruction.  A telfa template was made of the defect.  This telfa template was then used to outline the paramedian forehead pedicle flap.  The donor area for the pedicle flap was then injected with anesthesia.  The flap was excised through the skin and subcutaneous tissue down to the layer of the underlying musculature.  The pedicle flap was carefully excised within this deep plane to maintain its blood supply.  The edges of the donor site were undermined.   The donor site was closed in a primary fashion.  The pedicle was then rotated into position and sutured.  Once the tube was sutured into place, adequate blood supply was confirmed with blanching and refill.  The pedicle was then wrapped with xeroform gauze and dressed appropriately with a telfa and gauze bandage to ensure continued blood supply and protect the attached pedicle.
Abbe Flap (Upper To Lower Lip) Text: The defect of the lower lip was assessed and measured.  Given the location and size of the defect, an Abbe flap was deemed most appropriate.  Using a sterile surgical marker, an appropriate Abbe flap was measured and drawn on the upper lip. Local anesthesia was then infiltrated.  A scalpel was then used to incise the upper lip through and through the skin, vermilion, muscle and mucosa, leaving the flap pedicled on the opposite side.  The flap was then rotated and transferred to the lower lip defect.  The flap was then sutured into place with a three layer technique, closing the orbicularis oris muscle layer with subcutaneous buried sutures, followed by a mucosal layer and an epidermal layer.
Abbe Flap (Lower To Upper Lip) Text: The defect of the upper lip was assessed and measured.  Given the location and size of the defect, an Abbe flap was deemed most appropriate.  Using a sterile surgical marker, an appropriate Abbe flap was measured and drawn on the lower lip. Local anesthesia was then infiltrated. A scalpel was then used to incise the upper lip through and through the skin, vermilion, muscle and mucosa, leaving the flap pedicled on the opposite side.  The flap was then rotated and transferred to the lower lip defect.  The flap was then sutured into place with a three layer technique, closing the orbicularis oris muscle layer with subcutaneous buried sutures, followed by a mucosal layer and an epidermal layer.
Estlander Flap (Upper To Lower Lip) Text: The defect of the lower lip was assessed and measured.  Given the location and size of the defect, an Estlander flap was deemed most appropriate.  Using a sterile surgical marker, an appropriate Estlander flap was measured and drawn on the upper lip. Local anesthesia was then infiltrated. A scalpel was then used to incise the lateral aspect of the flap, through skin, muscle and mucosa, leaving the flap pedicled medially.  The flap was then rotated and positioned to fill the lower lip defect.  The flap was then sutured into place with a three layer technique, closing the orbicularis oris muscle layer with subcutaneous buried sutures, followed by a mucosal layer and an epidermal layer.
Lip Wedge Excision Repair Text: Given the location of the defect and the proximity to free margins a full thickness wedge repair was deemed most appropriate.  Using a sterile surgical marker, the appropriate repair was drawn incorporating the defect and placing the expected incisions perpendicular to the vermillion border.  The vermillion border was also meticulously outlined to ensure appropriate reapproximation during the repair.  The area thus outlined was incised through and through with a #15 scalpel blade.  The muscularis and dermis were reaproximated with deep sutures following hemostasis. Care was taken to realign the vermillion border before proceeding with the superficial closure.  Once the vermillion was realigned the superfical and mucosal closure was finished.
Ftsg Text: The defect edges were debeveled with a #15 scalpel blade.  Given the location of the defect, shape of the defect and the proximity to free margins a full thickness skin graft was deemed most appropriate.  Using a sterile surgical marker, the primary defect shape was transferred to the donor site. The area thus outlined was incised deep to adipose tissue with a #15 scalpel blade.  The harvested graft was then trimmed of adipose tissue until only dermis and epidermis was left.  The skin margins of the secondary defect were undermined to an appropriate distance in all directions utilizing iris scissors.  The secondary defect was closed with interrupted buried subcutaneous sutures.  The skin edges were then re-apposed with running  sutures.  The skin graft was then placed in the primary defect and oriented appropriately.
Split-Thickness Skin Graft Text: The defect edges were debeveled with a #15 scalpel blade.  Given the location of the defect, shape of the defect and the proximity to free margins a split thickness skin graft was deemed most appropriate.  Using a sterile surgical marker, the primary defect shape was transferred to the donor site. The split thickness graft was then harvested.  The skin graft was then placed in the primary defect and oriented appropriately.
Pinch Graft Text: The defect edges were debeveled with a #15 scalpel blade. Given the location of the defect, shape of the defect and the proximity to free margins a pinch graft was deemed most appropriate. Using a sterile surgical marker, the primary defect shape was transferred to the donor site. The area thus outlined was incised deep to adipose tissue with a #15 scalpel blade.  The harvested graft was then trimmed of adipose tissue until only dermis and epidermis was left. The skin margins of the secondary defect were undermined to an appropriate distance in all directions utilizing iris scissors.  The secondary defect was closed with interrupted buried subcutaneous sutures.  The skin edges were then re-apposed with running  sutures.  The skin graft was then placed in the primary defect and oriented appropriately.
Burow's Graft Text: The defect edges were debeveled with a #15 scalpel blade.  Given the location of the defect, shape of the defect, the proximity to free margins and the presence of a standing cone deformity a Burow's skin graft was deemed most appropriate. The standing cone was removed and this tissue was then trimmed to the shape of the primary defect. The adipose tissue was also removed until only dermis and epidermis were left.  The skin margins of the secondary defect were undermined to an appropriate distance in all directions utilizing iris scissors.  The secondary defect was closed with interrupted buried subcutaneous sutures.  The skin edges were then re-apposed with running  sutures.  The skin graft was then placed in the primary defect and oriented appropriately.
Cartilage Graft Text: The defect edges were debeveled with a #15 scalpel blade.  Given the location of the defect, shape of the defect, the fact the defect involved a full thickness cartilage defect a cartilage graft was deemed most appropriate.  An appropriate donor site was identified, cleansed, and anesthetized. The cartilage graft was then harvested and transferred to the recipient site, oriented appropriately and then sutured into place.  The secondary defect was then repaired using a primary closure.
Composite Graft Text: The defect edges were debeveled with a #15 scalpel blade.  Given the location of the defect, shape of the defect, the proximity to free margins and the fact the defect was full thickness a composite graft was deemed most appropriate.  The defect was outline and then transferred to the donor site.  A full thickness graft was then excised from the donor site. The graft was then placed in the primary defect, oriented appropriately and then sutured into place.  The secondary defect was then repaired using a primary closure.
Epidermal Autograft Text: The defect edges were debeveled with a #15 scalpel blade.  Given the location of the defect, shape of the defect and the proximity to free margins an epidermal autograft was deemed most appropriate.  Using a sterile surgical marker, the primary defect shape was transferred to the donor site. The epidermal graft was then harvested.  The skin graft was then placed in the primary defect and oriented appropriately.
Dermal Autograft Text: The defect edges were debeveled with a #15 scalpel blade.  Given the location of the defect, shape of the defect and the proximity to free margins a dermal autograft was deemed most appropriate.  Using a sterile surgical marker, the primary defect shape was transferred to the donor site. The area thus outlined was incised deep to adipose tissue with a #15 scalpel blade.  The harvested graft was then trimmed of adipose and epidermal tissue until only dermis was left.  The skin graft was then placed in the primary defect and oriented appropriately.
Skin Substitute Text: The defect edges were debeveled with a #15 scalpel blade.  Given the location of the defect, shape of the defect and the proximity to free margins a skin substitute graft was deemed most appropriate.  The graft material was trimmed to fit the size of the defect. The graft was then placed in the primary defect and oriented appropriately.
Tissue Cultured Epidermal Autograft Text: The defect edges were debeveled with a #15 scalpel blade.  Given the location of the defect, shape of the defect and the proximity to free margins a tissue cultured epidermal autograft was deemed most appropriate.  The graft was then trimmed to fit the size of the defect.  The graft was then placed in the primary defect and oriented appropriately.
Xenograft Text: The defect edges were debeveled with a #15 scalpel blade.  Given the location of the defect, shape of the defect and the proximity to free margins a xenograft was deemed most appropriate.  The graft was then trimmed to fit the size of the defect.  The graft was then placed in the primary defect and oriented appropriately.
Purse String (Intermediate) Text: Given the location of the defect and the characteristics of the surrounding skin a pursestring intermediate closure was deemed most appropriate.  Undermining was performed circumfirentially around the surgical defect.  A purstring suture was then placed and tightened.
Purse String (Simple) Text: Given the location of the defect and the characteristics of the surrounding skin a purse string simple closure was deemed most appropriate.  Undermining was performed circumferentially around the surgical defect.  A purse string suture was then placed and tightened.
Partial Purse String (Intermediate) Text: Given the location of the defect and the characteristics of the surrounding skin an intermediate purse string closure was deemed most appropriate.  Undermining was performed circumferentially around the surgical defect.  A purse string suture was then placed and tightened. Wound tension of the circular defect prevented complete closure of the wound.
Partial Purse String (Simple) Text: Given the location of the defect and the characteristics of the surrounding skin a simple purse string closure was deemed most appropriate.  Undermining was performed circumferentially around the surgical defect.  A purse string suture was then placed and tightened. Wound tension of the circular defect prevented complete closure of the wound.
Complex Repair And Single Advancement Flap Text: The defect edges were debeveled with a #15 scalpel blade.  The primary defect was closed partially with a complex linear closure.  Given the location of the remaining defect, shape of the defect and the proximity to free margins a single advancement flap was deemed most appropriate for complete closure of the defect.  Using a sterile surgical marker, an appropriate advancement flap was drawn incorporating the defect and placing the expected incisions within the relaxed skin tension lines where possible.    The area thus outlined was incised deep to adipose tissue with a #15 scalpel blade.  The skin margins were undermined to an appropriate distance in all directions utilizing iris scissors.
Complex Repair And Double Advancement Flap Text: The defect edges were debeveled with a #15 scalpel blade.  The primary defect was closed partially with a complex linear closure.  Given the location of the remaining defect, shape of the defect and the proximity to free margins a double advancement flap was deemed most appropriate for complete closure of the defect.  Using a sterile surgical marker, an appropriate advancement flap was drawn incorporating the defect and placing the expected incisions within the relaxed skin tension lines where possible.    The area thus outlined was incised deep to adipose tissue with a #15 scalpel blade.  The skin margins were undermined to an appropriate distance in all directions utilizing iris scissors.
Complex Repair And Modified Advancement Flap Text: The defect edges were debeveled with a #15 scalpel blade.  The primary defect was closed partially with a complex linear closure.  Given the location of the remaining defect, shape of the defect and the proximity to free margins a modified advancement flap was deemed most appropriate for complete closure of the defect.  Using a sterile surgical marker, an appropriate advancement flap was drawn incorporating the defect and placing the expected incisions within the relaxed skin tension lines where possible.    The area thus outlined was incised deep to adipose tissue with a #15 scalpel blade.  The skin margins were undermined to an appropriate distance in all directions utilizing iris scissors.
Complex Repair And A-T Advancement Flap Text: The defect edges were debeveled with a #15 scalpel blade.  The primary defect was closed partially with a complex linear closure.  Given the location of the remaining defect, shape of the defect and the proximity to free margins an A-T advancement flap was deemed most appropriate for complete closure of the defect.  Using a sterile surgical marker, an appropriate advancement flap was drawn incorporating the defect and placing the expected incisions within the relaxed skin tension lines where possible.    The area thus outlined was incised deep to adipose tissue with a #15 scalpel blade.  The skin margins were undermined to an appropriate distance in all directions utilizing iris scissors.
Complex Repair And O-T Advancement Flap Text: The defect edges were debeveled with a #15 scalpel blade.  The primary defect was closed partially with a complex linear closure.  Given the location of the remaining defect, shape of the defect and the proximity to free margins an O-T advancement flap was deemed most appropriate for complete closure of the defect.  Using a sterile surgical marker, an appropriate advancement flap was drawn incorporating the defect and placing the expected incisions within the relaxed skin tension lines where possible.    The area thus outlined was incised deep to adipose tissue with a #15 scalpel blade.  The skin margins were undermined to an appropriate distance in all directions utilizing iris scissors.
Complex Repair And O-L Flap Text: The defect edges were debeveled with a #15 scalpel blade.  The primary defect was closed partially with a complex linear closure.  Given the location of the remaining defect, shape of the defect and the proximity to free margins an O-L flap was deemed most appropriate for complete closure of the defect.  Using a sterile surgical marker, an appropriate flap was drawn incorporating the defect and placing the expected incisions within the relaxed skin tension lines where possible.    The area thus outlined was incised deep to adipose tissue with a #15 scalpel blade.  The skin margins were undermined to an appropriate distance in all directions utilizing iris scissors.
Complex Repair And Bilobe Flap Text: The defect edges were debeveled with a #15 scalpel blade.  The primary defect was closed partially with a complex linear closure.  Given the location of the remaining defect, shape of the defect and the proximity to free margins a bilobe flap was deemed most appropriate for complete closure of the defect.  Using a sterile surgical marker, an appropriate advancement flap was drawn incorporating the defect and placing the expected incisions within the relaxed skin tension lines where possible.    The area thus outlined was incised deep to adipose tissue with a #15 scalpel blade.  The skin margins were undermined to an appropriate distance in all directions utilizing iris scissors.
Complex Repair And Melolabial Flap Text: The defect edges were debeveled with a #15 scalpel blade.  The primary defect was closed partially with a complex linear closure.  Given the location of the remaining defect, shape of the defect and the proximity to free margins a melolabial flap was deemed most appropriate for complete closure of the defect.  Using a sterile surgical marker, an appropriate advancement flap was drawn incorporating the defect and placing the expected incisions within the relaxed skin tension lines where possible.    The area thus outlined was incised deep to adipose tissue with a #15 scalpel blade.  The skin margins were undermined to an appropriate distance in all directions utilizing iris scissors.
Complex Repair And Rotation Flap Text: The defect edges were debeveled with a #15 scalpel blade.  The primary defect was closed partially with a complex linear closure.  Given the location of the remaining defect, shape of the defect and the proximity to free margins a rotation flap was deemed most appropriate for complete closure of the defect.  Using a sterile surgical marker, an appropriate advancement flap was drawn incorporating the defect and placing the expected incisions within the relaxed skin tension lines where possible.    The area thus outlined was incised deep to adipose tissue with a #15 scalpel blade.  The skin margins were undermined to an appropriate distance in all directions utilizing iris scissors.
Complex Repair And Rhombic Flap Text: The defect edges were debeveled with a #15 scalpel blade.  The primary defect was closed partially with a complex linear closure.  Given the location of the remaining defect, shape of the defect and the proximity to free margins a rhombic flap was deemed most appropriate for complete closure of the defect.  Using a sterile surgical marker, an appropriate advancement flap was drawn incorporating the defect and placing the expected incisions within the relaxed skin tension lines where possible.    The area thus outlined was incised deep to adipose tissue with a #15 scalpel blade.  The skin margins were undermined to an appropriate distance in all directions utilizing iris scissors.
Complex Repair And Transposition Flap Text: The defect edges were debeveled with a #15 scalpel blade.  The primary defect was closed partially with a complex linear closure.  Given the location of the remaining defect, shape of the defect and the proximity to free margins a transposition flap was deemed most appropriate for complete closure of the defect.  Using a sterile surgical marker, an appropriate advancement flap was drawn incorporating the defect and placing the expected incisions within the relaxed skin tension lines where possible.    The area thus outlined was incised deep to adipose tissue with a #15 scalpel blade.  The skin margins were undermined to an appropriate distance in all directions utilizing iris scissors.
Complex Repair And V-Y Plasty Text: The defect edges were debeveled with a #15 scalpel blade.  The primary defect was closed partially with a complex linear closure.  Given the location of the remaining defect, shape of the defect and the proximity to free margins a V-Y plasty was deemed most appropriate for complete closure of the defect.  Using a sterile surgical marker, an appropriate advancement flap was drawn incorporating the defect and placing the expected incisions within the relaxed skin tension lines where possible.    The area thus outlined was incised deep to adipose tissue with a #15 scalpel blade.  The skin margins were undermined to an appropriate distance in all directions utilizing iris scissors.
Complex Repair And M Plasty Text: The defect edges were debeveled with a #15 scalpel blade.  The primary defect was closed partially with a complex linear closure.  Given the location of the remaining defect, shape of the defect and the proximity to free margins an M plasty was deemed most appropriate for complete closure of the defect.  Using a sterile surgical marker, an appropriate advancement flap was drawn incorporating the defect and placing the expected incisions within the relaxed skin tension lines where possible.    The area thus outlined was incised deep to adipose tissue with a #15 scalpel blade.  The skin margins were undermined to an appropriate distance in all directions utilizing iris scissors.
Complex Repair And Double M Plasty Text: The defect edges were debeveled with a #15 scalpel blade.  The primary defect was closed partially with a complex linear closure.  Given the location of the remaining defect, shape of the defect and the proximity to free margins a double M plasty was deemed most appropriate for complete closure of the defect.  Using a sterile surgical marker, an appropriate advancement flap was drawn incorporating the defect and placing the expected incisions within the relaxed skin tension lines where possible.    The area thus outlined was incised deep to adipose tissue with a #15 scalpel blade.  The skin margins were undermined to an appropriate distance in all directions utilizing iris scissors.
Complex Repair And W Plasty Text: The defect edges were debeveled with a #15 scalpel blade.  The primary defect was closed partially with a complex linear closure.  Given the location of the remaining defect, shape of the defect and the proximity to free margins a W plasty was deemed most appropriate for complete closure of the defect.  Using a sterile surgical marker, an appropriate advancement flap was drawn incorporating the defect and placing the expected incisions within the relaxed skin tension lines where possible.    The area thus outlined was incised deep to adipose tissue with a #15 scalpel blade.  The skin margins were undermined to an appropriate distance in all directions utilizing iris scissors.
Complex Repair And Z Plasty Text: The defect edges were debeveled with a #15 scalpel blade.  The primary defect was closed partially with a complex linear closure.  Given the location of the remaining defect, shape of the defect and the proximity to free margins a Z plasty was deemed most appropriate for complete closure of the defect.  Using a sterile surgical marker, an appropriate advancement flap was drawn incorporating the defect and placing the expected incisions within the relaxed skin tension lines where possible.    The area thus outlined was incised deep to adipose tissue with a #15 scalpel blade.  The skin margins were undermined to an appropriate distance in all directions utilizing iris scissors.
Complex Repair And Dorsal Nasal Flap Text: The defect edges were debeveled with a #15 scalpel blade.  The primary defect was closed partially with a complex linear closure.  Given the location of the remaining defect, shape of the defect and the proximity to free margins a dorsal nasal flap was deemed most appropriate for complete closure of the defect.  Using a sterile surgical marker, an appropriate flap was drawn incorporating the defect and placing the expected incisions within the relaxed skin tension lines where possible.    The area thus outlined was incised deep to adipose tissue with a #15 scalpel blade.  The skin margins were undermined to an appropriate distance in all directions utilizing iris scissors.
Complex Repair And Ftsg Text: The defect edges were debeveled with a #15 scalpel blade.  The primary defect was closed partially with a complex linear closure.  Given the location of the defect, shape of the defect and the proximity to free margins a full thickness skin graft was deemed most appropriate to repair the remaining defect.  The graft was trimmed to fit the size of the remaining defect.  The graft was then placed in the primary defect, oriented appropriately, and sutured into place.
Complex Repair And Burow's Graft Text: The defect edges were debeveled with a #15 scalpel blade.  The primary defect was closed partially with a complex linear closure.  Given the location of the defect, shape of the defect, the proximity to free margins and the presence of a standing cone deformity a Burow's graft was deemed most appropriate to repair the remaining defect.  The graft was trimmed to fit the size of the remaining defect.  The graft was then placed in the primary defect, oriented appropriately, and sutured into place.
Complex Repair And Split-Thickness Skin Graft Text: The defect edges were debeveled with a #15 scalpel blade.  The primary defect was closed partially with a complex linear closure.  Given the location of the defect, shape of the defect and the proximity to free margins a split thickness skin graft was deemed most appropriate to repair the remaining defect.  The graft was trimmed to fit the size of the remaining defect.  The graft was then placed in the primary defect, oriented appropriately, and sutured into place.
Complex Repair And Epidermal Autograft Text: The defect edges were debeveled with a #15 scalpel blade.  The primary defect was closed partially with a complex linear closure.  Given the location of the defect, shape of the defect and the proximity to free margins an epidermal autograft was deemed most appropriate to repair the remaining defect.  The graft was trimmed to fit the size of the remaining defect.  The graft was then placed in the primary defect, oriented appropriately, and sutured into place.
Complex Repair And Dermal Autograft Text: The defect edges were debeveled with a #15 scalpel blade.  The primary defect was closed partially with a complex linear closure.  Given the location of the defect, shape of the defect and the proximity to free margins an dermal autograft was deemed most appropriate to repair the remaining defect.  The graft was trimmed to fit the size of the remaining defect.  The graft was then placed in the primary defect, oriented appropriately, and sutured into place.
Complex Repair And Tissue Cultured Epidermal Autograft Text: The defect edges were debeveled with a #15 scalpel blade.  The primary defect was closed partially with a complex linear closure.  Given the location of the defect, shape of the defect and the proximity to free margins an tissue cultured epidermal autograft was deemed most appropriate to repair the remaining defect.  The graft was trimmed to fit the size of the remaining defect.  The graft was then placed in the primary defect, oriented appropriately, and sutured into place.
Complex Repair And Skin Substitute Graft Text: The defect edges were debeveled with a #15 scalpel blade.  The primary defect was closed partially with a complex linear closure.  Given the location of the remaining defect, shape of the defect and the proximity to free margins a skin substitute graft was deemed most appropriate to repair the remaining defect.  The graft was trimmed to fit the size of the remaining defect.  The graft was then placed in the primary defect, oriented appropriately, and sutured into place.
Include Anticoagulation In Mohs Note?: Please Select the Appropriate Response
Path Notes (To The Dermatopathologist): Please check margins. DINO SUPERIOR.
Consent: The provider's intent is to therapeutically remove the lesion in its entirety; extending to the fat layer; while at the same time obtaining a tissue sample for histopathologic examination.  Consent was obtained from the patient. The risks and benefits to therapy were discussed in detail. Specifically, the risks of infection, scarring, bleeding, prolonged wound healing, incomplete removal, allergy to anesthesia, nerve injury and recurrence were addressed. Prior to the procedure, the treatment site was clearly identified and confirmed by the patient. All components of Universal Protocol/PAUSE Rule completed.
Post-Care Instructions: I reviewed with the patient in detail post-care instructions. Patient is not to engage in any heavy lifting, exercise, or swimming for the next 14 days. Should the patient develop any fevers, chills, bleeding, severe pain patient will contact the office immediately.
Home Suture Removal Text: Patient was provided a home suture removal kit and will remove their sutures at home.  If they have any questions or difficulties they will call the office.
Where Do You Want The Question To Include Opioid Counseling Located?: Case Summary Tab
Information: Selecting Yes will display possible errors in your note based on the variables you have selected. This validation is only offered as a suggestion for you. PLEASE NOTE THAT THE VALIDATION TEXT WILL BE REMOVED WHEN YOU FINALIZE YOUR NOTE. IF YOU WANT TO FAX A PRELIMINARY NOTE YOU WILL NEED TO TOGGLE THIS TO 'NO' IF YOU DO NOT WANT IT IN YOUR FAXED NOTE.

## 2025-01-29 ENCOUNTER — APPOINTMENT (OUTPATIENT)
Dept: URBAN - METROPOLITAN AREA CLINIC 126 | Facility: CLINIC | Age: OVER 89
Setting detail: DERMATOLOGY
End: 2025-01-29

## 2025-01-29 DIAGNOSIS — Z48.817 ENCOUNTER FOR SURGICAL AFTERCARE FOLLOWING SURGERY ON THE SKIN AND SUBCUTANEOUS TISSUE: ICD-10-CM

## 2025-01-29 PROCEDURE — ? SUTURE REMOVAL (GLOBAL PERIOD)

## 2025-01-29 ASSESSMENT — LOCATION DETAILED DESCRIPTION DERM: LOCATION DETAILED: SUPERIOR LUMBAR SPINE

## 2025-01-29 ASSESSMENT — LOCATION ZONE DERM: LOCATION ZONE: TRUNK

## 2025-01-29 ASSESSMENT — LOCATION SIMPLE DESCRIPTION DERM: LOCATION SIMPLE: LOWER BACK

## 2025-01-29 NOTE — PROCEDURE: SUTURE REMOVAL (GLOBAL PERIOD)
Detail Level: Detailed
Add 62387 Cpt? (Important Note: In 2017 The Use Of 69525 Is Being Tracked By Cms To Determine Future Global Period Reimbursement For Global Periods): no

## (undated) DEVICE — ESU ELEC BLADE 6" COATED E1450-6

## (undated) DEVICE — Device

## (undated) DEVICE — DRSG TELFA ISLAND 4X10"

## (undated) DEVICE — DRAPE LAP W/ARMBOARD 29410

## (undated) DEVICE — STPL LINEAR 90 X 3.5MM TA9035S

## (undated) DEVICE — BLADE KNIFE SURG 15 371115

## (undated) DEVICE — GLOVE PROTEXIS BLUE W/NEU-THERA 7.5  2D73EB75

## (undated) DEVICE — DRSG STERI STRIP 1/2X4" R1547

## (undated) DEVICE — SU VICRYL 3-0 TIE 12X18" J904T

## (undated) DEVICE — NDL 22GA 1.5"

## (undated) DEVICE — SU VICRYL 2-0 TIE 12X18" J905T

## (undated) DEVICE — BLADE CLIPPER 4406

## (undated) DEVICE — ESU LIGASURE IMPACT OPEN SEALER/DVDR CVD LG JAW LF4418

## (undated) DEVICE — LINEN TOWEL PACK X5 5464

## (undated) DEVICE — ESU GROUND PAD UNIVERSAL W/O CORD

## (undated) DEVICE — LIGHT HANDLE X2

## (undated) DEVICE — STPL 80 X 3.8MM GIA8038S

## (undated) DEVICE — SU VICRYL 3-0 SH CR 8X18" J774

## (undated) DEVICE — SU VICRYL 3-0 SH 27" J316H

## (undated) DEVICE — GLOVE PROTEXIS W/NEU-THERA 7.5  2D73TE75

## (undated) DEVICE — PACK MAJOR SBA15MAFSI

## (undated) DEVICE — SU VICRYL 2-0 CT-1 27" J339H

## (undated) DEVICE — PREP CHLORAPREP 26ML TINTED ORANGE  260815

## (undated) DEVICE — COVER CLAMP FABRIC RADIOPAQUE 9.5X150MM 072002PBX

## (undated) DEVICE — SUCTION CANISTER MEDIVAC LINER 3000ML W/LID 65651-530

## (undated) RX ORDER — DEXAMETHASONE SODIUM PHOSPHATE 4 MG/ML
INJECTION, SOLUTION INTRA-ARTICULAR; INTRALESIONAL; INTRAMUSCULAR; INTRAVENOUS; SOFT TISSUE
Status: DISPENSED
Start: 2017-09-03

## (undated) RX ORDER — PROPOFOL 10 MG/ML
INJECTION, EMULSION INTRAVENOUS
Status: DISPENSED
Start: 2017-09-03

## (undated) RX ORDER — FENTANYL CITRATE 50 UG/ML
INJECTION, SOLUTION INTRAMUSCULAR; INTRAVENOUS
Status: DISPENSED
Start: 2017-09-03

## (undated) RX ORDER — ONDANSETRON 2 MG/ML
INJECTION INTRAMUSCULAR; INTRAVENOUS
Status: DISPENSED
Start: 2017-09-03

## (undated) RX ORDER — HYDROMORPHONE HYDROCHLORIDE 1 MG/ML
INJECTION, SOLUTION INTRAMUSCULAR; INTRAVENOUS; SUBCUTANEOUS
Status: DISPENSED
Start: 2017-09-03

## (undated) RX ORDER — ERTAPENEM 1 G/1
INJECTION, POWDER, LYOPHILIZED, FOR SOLUTION INTRAMUSCULAR; INTRAVENOUS
Status: DISPENSED
Start: 2017-09-03

## (undated) RX ORDER — GLYCOPYRROLATE 0.2 MG/ML
INJECTION, SOLUTION INTRAMUSCULAR; INTRAVENOUS
Status: DISPENSED
Start: 2017-09-03

## (undated) RX ORDER — LIDOCAINE HYDROCHLORIDE 20 MG/ML
INJECTION, SOLUTION EPIDURAL; INFILTRATION; INTRACAUDAL; PERINEURAL
Status: DISPENSED
Start: 2017-09-03